# Patient Record
Sex: MALE | Race: WHITE | NOT HISPANIC OR LATINO | Employment: OTHER | URBAN - METROPOLITAN AREA
[De-identification: names, ages, dates, MRNs, and addresses within clinical notes are randomized per-mention and may not be internally consistent; named-entity substitution may affect disease eponyms.]

---

## 2017-05-22 DIAGNOSIS — M10.9 GOUT: ICD-10-CM

## 2017-05-22 DIAGNOSIS — E78.2 MIXED HYPERLIPIDEMIA: ICD-10-CM

## 2017-06-09 ENCOUNTER — ALLSCRIPTS OFFICE VISIT (OUTPATIENT)
Dept: OTHER | Facility: OTHER | Age: 62
End: 2017-06-09

## 2017-07-15 ENCOUNTER — GENERIC CONVERSION - ENCOUNTER (OUTPATIENT)
Dept: OTHER | Facility: OTHER | Age: 62
End: 2017-07-15

## 2017-11-09 DIAGNOSIS — M10.9 GOUT: ICD-10-CM

## 2017-11-09 DIAGNOSIS — I10 ESSENTIAL (PRIMARY) HYPERTENSION: ICD-10-CM

## 2017-11-09 DIAGNOSIS — E78.2 MIXED HYPERLIPIDEMIA: ICD-10-CM

## 2017-11-09 DIAGNOSIS — R73.9 HYPERGLYCEMIA: ICD-10-CM

## 2017-11-09 DIAGNOSIS — D68.59 OTHER PRIMARY THROMBOPHILIA (HCC): ICD-10-CM

## 2017-11-09 DIAGNOSIS — Z12.5 ENCOUNTER FOR SCREENING FOR MALIGNANT NEOPLASM OF PROSTATE: ICD-10-CM

## 2017-12-12 ENCOUNTER — ALLSCRIPTS OFFICE VISIT (OUTPATIENT)
Dept: OTHER | Facility: OTHER | Age: 62
End: 2017-12-12

## 2017-12-12 DIAGNOSIS — R73.9 HYPERGLYCEMIA: ICD-10-CM

## 2017-12-12 DIAGNOSIS — E78.2 MIXED HYPERLIPIDEMIA: ICD-10-CM

## 2017-12-12 DIAGNOSIS — M10.9 GOUT: ICD-10-CM

## 2017-12-12 DIAGNOSIS — D68.59 OTHER PRIMARY THROMBOPHILIA (HCC): ICD-10-CM

## 2017-12-13 NOTE — PROGRESS NOTES
Assessment    1  Essential hypertension (401 9) (I10)   2  Mixed hyperlipidemia (272 2) (E78 2)   3  Hypercoagulable state (289 81) (D68 59)   4  Chronic obstructive pulmonary disease (COPD) (496) (J44 9)   5  Other intervertebral disc degeneration of lumbar region (722 52) (M51 36)   6  Pneumonia (486) (J18 9)   7  Elevated blood sugar (790 29) (R73 9)   8  NAS (obstructive sleep apnea) (327 23) (G47 33)    Plan  Elevated blood sugar, Gout, Hypercoagulable state, Mixed hyperlipidemia    · (1) CBC/PLT/DIFF; Status:Active; Requested for:67Cya1162;    · (1) COMPREHENSIVE METABOLIC PANEL; Status:Active; Requested for:87Faq0557;    · (1) HEMOGLOBIN A1C; Status:Active; Requested for:87Hru1509;    · (1) LIPID PANEL, FASTING; Status:Active; Requested for:05Jic6928;    · (1) URIC ACID; Status:Active; Requested for:69Yuj7397; Health Maintenance    · Follow-up visit in 6 months Evaluation and Treatment  Follow-up  Status: Hold For -Scheduling  Requested for: 77Aei9356  Neuropathy    · Lyrica 150 MG Oral Capsule; TAKE 1 CAPSULE TWICE DAILY  Other intervertebral disc degeneration of lumbar region    · DiazePAM 5 MG Oral Tablet; take 1 tablet  q Hs   · OxyCODONE HCl - 5 MG Oral Tablet; 1 daily for pain    Discussion/Summary    Low carb diet and weight loss are recommended  His hemoglobin A1c is 6 8 however this may be artificially elevated due to recent hospitalization and use of steroids for his pneumonia  He will continue follow-up with pulmonology  The patient was counseled regarding diagnostic results,-- instructions for management  Possible side effects of new medications were reviewed with the patient/guardian today  The treatment plan was reviewed with the patient/guardian  The patient/guardian understands and agrees with the treatment plan      Chief Complaint  Patient is here today for six month follow up visit  No complaint of pain  Patient is requesting prescription refills for Lyrica, Oxycodone and Diazepam  Patient is here today for follow up of chronic conditions described in HPI  History of Present Illness  Patient comes in for checkup and hospital follow-up where he had pneumonia and respiratory failure  He is now on continuous oxygen  He also has been diagnosed with sleep apnea  The patient is being seen for a routine clinic follow-up of hyperlipidemia  Current treatment includes statins  By report, there is good compliance with treatment, good tolerance of treatment and good symptom control  There are no known treatment complications  The patient states he has been stable with his COPD control since the last visit  Symptoms:  Medications: the patient is adherent with his medication regimen  -- He denies medication side effects  Medication(s): short acting beta agonist agent,-- long acting beta agonist agent,-- inhaled steroids-- and-- leukotriene inhibitors  The patient presents for follow-up of essential hypertension  Symptoms:  Medications: the patient is adherent with his medication regimen  -- He denies medication side effects  Medication(s): a beta blocking agent  Review of Systems   Constitutional: No fever or chills, feels well, no tiredness, no recent weight gain or weight loss  Cardiovascular: No complaints of slow heart rate, no fast heart rate, no chest pain, no palpitations, no leg claudication, no lower extremity  Respiratory: No complaints of shortness of breath, no wheezing, no cough, no SOB on exertion, no orthopnea or PND  Gastrointestinal: No complaints of abdominal pain, no constipation, no nausea or vomiting, no diarrhea or bloody stools  Active Problems  1  Chronic obstructive pulmonary disease (COPD) (496) (J44 9)   2  Dental abscess (522 5) (K04 7)   3  DVT (deep venous thrombosis) (453 40) (I82 409)   4  ED (erectile dysfunction) (607 84) (N52 9)   5  Edema (782 3) (R60 9)   6  Elevated blood sugar (790 29) (R73 9)   7   Encounter for prostate cancer screening (V76 44) (Z12 5)   8  Encounter for screening colonoscopy (V76 51) (Z12 11)   9  Essential hypertension (401 9) (I10)   10  Gout (274 9) (M10 9)   11  Hypercoagulable state (289 81) (D68 59)   12  Mixed hyperlipidemia (272 2) (E78 2)   13  Need for diphtheria-tetanus-pertussis (Tdap) vaccine (V06 1) (Z23)   14  Neuropathy (355 9) (G62 9)   15  Other intervertebral disc degeneration of lumbar region (722 52) (M51 36)   16  Pulmonary embolism (415 19) (I26 99)   17  Renal insufficiency (593 9) (N28 9)   18  Ventral hernia (553 20) (K43 9)    Past Medical History  1  History of colonoscopy (V45 89) (Z98 890)   2  History of EKG (V15 89) (Z92 89)   3  History of Synovitis and tenosynovitis (727 00) (M65 9)    The active problems and past medical history were reviewed and updated today  Surgical History  1  History of Knee Surgery Right   2  History of Laminectomy Lumbar    Family History  Mother    1  Family history of Alzheimer disease (331 0) (G30 9)   2  Denied: Family history of substance abuse  Father    3  Denied: Family history of substance abuse   4  Family history of Lung cancer   5  Family history of Multiple sclerosis    Social History     · Always uses seat belt   ·    · Exercises regularly   · Former smoker (V15 82) (J56 930)   · No alcohol use   · Seeing a dentist    Current Meds   1  12 Hour Antihistamine 6-120 MG TBCR; Therapy: (Recorded:79Wbn5439) to Recorded   2  Allopurinol 100 MG Oral Tablet; take 1 tablet by mouth twice a day; Therapy: 40BGU6291 to (Evaluate:14Oct2018)  Requested for: 19Oct2017; Last Rx:19Oct2017 Ordered   3  Anoro Ellipta 62 5-25 MCG/INH Inhalation Aerosol Powder Breath Activated; INHALE 1 PUFF ONCE A DAY; Therapy: 69HAA7944 to (Bryan Neri)  Requested for: 18USZ0035; Last Rx:08Dyn9839 Ordered   4  Arnuity Ellipta 100 MCG/ACT Inhalation Aerosol Powder Breath Activated; INHALE 1 PUFF DAILY; Therapy: 11VEX6600 to (Last Rx:47Wsm8050) Ordered   5   Atorvastatin Calcium 40 MG Oral Tablet; TAKE 1 TABLET DAILY; Therapy: 81UCH3325 to Recorded   6  Daily Multivitamin TABS; TAKE 1 TABLET DAILY; Therapy: (Recorded:08Jun2015) to Recorded   7  DiazePAM 5 MG Oral Tablet; take 1 tablet  q Hs; Therapy: 14QRV6460 to (Last Rx:26Sep2017) Ordered   8  Fenofibrate Micronized 134 MG Oral Capsule; TAKE ONE CAPSULE BY MOUTH EVERY DAY; Therapy: 06TGU7209 to (Evaluate:20Aug2018)  Requested for: 46Xik2229; Last Rx:95Mzd9475 Ordered   9  Lyrica 150 MG Oral Capsule; TAKE 1 CAPSULE TWICE DAILY; Therapy: 99PGZ9595 to (Evaluate:25Mar2018); Last Rx:26Sep2017 Ordered   10  Metoprolol Succinate ER 50 MG Oral Tablet Extended Release 24 Hour; take 1 tablet  every day; Therapy: 98EDK1362 to (Evaluate:24Apr2018)  Requested for: 39UPO8276; Last  Rx:15Gmu1845 Ordered   11  OxyCODONE HCl - 5 MG Oral Tablet; 1 daily for pain; Therapy: 03XQU4816 to (Evaluate:02Oct2017); Last Rx:26Sep2017 Ordered   12  ProAir  (90 Base) MCG/ACT Inhalation Aerosol Solution; 2 puffs q 4 h prn sob; Therapy: 27ELH8252 to (Last Rx:09Jun2017) Ordered   13  ProAir  (90 Base) MCG/ACT Inhalation Aerosol Solution; inhale 2 puffs every 4  hours as needed; Therapy: 31UHI3177 to (Evaluate:17Oct2017)  Requested for: 56FLM4954; Last  Rx:22Ctb5677 Ordered   14  Tylenol 325 MG Oral Tablet; TAKE 1 TABLET 3 times daily; Therapy: (Recorded:08Jun2015) to Recorded   15  Xarelto 20 MG Oral Tablet; take 1 tablet by mouth every day; Therapy: 57MUL4676 to (Augustine Moody)  Requested for: 53EGF5133; Last  Rx:02Jun2017 Ordered    The medication list was reviewed and updated today  Allergies  1  Penicillins    Vitals  Vital Signs    Recorded: 12Dec2017 09:11AM   Heart Rate 63   Systolic 741   Diastolic 72   Height 6 ft    Weight 262 lb    BMI Calculated 35 53   BSA Calculated 2 39   O2 Saturation 93, Nasal Cannula   FiO2 2L/min, Nasal Cannula       Physical Exam   Constitutional  General appearance: Abnormal   overweight    Eyes Conjunctiva and lids: No swelling, erythema, or discharge  Pupils and irises: Equal, round and reactive to light  Ears, Nose, Mouth, and Throat  External inspection of ears and nose: Normal    Nasal mucosa, septum, and turbinates: Normal without edema or erythema  Oropharynx: Normal with no erythema, edema, exudate or lesions  Pulmonary  Respiratory effort: No increased work of breathing or signs of respiratory distress  Auscultation of lungs: Clear to auscultation, equal breath sounds bilaterally, no wheezes, no rales, no rhonci  Cardiovascular  Auscultation of heart: Normal rate and rhythm, normal S1 and S2, without murmurs  Examination of extremities for edema and/or varicosities: Normal    Carotid pulses: Normal    Abdomen  Abdomen: Non-tender, no masses  Liver and spleen: No hepatomegaly or splenomegaly  Lymphatic  Palpation of lymph nodes in neck: No lymphadenopathy  Musculoskeletal  Gait and station: Normal    Digits and nails: Normal without clubbing or cyanosis  Inspection/palpation of joints, bones, and muscles: Normal    Psychiatric  Orientation to person, place and time: Normal    Mood and affect: Normal          Health Management  Encounter for screening colonoscopy   COLONOSCOPY; every 3 years; Last 08Lwh5627; Next Due: 39Aja5940;  Active    Signatures   Electronically signed by : FLORY Garcia ; Dec 12 2017 10:04AM EST                       (Author)

## 2018-01-12 NOTE — PROGRESS NOTES
Assessment    1  Encounter for preventive health examination (V70 0) (Z00 00)   2  Essential hypertension (401 9) (I10)   3  Mixed hyperlipidemia (272 2) (E78 2)   4  Chronic obstructive pulmonary disease (COPD) (496) (J44 9)   5  Hypercoagulable state (289 81) (D68 59)   6  Renal insufficiency (593 9) (N28 9)   7  Neuropathy (355 9) (G62 9)   8  Edema (782 3) (R60 9)   9  Elevated blood sugar (790 29) (R73 9)    Plan  Chronic obstructive pulmonary disease (COPD)    · ProAir  (90 Base) MCG/ACT Inhalation Aerosol Solution; 2 puffs q 4 h prn  sob   · 3 - Chepe LO, Nithin Le  (Pulmonary Disease) Co-Management  *  Status: Active   Requested for: 48NSJ4944  are Referring to a non- Preferred Provider : Quality  Care Summary provided  : Yes  Elevated blood sugar, Encounter for prostate cancer screening, Essential hypertension,  Gout, Hypercoagulable state, Mixed hyperlipidemia    · (1) CBC/PLT/DIFF; Status:Active; Requested for:09Nov2017;    · (1) COMPREHENSIVE METABOLIC PANEL; Status:Active; Requested for:09Nov2017;    · (1) HEMOGLOBIN A1C; Status:Active; Requested QBK:56CKN5504;    · (1) LIPID PANEL, FASTING; Status:Active; Requested for:09Nov2017;    · (1) PSA (SCREEN) (Dx V76 44 Screen for Prostate Cancer); Status:Active; Requested  for:09Nov2017;    · (1) URIC ACID; Status:Active; Requested SRN:64CKE0146;   Encounter for screening colonoscopy    · COLONOSCOPY; Status:Complete - Retrospective By Protocol Authorization;   Done:  94JND9791 12:00AM   · COLONOSCOPY ; every 3 years; Last 18PCN6635; Next 26Feb2019; Status:Active  Gout    · From  Allopurinol 100 MG Oral Tablet take 1 tablet by mouth twice a day To  Allopurinol 100 MG Oral Tablet Take 1 tablet by mouth daily  Health Maintenance    · Follow-up visit in 6 months Evaluation and Treatment  Follow-up  Status: Complete   Done: 69KUB2706    Discussion/Summary  Impression: health maintenance visit   Prostate cancer screening: prostate cancer screening is current  Testicular cancer screening: the risks and benefits of testicular cancer screening were discussed  Colorectal cancer screening: colorectal cancer screening is current  Chief Complaint  Pt  in office today for a check up  Pt  would also like to discuss Amuity inhaler  History of Present Illness  HM, Adult Male: The patient is being seen for a health maintenance evaluation  Social History: Work status: working full time  The patient is a former cigarette smoker  He reports never drinking alcohol  General Health: The patient's health since the last visit is described as fair  He has regular dental visits  He denies vision problems  He has hearing loss  Immunizations status: up to date  Lifestyle:  He has weight concerns  He exercises regularly  He does not use tobacco  He denies alcohol use  He denies drug use  Screening: cancer screening reviewed and current  metabolic screening reviewed and current  risk screening reviewed and current  HPI: Patient comes in for a checkup  He is concerned regarding increasing shortness of breath despite inhaler changes by his pulmonologist  He wished to see another pulmonologist      Review of Systems    Constitutional: No fever or chills, feels well, no tiredness, no recent weight gain or weight loss  ENT: no complaints of earache, no hearing loss, no nosebleeds, no nasal discharge, no sore throat, no hoarseness  Cardiovascular: No complaints of slow heart rate, no fast heart rate, no chest pain, no palpitations, no leg claudication, no lower extremity  Respiratory: shortness of breath during exertion, but no orthopnea and no PND  Gastrointestinal: No complaints of abdominal pain, no constipation, no nausea or vomiting, no diarrhea or bloody stools  Active Problems    1  Chronic obstructive pulmonary disease (COPD) (496) (J44 9)   2  Dental abscess (522 5) (K04 7)   3  DVT (deep venous thrombosis) (453 40) (I82 409)   4   ED (erectile dysfunction) (607 84) (N52 9)   5  Edema (782 3) (R60 9)   6  Encounter for prostate cancer screening (V76 44) (Z12 5)   7  Encounter for screening colonoscopy (V76 51) (Z12 11)   8  Essential hypertension (401 9) (I10)   9  Gout (274 9) (M10 9)   10  Hypercoagulable state (289 81) (D68 59)   11  Mixed hyperlipidemia (272 2) (E78 2)   12  Need for diphtheria-tetanus-pertussis (Tdap) vaccine (V06 1) (Z23)   13  Neuropathy (355 9) (G62 9)   14  Other intervertebral disc degeneration of lumbar region (722 52) (M51 36)   15  Pulmonary embolism (415 19) (I26 99)   16  Renal insufficiency (593 9) (N28 9)   17  Ventral hernia (553 20) (K43 9)    Past Medical History    · History of colonoscopy (V45 89) (Z98 890)   · History of EKG (V15 89) (Z92 89)   · History of Synovitis and tenosynovitis (727 00) (M65 9)    Surgical History    · History of Knee Surgery Right   · History of Laminectomy Lumbar    Family History  Mother    · Family history of Alzheimer disease (331 0) (G30 9)   · Denied: Family history of substance abuse  Father    · Denied: Family history of substance abuse   · Family history of Lung cancer   · Family history of Multiple sclerosis    Social History    · Always uses seat belt   ·    · Exercises regularly   · 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[employer]   · Former smoker (V15 82) (V72 185)   · No alcohol use   · Seeing a dentist    Current Meds   1  Allopurinol 100 MG Oral Tablet; take 1 tablet by mouth twice a day; Therapy: 90MEC6475 to (Evaluate:62Kgi8086)  Requested for: 21Siu2313; Last   Rx:27Qjf7473 Ordered   2  Anoro Ellipta 62 5-25 MCG/INH Inhalation Aerosol Powder Breath Activated; INHALE 1   PUFF ONCE A DAY; Therapy: 51TIF3366 to (Evaluate:17Bjx1014)  Requested for: 48NBQ9122; Last   Rx:69Vco5775 Ordered   3  Arnuity Ellipta 100 MCG/ACT Inhalation Aerosol Powder Breath Activated; INHALE 1   PUFF DAILY; Therapy: 43FYU8948 to (Last Rx:09Hrl4683) Ordered   4   Atorvastatin Calcium 40 MG Oral Tablet; TAKE 1 TABLET DAILY; Therapy: 77HDG9458 to Recorded   5  Daily Multivitamin TABS; TAKE 1 TABLET DAILY; Therapy: (Recorded:08Jun2015) to Recorded   6  DiazePAM 5 MG Oral Tablet; take 1 tablet  q Hs; Therapy: 53XOY3657 to (Last Rx:22May2017) Ordered   7  Fenofibrate Micronized 134 MG Oral Capsule; take 1 capsule daily; Therapy: 90CGQ3615 to (Evaluate:24Hqw6998)  Requested for: 15KLG4194; Last   Rx:09Jun2016 Ordered   8  Lyrica 150 MG Oral Capsule; TAKE 1 CAPSULE TWICE DAILY; Therapy: 74LSU8051 to (Evaluate:28May2017); Last Rx:22May2017 Ordered   9  Metoprolol Succinate ER 50 MG Oral Tablet Extended Release 24 Hour; take 1 tablet   every day; Therapy: 73HCA6993 to (Evaluate:79Fsj9728)  Requested for: 02AKR2532; Last   Rx:07Nov2016 Ordered   10  OxyCODONE HCl - 5 MG Oral Tablet; 1 daily for pain; Therapy: 04WGH7924 to (Evaluate:28May2017); Last Rx:22May2017 Ordered   11  Tylenol 325 MG Oral Tablet; TAKE 1 TABLET 3 times daily; Therapy: (Recorded:08Jun2015) to Recorded   12  Xarelto 20 MG Oral Tablet; take 1 tablet by mouth every day; Therapy: 34PUE0832 to (Cristiano Saravia)  Requested for: 47EJA2708; Last    Rx:02Jun2017 Ordered    Allergies    1  Penicillins    Vitals   Recorded: 09Jun2017 09:59AM Recorded: 21LEE0159 09:26AM   Heart Rate  74   Systolic 153 699   Diastolic 82 90   Height  6 ft    Weight  260 lb    BMI Calculated  35 26   BSA Calculated  2 38   O2 Saturation  89     Physical Exam    Constitutional   General appearance: No acute distress, well appearing and well nourished  Eyes   Conjunctiva and lids: No swelling, erythema, or discharge  Pupils and irises: Equal, round and reactive to light  Ears, Nose, Mouth, and Throat   External inspection of ears and nose: Normal     Otoscopic examination: Tympanic membrance translucent with normal light reflex  Canals patent without erythema  Oropharynx: Normal with no erythema, edema, exudate or lesions      Pulmonary   Respiratory effort: No increased work of breathing or signs of respiratory distress  Auscultation of lungs: Clear to auscultation  Cardiovascular   Auscultation of heart: Normal rate and rhythm, normal S1 and S2, without murmurs  Examination of extremities for edema and/or varicosities: Abnormal   bilateral pretibial 1+ pitting edema  Abdomen   Abdomen: Non-tender, no masses  Liver and spleen: No hepatomegaly or splenomegaly  Lymphatic   Palpation of lymph nodes in neck: No lymphadenopathy  Musculoskeletal   Gait and station: Normal     Digits and nails: Normal without clubbing or cyanosis  Inspection/palpation of joints, bones, and muscles: Normal     Skin   Skin and subcutaneous tissue: Normal without rashes or lesions  Neurologic   Cranial nerves: Cranial nerves 2-12 intact  Reflexes: 2+ and symmetric  Sensation: Abnormal   Numbness of legs  Psychiatric   Orientation to person, place and time: Normal     Mood and affect: Normal        Results/Data  COLONOSCOPY 26Feb2016 12:00AM Ruddy Leos     Test Name Result Flag Reference   Colonoscopy 02/26/2016       Summary / No summary entered :      No summary entered  Documents attached :      sColonoscopies - Ruddy Leos; Enc: [id number] - Image Encounter - Ruddy Leos -      [facility]) (Result Document)    Health Management  Encounter for screening colonoscopy   COLONOSCOPY; every 3 years; Last 26Feb2016; Next Due: 68Xkx7873; Active    Future Appointments    Date/Time Provider Specialty Site   12/12/2017 09:15 AM FLORY Kovacs   [address]     Signatures   Electronically signed by : FLORY Rodriguez ; Jun 9 2017  1:08PM EST                       (Author)

## 2018-01-13 VITALS
DIASTOLIC BLOOD PRESSURE: 82 MMHG | HEART RATE: 74 BPM | OXYGEN SATURATION: 89 % | WEIGHT: 260 LBS | SYSTOLIC BLOOD PRESSURE: 134 MMHG | BODY MASS INDEX: 35.21 KG/M2 | HEIGHT: 72 IN

## 2018-01-23 VITALS
DIASTOLIC BLOOD PRESSURE: 72 MMHG | WEIGHT: 262 LBS | OXYGEN SATURATION: 93 % | SYSTOLIC BLOOD PRESSURE: 124 MMHG | HEIGHT: 72 IN | BODY MASS INDEX: 35.49 KG/M2 | HEART RATE: 63 BPM

## 2018-01-23 NOTE — MISCELLANEOUS
Assessment   1  Pulmonary embolism (415 19) (I26 99)1   2  DVT (deep venous thrombosis) (453 40) (I82 409)1   3  COPD (chronic obstructive pulmonary disease) (496) (J44 9)1   4  Essential hypertension (401 9) (I10)1   5  Mixed hyperlipidemia (272 2) (E78 2)1   6  Renal insufficiency (593 9) (N28 9)1   7  Edema (782 3) (R60 9)2   8  Dental abscess (522 5) (K04 7)3      1 Amended By: Shan Jones; Apr 26 2016 6:35 PM EST   2 Amended By: Shan Jones; Apr 26 2016 6:43 PM EST   3 Amended By: Shan Jones; Apr 26 2016 7:04 PM EST    Discussion/Summary  Discussion Summary: We are getting a basic metabolic panel and uric acid level  We will call regarding results  He is to follow-up with hematology  1        1 Amended By: Shan Jones; Apr 26 2016 7:08 PM EST    Chief Complaint  Chief Complaint Free Text Note Form: 1,2  MILLICENT  Pt  is now prescribed Xarelto 15mg  This will be increasing to 20mg in a couple days  He has questions regarding his furosemide  NSR2        1 Amended By: Roberto Herr; Apr 26 2016 1:34 PM EST   2 Amended By: Roberto Herr; Apr 26 2016 6:16 PM EST    History of Present Illness  TCM Communication [facility]: The patient is being contacted for  follow-up after hospitalization1  1   Hospital  course was discussed with the inpatient physician1  and  records were reviewed1  1   He was hospitalized at [facility]  The date of admission: 4/17/2016, date of discharge: 4/19/2016  Diagnosis:1  Bilateral PE and bilateral DVT1   He was discharged to home  Medications reviewed and updated today  1    He scheduled a follow up appointment  The patient is currently asymptomatic  Communication performed and completed by [facility], [address]       1 Amended By: Shan Jones; Apr 26 2016 7:06 PM EST    Review of Systems  Complete-Male:   Constitutional:1  No fever or chills, feels well, no tiredness, no recent weight gain or weight loss1      Cardiovascular: 1  No complaints of slow heart rate, no fast heart rate, no chest pain, no palpitations, no leg claudication, no lower extremity1   Respiratory:1  No complaints of shortness of breath, no wheezing, no cough, no SOB on exertion, no orthopnea or PND1   1 Amended By: Marc Greer; Apr 26 2016 7:07 PM EST    Active Problems   1  COPD (chronic obstructive pulmonary disease) (496) (J44 9)  2  DVT (deep venous thrombosis) (453 40) (I82 409)  3  ED (erectile dysfunction) (607 84) (N52 9)  4  Encounter for screening colonoscopy (V76 51) (Z12 11)  5  Essential hypertension (401 9) (I10)  6  Gout (274 9) (M10 9)  7  Mixed hyperlipidemia (272 2) (E78 2)  8  Neuropathy (355 9) (G62 9)  9  Other intervertebral disc degeneration of lumbar region (722 52) (M51 36)  10  Renal insufficiency (593 9) (N28 9)  11  Ventral hernia (553 20) (K43 9)    Past Medical History   1  History of Edema (782 3) (R60 9)  2  History of colonoscopy (V45 89) (Z98 89)  3  History of EKG (V15 89) (Z92 89)  4  History of Synovitis and tenosynovitis (727 00) (M65 9)    Surgical History   1  History of Knee Surgery Right  2  History of Laminectomy Lumbar    Family History   1  Family history of Lung cancer  2  Family history of Multiple sclerosis    Social History    ·    · Exercises regularly   · Former smoker (W20 08) (B23 390)   · No alcohol use    Current Meds  1  1   2  Allopurinol 100 MG Oral Tablet; TAKE 1 TABLET BY MOUTH TWICE DAILY; Therapy: 38WEY3638 to (Evaluate:28Xdx6753)  Requested for: 54PPK6780; Last   Rx:22Jan2016 Ordered  3  Atorvastatin Calcium 40 MG Oral Tablet; TAKE 1 TABLET DAILY; Therapy: 48DWK4386 to Recorded  4  Breo Ellipta 100-25 MCG/INH Inhalation Aerosol Powder Breath Activated; USE 1   INHALATION ONCE DAILY Recorded  5  Cialis 20 MG Oral Tablet; TAKE AS DIRECTED; Therapy: 78BFB1845 to (Last Rx:08Jun2015) Ordered  6  Clindamycin HCl - 150 MG Oral Capsule; TAKE 1 CAPSULE EVERY 6 HOURS DAILY;    Therapy: 83MMI1183 to (Complete:3 3,4 83SDT9535)  Requested for: 16RHS9117;6  Last Rx:66Owt9180 Ordered3   7  Daily Multivitamin TABS; TAKE 1 TABLET DAILY; Therapy: (Recorded:08Jun2015) to Recorded  8  Diazepam 5 MG Oral Tablet; TAKE 1 TABLET  AS NEEDED; Therapy: 93ONG4529 to Recorded  9  1   10  Fenofibrate Micronized 134 MG Oral Capsule; TAKE ONE CAPSULE EVERY DAY; Therapy: 78GOP1153 to (Kolton Reeves)  Requested for: 61HNI2099; Last    Rx:03Jan2016 Ordered  11  Furosemide 20 MG Oral Tablet; take 1 tablet by mouth daily; Therapy: 09GQJ0501 to (Evaluate:21Llk8917)  Requested for: 88VFN7129; Last    Rx:13Svh5841 Ordered  12  Klor-Con M10 10 MEQ Oral Tablet Extended Release; take 2 tablets by mouth daily; Therapy: 11OLN1360 to (Evaluate:34Eku7265)  Requested for: 33Cit0643; Last    Rx:62Xnu5593 Ordered  13  Lyrica 150 MG Oral Capsule; TAKE 1 CAPSULE DAILY; Therapy: 64SCG5142 to Recorded  14  Metoprolol Succinate ER 50 MG Oral Tablet Extended Release 24 Hour; take 1 tablet    every day; Therapy: 90ZOE2959 to (Evaluate:95Rff0190)  Requested for: 89Vna2747; Last    Rx:89Bmv5686 Ordered  15  OxyCODONE HCl - 5 MG Oral Tablet; Therapy: 70FDQ2700 to (Evaluate:14Jun2015) Recorded  16  Perforomist 20 MCG/2ML Inhalation Nebulization Solution; Therapy: 06KPU1629 to (Evaluate:17Sjn4908) Recorded  17  ProAir  (90 Base) MCG/ACT Inhalation Aerosol Solution; INHALE 2 PUFFS    EVERY 4 HOURS AS NEEDED; Therapy: 92HBJ7099 to (Last [facility])  Requested for:2  93UQC9260 Ordered3   18  Tylenol 325 MG Oral Tablet; TAKE 1 TABLET 3 times daily; Therapy: (Recorded:08Jun2015) to Recorded  19  Xarelto 20 MG Oral Tablet; Take 1 tablet daily; Therapy: 83DUI4811 to (Last Rx:26Apr2016) Ordered1   Medication List Reviewed: The medication list was reviewed and updated today  4        1 Amended By: Marc Greer; Apr 26 2016 6:36 PM EST   2 Amended By: Marc Greer; Apr 26 2016 7:02 PM EST   3 Amended By: Marc Greer;  Apr 26 2016 7:02 PM EST   4 Amended By: Clover Reyna, Bari; Apr 26 2016 7:07 PM EST    Allergies   1  Penicillins    Vitals  Signs [Data Includes: Current Encounter]   Recorded: 55UCX0031 06:09PM    Heart Rate: 70 1    Systolic: 349 1    Diastolic: 68 1    Height: 6 ft  1    Weight: 256 lb  1    BMI Calculated: 34 72 1    BSA Calculated: 2 37 1    O2 Saturation: 93 1      1 Amended By: Maurizio Veloz; Apr 26 2016 6:36 PM EST    Physical Exam    Constitutional1    General appearance: No acute distress, well appearing and well nourished  1    Eyes1    Conjunctiva and lids: No swelling, erythema, or discharge  1    Pupils and irises: Equal, round and reactive to light  1    Ears, Nose, Mouth, and Throat1    External inspection of ears and nose: Normal 1                1,2             1,2    Oropharynx: Normal with no erythema, edema, exudate or lesions  1    Pulmonary1    Respiratory effort: No increased work of breathing or signs of respiratory distress  1    Auscultation of lungs: Clear to auscultation, equal breath sounds bilaterally, no wheezes, no rales, no rhonci  1    Cardiovascular1          1,2    Auscultation of heart: Normal rate and rhythm, normal S1 and S2, without murmurs  1           1,2    Examination of extremities for edema and/or varicosities: Abnormal  2  Bilateral pretibial 1+ pitting edema2   Carotid pulses: Normal 1    Abdomen1    Abdomen: Non-tender, no masses  1    Liver and spleen: No hepatomegaly or splenomegaly  1    Lymphatic1    Palpation of lymph nodes in neck: No lymphadenopathy  1    Musculoskeletal1    Gait and station: Normal 1    Digits and nails: Normal without clubbing or cyanosis  1    Inspection/palpation of joints, bones, and muscles: Normal 1    Skin1    Skin and subcutaneous tissue: Normal without rashes or lesions  1    Neurologic1    Cranial nerves: Cranial nerves 2-12 intact  1    Reflexes: 2+ and symmetric  1    Sensation: No sensory loss  1    Psychiatric1    Orientation to person, place and time: Normal 1    Mood and affect: Normal 1 1 Amended By: Roma Nelson; Apr 26 2016 6:36 PM EST   2 Amended By: Roma Nelson; Apr 26 2016 7:08 PM EST    Health Management  Encounter for screening colonoscopy   COLONOSCOPY; every 3 years; Last 42Owg7707; Next Due: 50Yfs6388; Overdue    Future Appointments    Date/Time Provider Specialty Site   04/26/2016 06:15 PM FLORY Elizabeth  [address]   06/09/2016 10:45 AM FLORY Elizabeth   [address]     Signatures   Electronically signed by : FLORY Ahmadi ; Apr 20 2016 12:27PM EST                          Electronically signed by : FLORY Ahmadi ; Apr 26 2016  3:09PM EST                          Electronically signed by : FLORY Ahmadi ; Apr 26 2016  7:08PM EST                       (Author)

## 2018-04-09 DIAGNOSIS — G89.4 CHRONIC PAIN SYNDROME: ICD-10-CM

## 2018-04-09 DIAGNOSIS — F41.9 ANXIETY: Primary | ICD-10-CM

## 2018-04-09 RX ORDER — OXYCODONE HYDROCHLORIDE 5 MG/1
TABLET ORAL
COMMUNITY
Start: 2015-03-31 | End: 2018-04-09 | Stop reason: SDUPTHER

## 2018-04-09 RX ORDER — PREGABALIN 150 MG/1
150 CAPSULE ORAL 2 TIMES DAILY
Qty: 60 CAPSULE | Refills: 0 | Status: SHIPPED | OUTPATIENT
Start: 2018-04-09 | End: 2018-07-16 | Stop reason: SDUPTHER

## 2018-04-09 RX ORDER — DIAZEPAM 5 MG/1
TABLET ORAL
COMMUNITY
Start: 2014-11-10 | End: 2018-04-09 | Stop reason: SDUPTHER

## 2018-04-09 RX ORDER — OXYCODONE HYDROCHLORIDE 5 MG/1
5 TABLET ORAL EVERY 6 HOURS PRN
Qty: 30 TABLET | Refills: 0 | Status: SHIPPED | OUTPATIENT
Start: 2018-04-09 | End: 2018-04-10 | Stop reason: SDUPTHER

## 2018-04-09 RX ORDER — PREGABALIN 150 MG/1
1 CAPSULE ORAL 2 TIMES DAILY
COMMUNITY
Start: 2014-11-10 | End: 2018-04-09 | Stop reason: SDUPTHER

## 2018-04-09 RX ORDER — DIAZEPAM 5 MG/1
5 TABLET ORAL EVERY 6 HOURS PRN
Qty: 30 TABLET | Refills: 0 | Status: SHIPPED | OUTPATIENT
Start: 2018-04-09 | End: 2018-07-16 | Stop reason: SDUPTHER

## 2018-04-10 DIAGNOSIS — G89.4 CHRONIC PAIN SYNDROME: ICD-10-CM

## 2018-04-10 RX ORDER — OXYCODONE HYDROCHLORIDE 5 MG/1
5 TABLET ORAL EVERY 6 HOURS PRN
Qty: 90 TABLET | Refills: 0 | Status: SHIPPED | OUTPATIENT
Start: 2018-04-10 | End: 2018-04-24 | Stop reason: SDUPTHER

## 2018-04-18 DIAGNOSIS — I10 ESSENTIAL HYPERTENSION: Primary | ICD-10-CM

## 2018-04-18 RX ORDER — METOPROLOL SUCCINATE 50 MG/1
TABLET, EXTENDED RELEASE ORAL
Qty: 90 TABLET | Refills: 2 | Status: SHIPPED | OUTPATIENT
Start: 2018-04-18 | End: 2019-01-07 | Stop reason: SDUPTHER

## 2018-04-24 ENCOUNTER — OFFICE VISIT (OUTPATIENT)
Dept: FAMILY MEDICINE CLINIC | Facility: CLINIC | Age: 63
End: 2018-04-24
Payer: COMMERCIAL

## 2018-04-24 VITALS
BODY MASS INDEX: 35.89 KG/M2 | WEIGHT: 265 LBS | HEIGHT: 72 IN | HEART RATE: 96 BPM | OXYGEN SATURATION: 90 % | TEMPERATURE: 98.5 F | SYSTOLIC BLOOD PRESSURE: 124 MMHG | DIASTOLIC BLOOD PRESSURE: 70 MMHG

## 2018-04-24 DIAGNOSIS — G89.4 CHRONIC PAIN SYNDROME: ICD-10-CM

## 2018-04-24 DIAGNOSIS — G47.33 OSA (OBSTRUCTIVE SLEEP APNEA): ICD-10-CM

## 2018-04-24 DIAGNOSIS — N28.9 RENAL INSUFFICIENCY: ICD-10-CM

## 2018-04-24 DIAGNOSIS — D68.59 HYPERCOAGULABLE STATE (HCC): ICD-10-CM

## 2018-04-24 DIAGNOSIS — M51.36 DEGENERATION OF INTERVERTEBRAL DISC OF LUMBAR REGION: ICD-10-CM

## 2018-04-24 DIAGNOSIS — I10 ESSENTIAL HYPERTENSION: ICD-10-CM

## 2018-04-24 DIAGNOSIS — E78.5 HYPERLIPIDEMIA, UNSPECIFIED HYPERLIPIDEMIA TYPE: Primary | ICD-10-CM

## 2018-04-24 DIAGNOSIS — J44.9 CHRONIC OBSTRUCTIVE PULMONARY DISEASE, UNSPECIFIED COPD TYPE (HCC): ICD-10-CM

## 2018-04-24 PROBLEM — R73.9 ELEVATED BLOOD SUGAR: Status: ACTIVE | Noted: 2017-06-09

## 2018-04-24 PROCEDURE — 99213 OFFICE O/P EST LOW 20 MIN: CPT | Performed by: FAMILY MEDICINE

## 2018-04-24 PROCEDURE — 3078F DIAST BP <80 MM HG: CPT | Performed by: FAMILY MEDICINE

## 2018-04-24 PROCEDURE — 3074F SYST BP LT 130 MM HG: CPT | Performed by: FAMILY MEDICINE

## 2018-04-24 PROCEDURE — 3008F BODY MASS INDEX DOCD: CPT | Performed by: FAMILY MEDICINE

## 2018-04-24 RX ORDER — ALBUTEROL SULFATE 90 UG/1
2 AEROSOL, METERED RESPIRATORY (INHALATION) EVERY 4 HOURS PRN
COMMUNITY
Start: 2017-06-09

## 2018-04-24 RX ORDER — ERGOCALCIFEROL (VITAMIN D2) 10 MCG
1 TABLET ORAL DAILY
COMMUNITY

## 2018-04-24 RX ORDER — ATORVASTATIN CALCIUM 40 MG/1
40 TABLET, FILM COATED ORAL
COMMUNITY
Start: 2013-09-18 | End: 2018-04-24 | Stop reason: SDUPTHER

## 2018-04-24 RX ORDER — ATORVASTATIN CALCIUM 40 MG/1
1 TABLET, FILM COATED ORAL DAILY
COMMUNITY
Start: 2014-11-10 | End: 2018-04-24

## 2018-04-24 RX ORDER — OXYCODONE HYDROCHLORIDE 5 MG/1
5 TABLET ORAL EVERY 6 HOURS PRN
Qty: 90 TABLET | Refills: 0 | Status: SHIPPED | OUTPATIENT
Start: 2018-04-24 | End: 2018-07-16 | Stop reason: SDUPTHER

## 2018-04-24 RX ORDER — FENOFIBRATE 134 MG/1
1 CAPSULE ORAL DAILY
COMMUNITY
Start: 2015-06-11 | End: 2018-08-14 | Stop reason: SDUPTHER

## 2018-04-24 RX ORDER — UMECLIDINIUM BROMIDE AND VILANTEROL TRIFENATATE 62.5; 25 UG/1; UG/1
POWDER RESPIRATORY (INHALATION)
COMMUNITY
Start: 2018-04-01 | End: 2021-07-12

## 2018-04-24 RX ORDER — ALLOPURINOL 100 MG/1
1 TABLET ORAL DAILY
COMMUNITY
Start: 2014-11-10 | End: 2018-08-21 | Stop reason: SDUPTHER

## 2018-04-24 RX ORDER — ATORVASTATIN CALCIUM 40 MG/1
40 TABLET, FILM COATED ORAL DAILY
Qty: 90 TABLET | Refills: 3 | Status: SHIPPED | OUTPATIENT
Start: 2018-04-24 | End: 2019-04-08 | Stop reason: SDUPTHER

## 2018-04-24 NOTE — PROGRESS NOTES
Assessment/Plan:           Problem List Items Addressed This Visit     Chronic obstructive pulmonary disease (COPD) ([facility] )    Relevant Medications    Umeclidinium-Vilanterol 62 5-25 MCG/INH AEPB    Essential hypertension    Hypercoagulable state ([facility] )    Hyperlipidemia - Primary    Relevant Medications    atorvastatin (LIPITOR) 40 mg tablet    NAS (obstructive sleep apnea)    Degeneration of intervertebral disc of lumbar region    Renal insufficiency      Other Visit Diagnoses     Chronic pain syndrome        Relevant Medications    oxyCODONE (ROXICODONE) 5 mg immediate release tablet            Subjective:      Patient ID: Amador Mejia is a 58 y o  male  Patient comes in for checkup  The following portions of the patient's history were reviewed and updated as appropriate: allergies, current medications, past family history, past medical history, past social history, past surgical history and problem list     Review of Systems   Constitutional: Negative  HENT: Negative  Respiratory: Positive for shortness of breath  Cardiovascular: Negative  Objective:      /70   Pulse 96   Temp 98 5 °F (36 9 °C)   Ht 6' (1 829 m)   Wt 120 kg (265 lb)   SpO2 90%   BMI 35 94 kg/m²          Physical Exam   Constitutional: He is oriented to person, place, and time  He appears well-developed and well-nourished  Wearing nasal O2   HENT:   Head: Normocephalic and atraumatic  Right Ear: Tympanic membrane normal    Left Ear: Tympanic membrane normal    Eyes: EOM are normal  Pupils are equal, round, and reactive to light  Neck: Neck supple  Cardiovascular: Normal rate, regular rhythm and normal heart sounds  Pulmonary/Chest: Effort normal and breath sounds normal    Abdominal: Soft  Bowel sounds are normal    Musculoskeletal: Normal range of motion  Neurological: He is alert and oriented to person, place, and time  Skin: Skin is warm and dry     Psychiatric: He has a normal mood and affect  Thought content normal    Nursing note and vitals reviewed

## 2018-05-19 DIAGNOSIS — O22.30 DVT (DEEP VEIN THROMBOSIS) IN PREGNANCY: Primary | ICD-10-CM

## 2018-05-20 RX ORDER — RIVAROXABAN 20 MG/1
TABLET, FILM COATED ORAL
Qty: 90 TABLET | Refills: 3 | Status: SHIPPED | OUTPATIENT
Start: 2018-05-20 | End: 2019-05-03 | Stop reason: SDUPTHER

## 2018-05-26 DIAGNOSIS — R73.9 HYPERGLYCEMIA: ICD-10-CM

## 2018-05-26 DIAGNOSIS — M10.9 GOUT: ICD-10-CM

## 2018-05-26 DIAGNOSIS — D68.59 OTHER PRIMARY THROMBOPHILIA (HCC): ICD-10-CM

## 2018-05-26 DIAGNOSIS — E78.2 MIXED HYPERLIPIDEMIA: ICD-10-CM

## 2018-06-06 LAB — HBA1C MFR BLD HPLC: 6.8 %

## 2018-06-12 ENCOUNTER — OFFICE VISIT (OUTPATIENT)
Dept: FAMILY MEDICINE CLINIC | Facility: CLINIC | Age: 63
End: 2018-06-12
Payer: COMMERCIAL

## 2018-06-12 VITALS
WEIGHT: 268 LBS | BODY MASS INDEX: 36.3 KG/M2 | HEIGHT: 72 IN | SYSTOLIC BLOOD PRESSURE: 130 MMHG | HEART RATE: 90 BPM | TEMPERATURE: 98.1 F | OXYGEN SATURATION: 95 % | DIASTOLIC BLOOD PRESSURE: 72 MMHG

## 2018-06-12 DIAGNOSIS — I10 ESSENTIAL HYPERTENSION: ICD-10-CM

## 2018-06-12 DIAGNOSIS — G47.33 OSA (OBSTRUCTIVE SLEEP APNEA): ICD-10-CM

## 2018-06-12 DIAGNOSIS — E78.5 HYPERLIPIDEMIA, UNSPECIFIED HYPERLIPIDEMIA TYPE: Primary | ICD-10-CM

## 2018-06-12 DIAGNOSIS — J44.9 CHRONIC OBSTRUCTIVE PULMONARY DISEASE, UNSPECIFIED COPD TYPE (HCC): ICD-10-CM

## 2018-06-12 DIAGNOSIS — E11.9 TYPE 2 DIABETES MELLITUS WITHOUT COMPLICATION, WITHOUT LONG-TERM CURRENT USE OF INSULIN (HCC): ICD-10-CM

## 2018-06-12 DIAGNOSIS — M51.36 DEGENERATION OF INTERVERTEBRAL DISC OF LUMBAR REGION: ICD-10-CM

## 2018-06-12 DIAGNOSIS — Z12.5 PROSTATE CANCER SCREENING: ICD-10-CM

## 2018-06-12 DIAGNOSIS — D68.59 HYPERCOAGULABLE STATE (HCC): ICD-10-CM

## 2018-06-12 DIAGNOSIS — N28.9 RENAL INSUFFICIENCY: ICD-10-CM

## 2018-06-12 DIAGNOSIS — R73.9 ELEVATED BLOOD SUGAR: ICD-10-CM

## 2018-06-12 PROCEDURE — 1036F TOBACCO NON-USER: CPT | Performed by: FAMILY MEDICINE

## 2018-06-12 PROCEDURE — 99214 OFFICE O/P EST MOD 30 MIN: CPT | Performed by: FAMILY MEDICINE

## 2018-06-12 NOTE — PROGRESS NOTES
Assessment/Plan:      Return visit in 6 months with fasting blood work prior to visit     Problem List Items Addressed This Visit     Chronic obstructive pulmonary disease (COPD) ([facility] )     Follow-up with pulmonology         RESOLVED: Elevated blood sugar    Essential hypertension    Hypercoagulable state ([facility] 75 )     Continue Xarelto         Hyperlipidemia - Primary     Continue Lipitor 40 mg daily         Relevant Orders    Comprehensive metabolic panel    Lipid panel    NAS (obstructive sleep apnea)    Degeneration of intervertebral disc of lumbar region    RESOLVED: Renal insufficiency    Type 2 diabetes mellitus without complication, without long-term current use of insulin ([facility] 75 )     Patient is a new onset diabetic  Diabetic education classes are offered  Low carb diet was reviewed  Regular aerobic exercise recommended                 Relevant Orders    Hemoglobin A1C    Microalbumin / creatinine urine ratio    Urinalysis with reflex to microscopic      Other Visit Diagnoses     Prostate cancer screening        Relevant Orders    PSA, Total Screen            Subjective:      Patient ID: Linda Hollis is a 58 y o  male  Patient comes in for a checkup  He is now on continuous oxygen for COPD  The following portions of the patient's history were reviewed and updated as appropriate: allergies, current medications, past family history, past medical history, past social history, past surgical history and problem list     Review of Systems   Constitutional: Negative  HENT: Negative  Respiratory: Positive for shortness of breath  Cardiovascular: Negative  Gastrointestinal: Negative  Objective:      /72   Pulse 90   Temp 98 1 °F (36 7 °C)   Ht 6' (1 829 m)   Wt 122 kg (268 lb)   SpO2 95%   BMI 36 35 kg/m²          Physical Exam   Constitutional: He is oriented to person, place, and time  He appears well-developed and well-nourished  HENT:   Head: Normocephalic and atraumatic  Right Ear: Tympanic membrane and external ear normal    Left Ear: Tympanic membrane and external ear normal    Eyes: EOM are normal  Pupils are equal, round, and reactive to light  Neck: Neck supple  Cardiovascular: Normal rate, regular rhythm and normal heart sounds  Pulses:       Dorsalis pedis pulses are 1+ on the right side, and 1+ on the left side  Posterior tibial pulses are 1+ on the right side, and 1+ on the left side  Pulmonary/Chest: Effort normal and breath sounds normal    Abdominal: Soft  Bowel sounds are normal    Musculoskeletal: Normal range of motion  Feet:   Right Foot:   Skin Integrity: Negative for ulcer, skin breakdown, erythema, warmth, callus or dry skin  Left Foot:   Skin Integrity: Negative for ulcer, skin breakdown, erythema, warmth, callus or dry skin  Neurological: He is alert and oriented to person, place, and time  Skin: Skin is warm and dry  Psychiatric: He has a normal mood and affect  Thought content normal    Nursing note and vitals reviewed  Diabetic Foot Exam    Patient's shoes and socks removed  Right Foot/Ankle   Right Foot Inspection  Skin Exam: skin normal and skin intact no dry skin, no warmth, no callus, no erythema, no maceration, no abnormal color, no pre-ulcer, no ulcer and no callus                          Toe Exam: ROM and strength within normal limits  Sensory   Vibration: intact  Proprioception: intact   Monofilament testing: intact  Vascular    The right DP pulse is 1+  The right PT pulse is 1+  Left Foot/Ankle  Left Foot Inspection  Skin Exam: skin normal and skin intactno dry skin, no warmth, no erythema, no maceration, normal color, no pre-ulcer, no ulcer and no callus                         Toe Exam: ROM and strength within normal limits                   Sensory   Vibration: intact  Proprioception: intact  Monofilament: intact  Vascular    The left DP pulse is 1+  The left PT pulse is 1+     Assign Risk Category:  No deformity present;  No loss of protective sensation;        Risk: 0

## 2018-06-12 NOTE — ASSESSMENT & PLAN NOTE
Patient is a new onset diabetic  Diabetic education classes are offered  Low carb diet was reviewed    Regular aerobic exercise recommended

## 2018-07-16 DIAGNOSIS — F41.9 ANXIETY: ICD-10-CM

## 2018-07-16 DIAGNOSIS — G89.4 CHRONIC PAIN SYNDROME: ICD-10-CM

## 2018-07-16 RX ORDER — PREGABALIN 150 MG/1
150 CAPSULE ORAL 2 TIMES DAILY
Qty: 60 CAPSULE | Refills: 0 | Status: SHIPPED | OUTPATIENT
Start: 2018-07-16 | End: 2019-04-08 | Stop reason: SDUPTHER

## 2018-07-16 RX ORDER — DIAZEPAM 5 MG/1
5 TABLET ORAL EVERY 6 HOURS PRN
Qty: 30 TABLET | Refills: 0 | Status: SHIPPED | OUTPATIENT
Start: 2018-07-16 | End: 2018-08-21 | Stop reason: SDUPTHER

## 2018-07-16 RX ORDER — OXYCODONE HYDROCHLORIDE 5 MG/1
5 TABLET ORAL EVERY 6 HOURS PRN
Qty: 90 TABLET | Refills: 0 | Status: SHIPPED | OUTPATIENT
Start: 2018-07-16 | End: 2018-10-15 | Stop reason: SDUPTHER

## 2018-08-14 DIAGNOSIS — E78.2 MIXED HYPERLIPIDEMIA: Primary | ICD-10-CM

## 2018-08-14 RX ORDER — FENOFIBRATE 134 MG/1
CAPSULE ORAL
Qty: 90 CAPSULE | Refills: 3 | Status: SHIPPED | OUTPATIENT
Start: 2018-08-14 | End: 2019-08-05 | Stop reason: SDUPTHER

## 2018-08-21 DIAGNOSIS — F41.9 ANXIETY: ICD-10-CM

## 2018-08-21 DIAGNOSIS — E79.0 HYPERURICEMIA: Primary | ICD-10-CM

## 2018-08-21 RX ORDER — ALLOPURINOL 100 MG/1
100 TABLET ORAL DAILY
Qty: 30 TABLET | Refills: 3 | Status: SHIPPED | OUTPATIENT
Start: 2018-08-21 | End: 2018-11-05 | Stop reason: SDUPTHER

## 2018-08-21 RX ORDER — DIAZEPAM 5 MG/1
5 TABLET ORAL EVERY 6 HOURS PRN
Qty: 90 TABLET | Refills: 3 | Status: SHIPPED | OUTPATIENT
Start: 2018-08-21 | End: 2019-06-27

## 2018-08-21 RX ORDER — DIAZEPAM 5 MG/1
5 TABLET ORAL EVERY 6 HOURS PRN
Qty: 30 TABLET | Refills: 3 | Status: SHIPPED | OUTPATIENT
Start: 2018-08-21 | End: 2018-08-21 | Stop reason: SDUPTHER

## 2018-10-15 DIAGNOSIS — G89.4 CHRONIC PAIN SYNDROME: ICD-10-CM

## 2018-10-15 RX ORDER — OXYCODONE HYDROCHLORIDE 5 MG/1
5 TABLET ORAL EVERY 6 HOURS PRN
Qty: 90 TABLET | Refills: 0 | Status: SHIPPED | OUTPATIENT
Start: 2018-10-15 | End: 2019-01-11 | Stop reason: SDUPTHER

## 2018-11-05 DIAGNOSIS — E79.0 HYPERURICEMIA: ICD-10-CM

## 2018-11-05 RX ORDER — ALLOPURINOL 100 MG/1
100 TABLET ORAL DAILY
Qty: 30 TABLET | Refills: 0 | Status: SHIPPED | OUTPATIENT
Start: 2018-11-05 | End: 2018-12-29 | Stop reason: SDUPTHER

## 2018-12-06 LAB
ALBUMIN SERPL-MCNC: 4.2 G/DL (ref 3.6–5.1)
ALBUMIN/CREAT UR: 5 MCG/MG CREAT
ALBUMIN/GLOB SERPL: 1.8 (CALC) (ref 1–2.5)
ALP SERPL-CCNC: 69 U/L (ref 40–115)
ALT SERPL-CCNC: 23 U/L (ref 9–46)
APPEARANCE UR: CLEAR
AST SERPL-CCNC: 20 U/L (ref 10–35)
BILIRUB SERPL-MCNC: 0.9 MG/DL (ref 0.2–1.2)
BILIRUB UR QL STRIP: NEGATIVE
BUN SERPL-MCNC: 20 MG/DL (ref 7–25)
BUN/CREAT SERPL: ABNORMAL (CALC) (ref 6–22)
CALCIUM SERPL-MCNC: 9.7 MG/DL (ref 8.6–10.3)
CHLORIDE SERPL-SCNC: 103 MMOL/L (ref 98–110)
CHOLEST SERPL-MCNC: 121 MG/DL
CHOLEST/HDLC SERPL: 3.4 (CALC)
CO2 SERPL-SCNC: 28 MMOL/L (ref 20–32)
COLOR UR: YELLOW
CREAT SERPL-MCNC: 1.17 MG/DL (ref 0.7–1.25)
CREAT UR-MCNC: 115 MG/DL (ref 20–320)
GLOBULIN SER CALC-MCNC: 2.3 G/DL (CALC) (ref 1.9–3.7)
GLUCOSE SERPL-MCNC: 118 MG/DL (ref 65–99)
GLUCOSE UR QL STRIP: NEGATIVE
HBA1C MFR BLD: 6.3 % OF TOTAL HGB
HDLC SERPL-MCNC: 36 MG/DL
HGB UR QL STRIP: NEGATIVE
KETONES UR QL STRIP: NEGATIVE
LDLC SERPL CALC-MCNC: 62 MG/DL (CALC)
LEUKOCYTE ESTERASE UR QL STRIP: NEGATIVE
MICROALBUMIN UR-MCNC: 0.6 MG/DL
NITRITE UR QL STRIP: NEGATIVE
NONHDLC SERPL-MCNC: 85 MG/DL (CALC)
PH UR STRIP: 5.5 [PH] (ref 5–8)
POTASSIUM SERPL-SCNC: 4.1 MMOL/L (ref 3.5–5.3)
PROT SERPL-MCNC: 6.5 G/DL (ref 6.1–8.1)
PROT UR QL STRIP: NEGATIVE
SL AMB EGFR AFRICAN AMERICAN: 76 ML/MIN/1.73M2
SL AMB EGFR NON AFRICAN AMERICAN: 65 ML/MIN/1.73M2
SODIUM SERPL-SCNC: 139 MMOL/L (ref 135–146)
SP GR UR STRIP: 1.02 (ref 1–1.03)
TRIGL SERPL-MCNC: 142 MG/DL

## 2018-12-29 ENCOUNTER — OFFICE VISIT (OUTPATIENT)
Dept: FAMILY MEDICINE CLINIC | Facility: CLINIC | Age: 63
End: 2018-12-29
Payer: COMMERCIAL

## 2018-12-29 VITALS
WEIGHT: 262.8 LBS | HEIGHT: 72 IN | SYSTOLIC BLOOD PRESSURE: 122 MMHG | HEART RATE: 73 BPM | TEMPERATURE: 98.3 F | OXYGEN SATURATION: 92 % | DIASTOLIC BLOOD PRESSURE: 72 MMHG | BODY MASS INDEX: 35.59 KG/M2

## 2018-12-29 DIAGNOSIS — G47.33 OSA (OBSTRUCTIVE SLEEP APNEA): ICD-10-CM

## 2018-12-29 DIAGNOSIS — D68.59 HYPERCOAGULABLE STATE (HCC): ICD-10-CM

## 2018-12-29 DIAGNOSIS — Z12.5 PROSTATE CANCER SCREENING: ICD-10-CM

## 2018-12-29 DIAGNOSIS — M10.9 GOUT, UNSPECIFIED CAUSE, UNSPECIFIED CHRONICITY, UNSPECIFIED SITE: Primary | ICD-10-CM

## 2018-12-29 DIAGNOSIS — E78.5 HYPERLIPIDEMIA, UNSPECIFIED HYPERLIPIDEMIA TYPE: ICD-10-CM

## 2018-12-29 DIAGNOSIS — J44.9 CHRONIC OBSTRUCTIVE PULMONARY DISEASE, UNSPECIFIED COPD TYPE (HCC): ICD-10-CM

## 2018-12-29 DIAGNOSIS — E11.42 TYPE 2 DIABETES MELLITUS WITH DIABETIC POLYNEUROPATHY, WITHOUT LONG-TERM CURRENT USE OF INSULIN (HCC): ICD-10-CM

## 2018-12-29 DIAGNOSIS — M51.36 DEGENERATION OF INTERVERTEBRAL DISC OF LUMBAR REGION: ICD-10-CM

## 2018-12-29 DIAGNOSIS — I10 ESSENTIAL HYPERTENSION: ICD-10-CM

## 2018-12-29 DIAGNOSIS — E79.0 HYPERURICEMIA: ICD-10-CM

## 2018-12-29 PROCEDURE — 3074F SYST BP LT 130 MM HG: CPT | Performed by: FAMILY MEDICINE

## 2018-12-29 PROCEDURE — 1036F TOBACCO NON-USER: CPT | Performed by: FAMILY MEDICINE

## 2018-12-29 PROCEDURE — 3078F DIAST BP <80 MM HG: CPT | Performed by: FAMILY MEDICINE

## 2018-12-29 PROCEDURE — 99214 OFFICE O/P EST MOD 30 MIN: CPT | Performed by: FAMILY MEDICINE

## 2018-12-29 RX ORDER — DIAZEPAM 5 MG/1
TABLET ORAL
Refills: 3 | COMMUNITY
Start: 2018-10-14 | End: 2019-04-08 | Stop reason: SDUPTHER

## 2018-12-29 RX ORDER — ALLOPURINOL 100 MG/1
100 TABLET ORAL DAILY
Qty: 90 TABLET | Refills: 5 | Status: SHIPPED | OUTPATIENT
Start: 2018-12-29 | End: 2020-03-16

## 2018-12-29 NOTE — PROGRESS NOTES
Assessment/Plan:    Return visit 6 months with fasting blood work prior to visit  Problem List Items Addressed This Visit     Chronic obstructive pulmonary disease (COPD) ([address] )     Continue supplemental oxygen home  Follow-up with pulmonology         Essential hypertension    Gout - Primary     Continue allopurinol 100 mg daily         Relevant Orders    Uric acid    Hypercoagulable state (HCC)     Continue Xarelto 20 mg daily         Hyperlipidemia    Relevant Orders    Comprehensive metabolic panel    Lipid panel    NAS (obstructive sleep apnea)     Continue CPAP         Degeneration of intervertebral disc of lumbar region    Type 2 diabetes mellitus with diabetic polyneuropathy, without long-term current use of insulin (HCC)     Lab Results   Component Value Date    HGBA1C 6 3 (H) 12/05/2018       No results for input(s): POCGLU in the last 72 hours  Blood Sugar Average: Last 72 hrs:   continue low carb diet and regular exercise  Relevant Orders    CBC and differential    Hemoglobin A1C      Other Visit Diagnoses     Hyperuricemia        Relevant Medications    allopurinol (ZYLOPRIM) 100 mg tablet    Prostate cancer screening        Relevant Orders    PSA, Total Screen            Subjective:      Patient ID: Carolina Olvera is a 58 y o  male  Patient comes in for checkup  He complains of burning and numbness of his feet  The following portions of the patient's history were reviewed and updated as appropriate: allergies, current medications, past family history, past medical history, past social history, past surgical history and problem list     Review of Systems   Constitutional: Negative  HENT: Negative  Respiratory: Positive for shortness of breath  Cardiovascular: Negative  Gastrointestinal: Negative            Objective:      /72   Pulse 73   Temp 98 3 °F (36 8 °C)   Ht 6' (1 829 m)   Wt 119 kg (262 lb 12 8 oz)   SpO2 92%   BMI 35 64 kg/m²          Physical Exam   Constitutional: He is oriented to person, place, and time  He appears well-developed and well-nourished  HENT:   Head: Normocephalic and atraumatic  Right Ear: Tympanic membrane normal    Left Ear: Tympanic membrane normal    Eyes: Pupils are equal, round, and reactive to light  EOM are normal    Neck: Neck supple  Cardiovascular: Normal rate, regular rhythm and normal heart sounds  Pulses are no weak pulses  Pulses:       Dorsalis pedis pulses are 1+ on the right side, and 1+ on the left side  Posterior tibial pulses are 1+ on the right side, and 1+ on the left side  Pulmonary/Chest: Effort normal and breath sounds normal    Abdominal: Soft  Bowel sounds are normal    Musculoskeletal: Normal range of motion  Feet:   Right Foot:   Skin Integrity: Negative for ulcer, skin breakdown, erythema, warmth, callus or dry skin  Left Foot:   Skin Integrity: Negative for ulcer, skin breakdown, erythema, warmth, callus or dry skin  Neurological: He is alert and oriented to person, place, and time  Skin: Skin is warm and dry  Psychiatric: He has a normal mood and affect  Thought content normal      Diabetic Foot Exam    Patient's shoes and socks removed  Right Foot/Ankle   Right Foot Inspection  Skin Exam: skin normal and skin intact no dry skin, no warmth, no callus, no erythema, no maceration, no abnormal color, no pre-ulcer, no ulcer and no callus                          Toe Exam: ROM and strength within normal limits  Sensory   Vibration: diminished  Proprioception: diminished   Monofilament testing: diminished  Vascular    The right DP pulse is 1+  The right PT pulse is 1+       Left Foot/Ankle  Left Foot Inspection  Skin Exam: skin normal and skin intactno dry skin, no warmth, no erythema, no maceration, normal color, no pre-ulcer, no ulcer and no callus                         Toe Exam: ROM and strength within normal limits                   Sensory   Vibration: diminished  Proprioception: diminished  Monofilament: diminished  Vascular    The left DP pulse is 1+  The left PT pulse is 1+  Assign Risk Category:  No deformity present; Loss of protective sensation;  No weak pulses       Risk: 1

## 2018-12-29 NOTE — ASSESSMENT & PLAN NOTE
Lab Results   Component Value Date    HGBA1C 6 3 (H) 12/05/2018       No results for input(s): POCGLU in the last 72 hours  Blood Sugar Average: Last 72 hrs:   continue low carb diet and regular exercise

## 2019-01-07 DIAGNOSIS — I10 ESSENTIAL HYPERTENSION: ICD-10-CM

## 2019-01-07 RX ORDER — METOPROLOL SUCCINATE 50 MG/1
50 TABLET, EXTENDED RELEASE ORAL DAILY
Qty: 90 TABLET | Refills: 3 | Status: SHIPPED | OUTPATIENT
Start: 2019-01-07 | End: 2019-12-16 | Stop reason: SDUPTHER

## 2019-01-11 ENCOUNTER — TELEPHONE (OUTPATIENT)
Dept: FAMILY MEDICINE CLINIC | Facility: CLINIC | Age: 64
End: 2019-01-11

## 2019-01-11 DIAGNOSIS — G89.4 CHRONIC PAIN SYNDROME: ICD-10-CM

## 2019-01-11 RX ORDER — OXYCODONE HYDROCHLORIDE 5 MG/1
5 TABLET ORAL EVERY 6 HOURS PRN
Qty: 90 TABLET | Refills: 0 | Status: SHIPPED | OUTPATIENT
Start: 2019-01-11 | End: 2019-04-08 | Stop reason: SDUPTHER

## 2019-01-11 NOTE — TELEPHONE ENCOUNTER
Pt and needs refill for oxycodone  Site check-ok  [facility]

## 2019-02-18 ENCOUNTER — OFFICE VISIT (OUTPATIENT)
Dept: FAMILY MEDICINE CLINIC | Facility: CLINIC | Age: 64
End: 2019-02-18
Payer: COMMERCIAL

## 2019-02-18 VITALS
TEMPERATURE: 98.7 F | BODY MASS INDEX: 36.16 KG/M2 | DIASTOLIC BLOOD PRESSURE: 76 MMHG | OXYGEN SATURATION: 92 % | HEIGHT: 72 IN | SYSTOLIC BLOOD PRESSURE: 134 MMHG | HEART RATE: 96 BPM | WEIGHT: 267 LBS

## 2019-02-18 DIAGNOSIS — J44.9 CHRONIC OBSTRUCTIVE PULMONARY DISEASE, UNSPECIFIED COPD TYPE (HCC): Primary | ICD-10-CM

## 2019-02-18 PROBLEM — M54.50 LOW BACK PAIN: Status: ACTIVE | Noted: 2019-02-18

## 2019-02-18 PROBLEM — R59.0 MEDIASTINAL LYMPHADENOPATHY: Status: ACTIVE | Noted: 2017-11-28

## 2019-02-18 PROCEDURE — 3008F BODY MASS INDEX DOCD: CPT | Performed by: PHYSICIAN ASSISTANT

## 2019-02-18 PROCEDURE — 99214 OFFICE O/P EST MOD 30 MIN: CPT | Performed by: PHYSICIAN ASSISTANT

## 2019-02-18 PROCEDURE — 1036F TOBACCO NON-USER: CPT | Performed by: PHYSICIAN ASSISTANT

## 2019-02-18 RX ORDER — AZITHROMYCIN 250 MG/1
TABLET, FILM COATED ORAL
Qty: 6 TABLET | Refills: 0 | Status: SHIPPED | OUTPATIENT
Start: 2019-02-18 | End: 2019-02-22

## 2019-02-18 RX ORDER — PREDNISONE 20 MG/1
TABLET ORAL
Qty: 15 TABLET | Refills: 0 | Status: SHIPPED | OUTPATIENT
Start: 2019-02-18 | End: 2019-06-27

## 2019-02-18 NOTE — PROGRESS NOTES
Assessment/Plan:       Diagnoses and all orders for this visit:    Chronic obstructive pulmonary disease, unspecified COPD type ([facility] )  -     azithromycin (ZITHROMAX) 250 mg tablet; Take 2 tablets today then 1 tablet daily x 4 days  -     predniSONE 20 mg tablet; 5 po day 1, 4 po day 2, 3 pi day 3 2 po day 4 1 po day 5            Subjective:      Patient ID: Carolina Olvera is a 58 y o  male  Shortness of Breath   This is a recurrent problem  The current episode started yesterday  The problem occurs constantly  The problem has been gradually worsening  Associated symptoms include sputum production and wheezing  Pertinent negatives include no abdominal pain, chest pain, ear pain, fever, headaches, PND or sore throat  The symptoms are aggravated by any activity  He has tried beta agonist inhalers, OTC cough suppressants and steroid inhalers for the symptoms  The treatment provided mild relief  His past medical history is significant for COPD  Diarrhea    This is a new problem  The current episode started in the past 7 days  The problem occurs 2 to 4 times per day  The problem has been waxing and waning  The patient states that diarrhea does not awaken him from sleep  Associated symptoms include coughing and a URI  Pertinent negatives include no abdominal pain, bloating, chills, fever, headaches or increased  flatus  Nothing aggravates the symptoms  There are no known risk factors  He has tried nothing for the symptoms  Cough   This is a recurrent problem  The current episode started in the past 7 days  The problem has been gradually worsening  The problem occurs every few minutes  The cough is productive of purulent sputum  Associated symptoms include nasal congestion, postnasal drip, shortness of breath and wheezing  Pertinent negatives include no chest pain, chills, ear congestion, ear pain, fever, headaches or sore throat  Nothing aggravates the symptoms   He has tried a beta-agonist inhaler, leukotriene antagonists, OTC cough suppressant and steroid inhaler for the symptoms  His past medical history is significant for COPD  The following portions of the patient's history were reviewed and updated as appropriate:   He has a past medical history of H/O colonoscopy (08/22/2011) and Synovitis and tenosynovitis  ,  does not have any pertinent problems on file  ,   has a past surgical history that includes Knee surgery (Right) and Lumbar laminectomy  ,  family history includes Alzheimer's disease in his mother; Lung cancer in his father; Multiple sclerosis in his father  ,   reports that he has quit smoking  He has never used smokeless tobacco  He reports that he does not drink alcohol or use drugs  ,  is allergic to penicillins     Current Outpatient Medications   Medication Sig Dispense Refill    albuterol (PROAIR HFA) 90 mcg/act inhaler Inhale 2 puffs every 4 (four) hours as needed      allopurinol (ZYLOPRIM) 100 mg tablet Take 1 tablet (100 mg total) by mouth daily 90 tablet 5    ANORO ELLIPTA 62 5-25 MCG/INH AEPB       atorvastatin (LIPITOR) 40 mg tablet Take 1 tablet (40 mg total) by mouth daily 90 tablet 3    diazepam (VALIUM) 5 mg tablet TAKE 1 TABLET (5 MG TOTAL) BY MOUTH EVERY 6 (SIX) HOURS AS NEEDED FOR ANXIETY FOR UP TO 30 DAYS  3    fenofibrate micronized (LOFIBRA) 134 MG capsule TAKE ONE CAPSULE BY MOUTH EVERY DAY 90 capsule 3    Fluticasone Furoate (ARNUITY ELLIPTA) 100 MCG/ACT AEPB Inhale 1 puff daily      metoprolol succinate (TOPROL-XL) 50 mg 24 hr tablet Take 1 tablet (50 mg total) by mouth daily 90 tablet 3    Multiple Vitamin (DAILY VALUE MULTIVITAMIN) TABS Take 1 tablet by mouth daily      oxyCODONE (ROXICODONE) 5 mg immediate release tablet Take 1 tablet (5 mg total) by mouth every 6 (six) hours as needed for moderate pain Max Daily Amount: 20 mg 90 tablet 0    pregabalin (LYRICA) 150 mg capsule Take 1 capsule (150 mg total) by mouth 2 (two) times a day 60 capsule 0    Umeclidinium-Vilanterol 62 5-25 MCG/INH AEPB Inhale 62 5 mcg      XARELTO 20 MG tablet TAKE 1 TABLET BY MOUTH EVERY DAY 90 tablet 3    azithromycin (ZITHROMAX) 250 mg tablet Take 2 tablets today then 1 tablet daily x 4 days 6 tablet 0    diazepam (VALIUM) 5 mg tablet Take 1 tablet (5 mg total) by mouth every 6 (six) hours as needed for anxiety for up to 30 days 90 tablet 3    predniSONE 20 mg tablet 5 po day 1, 4 po day 2, 3 pi day 3 2 po day 4 1 po day 5 15 tablet 0     No current facility-administered medications for this visit  Review of Systems   Constitutional: Negative for chills and fever  HENT: Positive for congestion and postnasal drip  Negative for ear pain, sinus pressure, sinus pain, sore throat and trouble swallowing  Eyes: Negative for pain  Respiratory: Positive for cough, sputum production, chest tightness, shortness of breath and wheezing  Cardiovascular: Negative for chest pain and PND  Gastrointestinal: Positive for diarrhea  Negative for abdominal distention, abdominal pain, bloating and flatus  Skin: Negative  Neurological: Positive for dizziness  Negative for light-headedness and headaches  Objective:  Vitals:    02/18/19 1526   BP: 134/76   Pulse: 96   Temp: 98 7 °F (37 1 °C)   SpO2: 92%   Weight: 121 kg (267 lb)   Height: 6' (1 829 m)     Body mass index is 36 21 kg/m²  Physical Exam   Constitutional: He is oriented to person, place, and time  He appears well-developed and well-nourished  HENT:   Head: Normocephalic  Mouth/Throat: No oropharyngeal exudate or posterior oropharyngeal edema  Neck: Normal range of motion  Neck supple  Cardiovascular: Normal rate, regular rhythm and normal heart sounds  Pulmonary/Chest: Accessory muscle usage present  Tachypnea noted  He has wheezes in the right middle field, the right lower field, the left middle field and the left lower field  Lymphadenopathy:     He has no cervical adenopathy     Neurological: He is alert and oriented to person, place, and time  Skin: Skin is warm  Nursing note and vitals reviewed  BMI Counseling: Body mass index is 36 21 kg/m²  Discussed the patient's BMI with him  The BMI is above average  No BMI follow-up plan is appropriate  Patient is in an urgent or emergent medical situation

## 2019-04-08 DIAGNOSIS — E78.5 HYPERLIPIDEMIA, UNSPECIFIED HYPERLIPIDEMIA TYPE: ICD-10-CM

## 2019-04-08 DIAGNOSIS — G89.4 CHRONIC PAIN SYNDROME: ICD-10-CM

## 2019-04-08 RX ORDER — PREGABALIN 150 MG/1
150 CAPSULE ORAL 2 TIMES DAILY
Qty: 60 CAPSULE | Refills: 5 | Status: SHIPPED | OUTPATIENT
Start: 2019-04-08 | End: 2019-06-27 | Stop reason: SDUPTHER

## 2019-04-08 RX ORDER — OXYCODONE HYDROCHLORIDE 5 MG/1
5 TABLET ORAL EVERY 6 HOURS PRN
Qty: 90 TABLET | Refills: 0 | Status: SHIPPED | OUTPATIENT
Start: 2019-04-08 | End: 2019-06-27 | Stop reason: SDUPTHER

## 2019-04-08 RX ORDER — ATORVASTATIN CALCIUM 40 MG/1
40 TABLET, FILM COATED ORAL DAILY
Qty: 90 TABLET | Refills: 3 | Status: SHIPPED | OUTPATIENT
Start: 2019-04-08 | End: 2020-03-23

## 2019-04-08 RX ORDER — DIAZEPAM 5 MG/1
TABLET ORAL
Qty: 90 TABLET | Refills: 3 | Status: SHIPPED | OUTPATIENT
Start: 2019-04-08 | End: 2019-06-27

## 2019-04-12 ENCOUNTER — TELEPHONE (OUTPATIENT)
Dept: FAMILY MEDICINE CLINIC | Facility: CLINIC | Age: 64
End: 2019-04-12

## 2019-05-03 DIAGNOSIS — O22.30 DVT (DEEP VEIN THROMBOSIS) IN PREGNANCY: ICD-10-CM

## 2019-06-21 PROBLEM — E66.01 CLASS 2 SEVERE OBESITY WITH SERIOUS COMORBIDITY AND BODY MASS INDEX (BMI) OF 36.0 TO 36.9 IN ADULT (HCC): Status: ACTIVE | Noted: 2019-06-21

## 2019-06-21 PROBLEM — E66.812 CLASS 2 SEVERE OBESITY WITH SERIOUS COMORBIDITY AND BODY MASS INDEX (BMI) OF 36.0 TO 36.9 IN ADULT (HCC): Status: ACTIVE | Noted: 2019-06-21

## 2019-06-22 LAB
ALBUMIN SERPL-MCNC: 4.2 G/DL (ref 3.6–5.1)
ALBUMIN/GLOB SERPL: 1.6 (CALC) (ref 1–2.5)
ALP SERPL-CCNC: 72 U/L (ref 40–115)
ALT SERPL-CCNC: 28 U/L (ref 9–46)
AST SERPL-CCNC: 25 U/L (ref 10–35)
BASOPHILS # BLD AUTO: 58 CELLS/UL (ref 0–200)
BASOPHILS NFR BLD AUTO: 0.8 %
BILIRUB SERPL-MCNC: 0.8 MG/DL (ref 0.2–1.2)
BUN SERPL-MCNC: 19 MG/DL (ref 7–25)
BUN/CREAT SERPL: ABNORMAL (CALC) (ref 6–22)
CALCIUM SERPL-MCNC: 9.9 MG/DL (ref 8.6–10.3)
CHLORIDE SERPL-SCNC: 107 MMOL/L (ref 98–110)
CHOLEST SERPL-MCNC: 124 MG/DL
CHOLEST/HDLC SERPL: 3.4 (CALC)
CO2 SERPL-SCNC: 30 MMOL/L (ref 20–32)
CREAT SERPL-MCNC: 1.19 MG/DL (ref 0.7–1.25)
EOSINOPHIL # BLD AUTO: 122 CELLS/UL (ref 15–500)
EOSINOPHIL NFR BLD AUTO: 1.7 %
ERYTHROCYTE [DISTWIDTH] IN BLOOD BY AUTOMATED COUNT: 12.6 % (ref 11–15)
GLOBULIN SER CALC-MCNC: 2.6 G/DL (CALC) (ref 1.9–3.7)
GLUCOSE SERPL-MCNC: 131 MG/DL (ref 65–99)
HBA1C MFR BLD: 6.5 % OF TOTAL HGB
HCT VFR BLD AUTO: 47.1 % (ref 38.5–50)
HDLC SERPL-MCNC: 36 MG/DL
HGB BLD-MCNC: 16.2 G/DL (ref 13.2–17.1)
LDLC SERPL CALC-MCNC: 68 MG/DL (CALC)
LYMPHOCYTES # BLD AUTO: 2383 CELLS/UL (ref 850–3900)
LYMPHOCYTES NFR BLD AUTO: 33.1 %
MCH RBC QN AUTO: 31.6 PG (ref 27–33)
MCHC RBC AUTO-ENTMCNC: 34.4 G/DL (ref 32–36)
MCV RBC AUTO: 92 FL (ref 80–100)
MONOCYTES # BLD AUTO: 547 CELLS/UL (ref 200–950)
MONOCYTES NFR BLD AUTO: 7.6 %
NEUTROPHILS # BLD AUTO: 4090 CELLS/UL (ref 1500–7800)
NEUTROPHILS NFR BLD AUTO: 56.8 %
NONHDLC SERPL-MCNC: 88 MG/DL (CALC)
PLATELET # BLD AUTO: 242 THOUSAND/UL (ref 140–400)
PMV BLD REES-ECKER: 9.7 FL (ref 7.5–12.5)
POTASSIUM SERPL-SCNC: 4.7 MMOL/L (ref 3.5–5.3)
PROT SERPL-MCNC: 6.8 G/DL (ref 6.1–8.1)
PSA SERPL-MCNC: 2.1 NG/ML
RBC # BLD AUTO: 5.12 MILLION/UL (ref 4.2–5.8)
SL AMB EGFR AFRICAN AMERICAN: 74 ML/MIN/1.73M2
SL AMB EGFR NON AFRICAN AMERICAN: 64 ML/MIN/1.73M2
SODIUM SERPL-SCNC: 144 MMOL/L (ref 135–146)
TRIGL SERPL-MCNC: 116 MG/DL
URATE SERPL-MCNC: 5.9 MG/DL (ref 4–8)
WBC # BLD AUTO: 7.2 THOUSAND/UL (ref 3.8–10.8)

## 2019-06-27 ENCOUNTER — OFFICE VISIT (OUTPATIENT)
Dept: FAMILY MEDICINE CLINIC | Facility: CLINIC | Age: 64
End: 2019-06-27
Payer: COMMERCIAL

## 2019-06-27 VITALS
HEART RATE: 64 BPM | TEMPERATURE: 97.9 F | RESPIRATION RATE: 16 BRPM | DIASTOLIC BLOOD PRESSURE: 70 MMHG | OXYGEN SATURATION: 91 % | BODY MASS INDEX: 34.95 KG/M2 | SYSTOLIC BLOOD PRESSURE: 124 MMHG | HEIGHT: 72 IN | WEIGHT: 258 LBS

## 2019-06-27 DIAGNOSIS — E11.42 TYPE 2 DIABETES MELLITUS WITH DIABETIC POLYNEUROPATHY, WITHOUT LONG-TERM CURRENT USE OF INSULIN (HCC): ICD-10-CM

## 2019-06-27 DIAGNOSIS — I82.409 DEEP VEIN THROMBOSIS (DVT) OF LOWER EXTREMITY, UNSPECIFIED CHRONICITY, UNSPECIFIED LATERALITY, UNSPECIFIED VEIN (HCC): Primary | ICD-10-CM

## 2019-06-27 DIAGNOSIS — E78.5 HYPERLIPIDEMIA, UNSPECIFIED HYPERLIPIDEMIA TYPE: ICD-10-CM

## 2019-06-27 DIAGNOSIS — M10.9 GOUT, UNSPECIFIED CAUSE, UNSPECIFIED CHRONICITY, UNSPECIFIED SITE: ICD-10-CM

## 2019-06-27 DIAGNOSIS — O22.30 DVT (DEEP VEIN THROMBOSIS) IN PREGNANCY: ICD-10-CM

## 2019-06-27 DIAGNOSIS — G89.4 CHRONIC PAIN SYNDROME: ICD-10-CM

## 2019-06-27 DIAGNOSIS — F41.9 ANXIETY: ICD-10-CM

## 2019-06-27 DIAGNOSIS — I10 ESSENTIAL HYPERTENSION: ICD-10-CM

## 2019-06-27 DIAGNOSIS — G47.33 OSA (OBSTRUCTIVE SLEEP APNEA): ICD-10-CM

## 2019-06-27 DIAGNOSIS — J44.9 CHRONIC OBSTRUCTIVE PULMONARY DISEASE, UNSPECIFIED COPD TYPE (HCC): ICD-10-CM

## 2019-06-27 PROCEDURE — 99214 OFFICE O/P EST MOD 30 MIN: CPT | Performed by: FAMILY MEDICINE

## 2019-06-27 PROCEDURE — 3078F DIAST BP <80 MM HG: CPT | Performed by: FAMILY MEDICINE

## 2019-06-27 PROCEDURE — 3074F SYST BP LT 130 MM HG: CPT | Performed by: FAMILY MEDICINE

## 2019-06-27 PROCEDURE — 1101F PT FALLS ASSESS-DOCD LE1/YR: CPT | Performed by: FAMILY MEDICINE

## 2019-06-27 PROCEDURE — 3008F BODY MASS INDEX DOCD: CPT | Performed by: FAMILY MEDICINE

## 2019-06-27 RX ORDER — PREGABALIN 150 MG/1
150 CAPSULE ORAL 2 TIMES DAILY
Qty: 60 CAPSULE | Refills: 5 | Status: SHIPPED | OUTPATIENT
Start: 2019-06-27 | End: 2019-09-30 | Stop reason: SDUPTHER

## 2019-06-27 RX ORDER — DIAZEPAM 5 MG/1
5 TABLET ORAL EVERY 6 HOURS PRN
Qty: 90 TABLET | Refills: 5 | Status: SHIPPED | OUTPATIENT
Start: 2019-06-27 | End: 2019-09-30 | Stop reason: SDUPTHER

## 2019-06-27 RX ORDER — OXYCODONE HYDROCHLORIDE 5 MG/1
5 TABLET ORAL EVERY 6 HOURS PRN
Qty: 90 TABLET | Refills: 0 | Status: SHIPPED | OUTPATIENT
Start: 2019-06-27 | End: 2019-09-30 | Stop reason: SDUPTHER

## 2019-06-28 ENCOUNTER — ANESTHESIA (OUTPATIENT)
Dept: GASTROENTEROLOGY | Facility: HOSPITAL | Age: 64
End: 2019-06-28

## 2019-06-28 ENCOUNTER — ANESTHESIA EVENT (OUTPATIENT)
Dept: GASTROENTEROLOGY | Facility: HOSPITAL | Age: 64
End: 2019-06-28

## 2019-06-28 ENCOUNTER — HOSPITAL ENCOUNTER (OUTPATIENT)
Dept: GASTROENTEROLOGY | Facility: HOSPITAL | Age: 64
Setting detail: OUTPATIENT SURGERY
Discharge: HOME | End: 2019-06-28
Attending: COLON & RECTAL SURGERY | Admitting: COLON & RECTAL SURGERY
Payer: COMMERCIAL

## 2019-06-28 VITALS
DIASTOLIC BLOOD PRESSURE: 60 MMHG | TEMPERATURE: 98.3 F | WEIGHT: 252.43 LBS | OXYGEN SATURATION: 93 % | SYSTOLIC BLOOD PRESSURE: 124 MMHG | HEIGHT: 73 IN | RESPIRATION RATE: 16 BRPM | HEART RATE: 62 BPM | BODY MASS INDEX: 33.46 KG/M2

## 2019-06-28 DIAGNOSIS — Z86.010 HISTORY OF COLONIC POLYPS: ICD-10-CM

## 2019-06-28 PROCEDURE — 88305 TISSUE EXAM BY PATHOLOGIST: CPT | Performed by: PATHOLOGY

## 2019-06-28 RX ORDER — PROPOFOL 10 MG/ML
INJECTION, EMULSION INTRAVENOUS AS NEEDED
Status: DISCONTINUED | OUTPATIENT
Start: 2019-06-28 | End: 2019-06-28 | Stop reason: SURG

## 2019-06-28 RX ORDER — SODIUM CHLORIDE, SODIUM LACTATE, POTASSIUM CHLORIDE, CALCIUM CHLORIDE 600; 310; 30; 20 MG/100ML; MG/100ML; MG/100ML; MG/100ML
125 INJECTION, SOLUTION INTRAVENOUS CONTINUOUS
Status: DISCONTINUED | OUTPATIENT
Start: 2019-06-28 | End: 2019-06-28

## 2019-06-28 RX ORDER — KETAMINE HYDROCHLORIDE 50 MG/ML
INJECTION, SOLUTION, CONCENTRATE INTRAMUSCULAR; INTRAVENOUS AS NEEDED
Status: DISCONTINUED | OUTPATIENT
Start: 2019-06-28 | End: 2019-06-28 | Stop reason: SURG

## 2019-06-28 RX ORDER — GLYCOPYRROLATE 0.2 MG/ML
INJECTION INTRAMUSCULAR; INTRAVENOUS AS NEEDED
Status: DISCONTINUED | OUTPATIENT
Start: 2019-06-28 | End: 2019-06-28 | Stop reason: SURG

## 2019-06-28 RX ADMIN — SODIUM CHLORIDE, SODIUM LACTATE, POTASSIUM CHLORIDE, AND CALCIUM CHLORIDE 125 ML/HR: .6; .31; .03; .02 INJECTION, SOLUTION INTRAVENOUS at 10:33

## 2019-06-28 RX ADMIN — PROPOFOL 20 MG: 10 INJECTION, EMULSION INTRAVENOUS at 11:43

## 2019-06-28 RX ADMIN — KETAMINE HYDROCHLORIDE 20 MG: 50 INJECTION INTRAMUSCULAR; INTRAVENOUS at 11:41

## 2019-06-28 RX ADMIN — PROPOFOL 70 MG: 10 INJECTION, EMULSION INTRAVENOUS at 11:41

## 2019-06-28 RX ADMIN — PROPOFOL 10 MG: 10 INJECTION, EMULSION INTRAVENOUS at 11:47

## 2019-06-28 RX ADMIN — PROPOFOL 20 MG: 10 INJECTION, EMULSION INTRAVENOUS at 11:45

## 2019-06-28 RX ADMIN — GLYCOPYRROLATE 0.2 MG: 0.2 INJECTION, SOLUTION INTRAMUSCULAR; INTRAVENOUS at 11:41

## 2019-06-28 NOTE — DISCHARGE INSTRUCTIONS
Colonoscopy   WHAT YOU NEED TO KNOW:   A colonoscopy is a procedure to examine the inside of your colon (intestine) with a scope  Polyps or tissue growths may have been removed during your colonoscopy  It is normal to feel bloated and to have some abdominal discomfort  You should be passing gas  If you have hemorrhoids or you had polyps removed, you may have a small amount of bleeding  DISCHARGE INSTRUCTIONS:   Seek care immediately if:   · You have a large amount of bright red blood in your bowel movements  · Your abdomen is hard and firm and you have severe pain  · You have sudden trouble breathing  Contact your healthcare provider if:   · You develop a rash or hives  · You have a fever within 24 hours of your procedure  · You have not had a bowel movement for 3 days after your procedure  · You have questions or concerns about your condition or care  Activity:   · Do not lift, strain, or run  for 3 days after your procedure  · Rest after your procedure  You have been given medicine to relax you  Do not  drive or make important decisions until the day after your procedure  Return to your normal activity as directed  · Relieve gas and discomfort from bloating  by lying on your right side with a heating pad on your abdomen  You may need to take short walks to help the gas move out  Eat small meals until bloating is relieved  If you had polyps removed: For 7 days after your procedure:  · Do not  take aspirin  · Do not  go on long car rides  Help prevent constipation:   · Eat a variety of healthy foods  Healthy foods include fruit, vegetables, whole-grain breads, low-fat dairy products, beans, lean meat, and fish  Ask if you need to be on a special diet  Your healthcare provider may recommend that you eat high-fiber foods such as cooked beans  Fiber helps you have regular bowel movements  · Drink liquids as directed    Adults should drink between 9 and 13 eight-ounce cups of liquid every day  Ask what amount is best for you  For most people, good liquids to drink are water, juice, and milk  · Exercise as directed  Talk to your healthcare provider about the best exercise plan for you  Exercise can help prevent constipation, decrease your blood pressure and improve your health  Follow up with your healthcare provider as directed:  Write down your questions so you remember to ask them during your visits  © 2017 [employer] Information is for End User's use only and may not be sold, redistributed or otherwise used for commercial purposes  All illustrations and images included in CareNotes® are the copyrighted property of [employer]  or Iban Delgado  The above information is an  only  It is not intended as medical advice for individual conditions or treatments  Talk to your doctor, nurse or pharmacist before following any medical regimen to see if it is safe and effective for you

## 2019-08-05 DIAGNOSIS — E78.2 MIXED HYPERLIPIDEMIA: ICD-10-CM

## 2019-08-05 RX ORDER — FENOFIBRATE 134 MG/1
134 CAPSULE ORAL DAILY
Qty: 90 CAPSULE | Refills: 3 | Status: SHIPPED | OUTPATIENT
Start: 2019-08-05 | End: 2020-07-08 | Stop reason: SDUPTHER

## 2019-09-17 LAB
LEFT EYE DIABETIC RETINOPATHY: NORMAL
RIGHT EYE DIABETIC RETINOPATHY: NORMAL

## 2019-09-30 DIAGNOSIS — G89.4 CHRONIC PAIN SYNDROME: ICD-10-CM

## 2019-09-30 DIAGNOSIS — F41.9 ANXIETY: ICD-10-CM

## 2019-09-30 RX ORDER — OXYCODONE HYDROCHLORIDE 5 MG/1
5 TABLET ORAL EVERY 6 HOURS PRN
Qty: 90 TABLET | Refills: 0 | Status: SHIPPED | OUTPATIENT
Start: 2019-09-30 | End: 2019-12-26 | Stop reason: SDUPTHER

## 2019-09-30 RX ORDER — PREGABALIN 150 MG/1
150 CAPSULE ORAL 2 TIMES DAILY
Qty: 60 CAPSULE | Refills: 5 | Status: SHIPPED | OUTPATIENT
Start: 2019-09-30 | End: 2019-10-21

## 2019-09-30 RX ORDER — DIAZEPAM 5 MG/1
5 TABLET ORAL EVERY 6 HOURS PRN
Qty: 90 TABLET | Refills: 5 | Status: SHIPPED | OUTPATIENT
Start: 2019-09-30 | End: 2019-12-26 | Stop reason: SDUPTHER

## 2019-10-21 ENCOUNTER — OFFICE VISIT (OUTPATIENT)
Dept: FAMILY MEDICINE CLINIC | Facility: CLINIC | Age: 64
End: 2019-10-21
Payer: COMMERCIAL

## 2019-10-21 VITALS
OXYGEN SATURATION: 97 % | HEART RATE: 72 BPM | WEIGHT: 262 LBS | SYSTOLIC BLOOD PRESSURE: 116 MMHG | DIASTOLIC BLOOD PRESSURE: 72 MMHG | TEMPERATURE: 98.4 F | BODY MASS INDEX: 35.49 KG/M2 | HEIGHT: 72 IN

## 2019-10-21 DIAGNOSIS — H25.13 NUCLEAR SCLEROTIC CATARACT OF BOTH EYES: ICD-10-CM

## 2019-10-21 DIAGNOSIS — Z01.818 PREOP GENERAL PHYSICAL EXAM: ICD-10-CM

## 2019-10-21 DIAGNOSIS — E66.01 CLASS 2 SEVERE OBESITY DUE TO EXCESS CALORIES WITH SERIOUS COMORBIDITY AND BODY MASS INDEX (BMI) OF 36.0 TO 36.9 IN ADULT (HCC): ICD-10-CM

## 2019-10-21 DIAGNOSIS — G89.4 CHRONIC PAIN SYNDROME: ICD-10-CM

## 2019-10-21 DIAGNOSIS — Z00.00 MEDICARE WELCOME EXAM: Primary | ICD-10-CM

## 2019-10-21 DIAGNOSIS — D68.59 HYPERCOAGULABLE STATE (HCC): ICD-10-CM

## 2019-10-21 DIAGNOSIS — E11.42 TYPE 2 DIABETES MELLITUS WITH DIABETIC POLYNEUROPATHY, WITHOUT LONG-TERM CURRENT USE OF INSULIN (HCC): ICD-10-CM

## 2019-10-21 PROBLEM — Z87.891 FORMER SMOKER: Status: ACTIVE | Noted: 2019-09-12

## 2019-10-21 PROBLEM — R91.8 MULTIPLE LUNG NODULES ON CT: Status: ACTIVE | Noted: 2019-09-12

## 2019-10-21 PROCEDURE — G0439 PPPS, SUBSEQ VISIT: HCPCS | Performed by: PHYSICIAN ASSISTANT

## 2019-10-21 PROCEDURE — 3008F BODY MASS INDEX DOCD: CPT | Performed by: PHYSICIAN ASSISTANT

## 2019-10-21 PROCEDURE — 99214 OFFICE O/P EST MOD 30 MIN: CPT | Performed by: PHYSICIAN ASSISTANT

## 2019-10-21 RX ORDER — PREGABALIN 150 MG/1
150 CAPSULE ORAL DAILY
Qty: 90 CAPSULE | Refills: 3
Start: 2019-10-21 | End: 2019-12-26 | Stop reason: SDUPTHER

## 2019-10-21 RX ORDER — OFLOXACIN 3 MG/ML
SOLUTION/ DROPS OPHTHALMIC
Refills: 3 | COMMUNITY
Start: 2019-10-11 | End: 2020-01-20

## 2019-10-21 RX ORDER — PREDNISOLONE ACETATE 10 MG/ML
SUSPENSION/ DROPS OPHTHALMIC
Refills: 3 | COMMUNITY
Start: 2019-10-11 | End: 2020-01-20

## 2019-10-21 NOTE — PROGRESS NOTES
Assessment and Plan:     Problem List Items Addressed This Visit        Endocrine    Type 2 diabetes mellitus with diabetic polyneuropathy, without long-term current use of insulin ([facility] )       Other    Hypercoagulable state ([facility] )    Class 2 severe obesity with serious comorbidity and body mass index (BMI) of 36 0 to 36 9 in adult [facility])    Medicare welcome exam - Primary    Nuclear sclerotic cataract of both eyes    Relevant Medications    ofloxacin (OCUFLOX) 0 3 % ophthalmic solution    prednisoLONE acetate (PRED FORTE) 1 % ophthalmic suspension      Other Visit Diagnoses     Preop general physical exam        Chronic pain syndrome        Relevant Medications    pregabalin (LYRICA) 150 mg capsule           Preventive health issues were discussed with patient, and age appropriate screening tests were ordered as noted in patient's After Visit Summary  Personalized health advice and appropriate referrals for health education or preventive services given if needed, as noted in patient's After Visit Summary       History of Present Illness:     Patient presents for Medicare Annual Wellness visit    Patient Care Team:  Kameron Quiroz MD as PCP - General  Lissa Miramontes MD     Problem List:     Patient Active Problem List   Diagnosis    Chronic obstructive pulmonary disease (COPD) ([facility] )    Essential hypertension    Gout    Hypercoagulable state ([facility] )    Hyperlipidemia    NAS (obstructive sleep apnea)    Degeneration of intervertebral disc of lumbar region    Type 2 diabetes mellitus with diabetic polyneuropathy, without long-term current use of insulin ([facility] )    Low back pain    DVT (deep venous thrombosis) ([address] )    ED (erectile dysfunction)    Edema    Mediastinal lymphadenopathy    Neuropathy    Pulmonary embolus (HCC)    Ventral hernia    Class 2 severe obesity with serious comorbidity and body mass index (BMI) of 36 0 to 36 9 in [facility])    Multiple lung nodules on CT    Former smoker   [facility] welcome exam    Nuclear sclerotic cataract of both eyes      Past Medical and Surgical History:     Past Medical History:   Diagnosis Date    Colon polyp     COPD (chronic obstructive pulmonary disease) ([facility] )     H/O colonoscopy 08/22/2011    DESC 8/22/2011    Hyperlipidemia     Hypertension     Shortness of breath     Sleep apnea     Synovitis and tenosynovitis      Past Surgical History:   Procedure Laterality Date    BACK SURGERY      COLONOSCOPY      HERNIA REPAIR      KNEE SURGERY Right     LUMBAR LAMINECTOMY        Family History:     Family History   Problem Relation Age of Onset    Alzheimer's disease Mother     Lung cancer Father     Multiple sclerosis Father       Social History:     Social History     Socioeconomic History    Marital status:      Spouse name: None    Number of children: None    Years of education: None    Highest education level: None   Occupational History    None   Social Needs    Financial resource strain: None    Food insecurity:     Worry: None     Inability: None    Transportation needs:     Medical: None     Non-medical: None   Tobacco Use    Smoking status: Former Smoker    Smokeless tobacco: Never Used    Tobacco comment: stopped 7 years ago   Substance and Sexual Activity    Alcohol use: No    Drug use: No    Sexual activity: None   Lifestyle    Physical activity:     Days per week: None     Minutes per session: None    Stress: None   Relationships    Social connections:     Talks on phone: None     Gets together: None     Attends Zoroastrian service: None     Active member of club or organization: None     Attends meetings of clubs or organizations: None     Relationship status: None    Intimate partner violence:     Fear of current or ex partner: None     Emotionally abused: None     Physically abused: None     Forced sexual activity: None   Other Topics Concern    None   Social History Narrative    ALWAYS USES SEAT BELTS     EXERCISES REGULARLY-STATIONARY BIKE    SEEING A DENTIST       Medications and Allergies:     Current Outpatient Medications   Medication Sig Dispense Refill    albuterol (PROAIR HFA) 90 mcg/act inhaler Inhale 2 puffs every 4 (four) hours as needed      allopurinol (ZYLOPRIM) 100 mg tablet Take 1 tablet (100 mg total) by mouth daily 90 tablet 5    ANORO ELLIPTA 62 5-25 MCG/INH AEPB       atorvastatin (LIPITOR) 40 mg tablet Take 1 tablet (40 mg total) by mouth daily 90 tablet 3    diazepam (VALIUM) 5 mg tablet Take 1 tablet (5 mg total) by mouth every 6 (six) hours as needed for anxiety 90 tablet 5    fenofibrate micronized (LOFIBRA) 134 MG capsule Take 1 capsule (134 mg total) by mouth daily 90 capsule 3    Fluticasone Furoate (ARNUITY ELLIPTA) 100 MCG/ACT AEPB Inhale 1 puff daily      metoprolol succinate (TOPROL-XL) 50 mg 24 hr tablet Take 1 tablet (50 mg total) by mouth daily 90 tablet 3    Multiple Vitamin (DAILY VALUE MULTIVITAMIN) TABS Take 1 tablet by mouth daily      ofloxacin (OCUFLOX) 0 3 % ophthalmic solution INSTILL 1 DROP INTO LEFT EYE FOUR TIMES A DAY  START THREE DAYS PRIOR TO SURGERY IN OPERATIVE EYE  3    oxyCODONE (ROXICODONE) 5 mg immediate release tablet Take 1 tablet (5 mg total) by mouth every 6 (six) hours as needed for moderate painMax Daily Amount: 20 mg 90 tablet 0    prednisoLONE acetate (PRED FORTE) 1 % ophthalmic suspension INSTILL 1 DROP INTO LEFT EYE FOUR TIMES A DAY AS DIRECTED USE AFTER SURGERY  3    pregabalin (LYRICA) 150 mg capsule Take 1 capsule (150 mg total) by mouth daily 90 capsule 3    rivaroxaban (XARELTO) 20 mg tablet Take 1 tablet (20 mg total) by mouth daily 90 tablet 3    Umeclidinium-Vilanterol 62 5-25 MCG/INH AEPB Inhale 62 5 mcg       No current facility-administered medications for this visit        Allergies   Allergen Reactions    Penicillins Hives      Immunizations:     Immunization History   Administered Date(s) Administered    INFLUENZA 10/31/2007, 10/29/2008, 10/28/2009, 11/03/2014, 09/18/2015, 09/15/2016, 09/30/2017, 09/28/2018    Influenza TIV (IM) 09/18/2015, 09/30/2017    Influenza, injectable, quadrivalent, preservative free 0 5 mL 09/15/2018    Pneumococcal Conjugate 13-Valent 09/21/2015, 09/15/2016    Pneumococcal Polysaccharide PPV23 12/12/2017    Zoster 02/29/2016    Zoster Vaccine Recombinant 09/13/2019    influenza, trivalent, adjuvanted 09/13/2019      Health Maintenance:         Topic Date Due    Hepatitis C Screening  Discontinued     There are no preventive care reminders to display for this patient  Medicare Health Risk Assessment:     /72   Pulse 72   Temp 98 4 °F (36 9 °C)   Ht 6' (1 829 m)   Wt 119 kg (262 lb)   SpO2 97%   BMI 35 53 kg/m²      Kiley Guillermo is here for his Subsequent Wellness visit  Last Medicare Wellness visit information reviewed, patient interviewed and updates made to the record today  Health Risk Assessment:   Patient rates overall health as fair  Patient feels that their physical health rating is slightly better  Eyesight was rated as much worse  Hearing was rated as same  Patient feels that their emotional and mental health rating is same  Pain experienced in the last 7 days has been some  Patient's pain rating has been 5/10  Patient states that he has experienced no weight loss or gain in last 6 months  Depression Screening:   PHQ-2 Score: 0      Fall Risk Screening: In the past year, patient has experienced: no history of falling in past year      Home Safety:  Patient has trouble with stairs inside or outside of their home  Patient has working smoke alarms and has working carbon monoxide detector  Home safety hazards include: none  Nutrition:   Current diet is Low Carb and Frequent junk food  Patient will also do a low sugar    Medications:   Patient is currently taking over-the-counter supplements   OTC medications include: see medication list  Patient is able to manage medications  Activities of Daily Living (ADLs)/Instrumental Activities of Daily Living (IADLs):   Walk and transfer into and out of bed and chair?: Yes  Dress and groom yourself?: Yes    Bathe or shower yourself?: Yes    Feed yourself?  Yes  Do your laundry/housekeeping?: Yes  Manage your money, pay your bills and track your expenses?: Yes  Make your own meals?: Yes    Do your own shopping?: Yes    Previous Hospitalizations:   Any hospitalizations or ED visits within the last 12 months?: No      Advance Care Planning:   Living will: No    Durable POA for healthcare: No    Advanced directive: No    Five wishes given: Yes      PREVENTIVE SCREENINGS      Cardiovascular Screening:    General: Screening Not Indicated and History Lipid Disorder      Diabetes Screening:     General: Screening Not Indicated and History Diabetes      Colorectal Cancer Screening:     General: Screening Current      Prostate Cancer Screening:    General: Screening Current      Abdominal Aortic Aneurysm (AAA) Screening:    Risk factors include: age between 73-69 yo and tobacco use        Lung Cancer Screening:     General: Screening Not Indicated      Hepatitis C Screening:    General: Screening Not Indicated      Alfonza Lesch, PA-C

## 2019-10-21 NOTE — PATIENT INSTRUCTIONS

## 2019-10-21 NOTE — PROGRESS NOTES
Subjective:     Mariela Barron is a 71 y o  male who presents to the office today for a preoperative consultation at the request of surgeon Jah Romeo MD who plans on performing phaco with IOL Left and then right on October 24 and November 7  This consultation is requested for the specific conditions prompting preoperative evaluation (i e  because of potential affect on operative risk): COPD, DM, h/o PE   Planned anesthesia: local  The patient has the following known anesthesia issues: none  Patients bleeding risk: no recent abnormal bleeding  Patient does not have objections to receiving blood products if needed  The following portions of the patient's history were reviewed and updated as appropriate:   He  has a past medical history of Colon polyp, COPD (chronic obstructive pulmonary disease) ([facility] [address] ), H/O colonoscopy (08/22/2011), Hyperlipidemia, Hypertension, Shortness of breath, Sleep apnea, and Synovitis and tenosynovitis    He   Patient Active Problem List    Diagnosis Date Noted    Medicare welcome exam 10/21/2019    Nuclear sclerotic cataract of both eyes 10/21/2019    Multiple lung nodules on CT 09/12/2019    Former smoker 09/12/2019    Class 2 severe obesity with serious comorbidity and body mass index (BMI) of 36 0 to 36 9 in adult [facility]) 06/21/2019    Low back pain 02/18/2019    Type 2 diabetes mellitus with diabetic polyneuropathy, without long-term current use of insulin ([address] ) 06/12/2018    NAS (obstructive sleep apnea) 12/12/2017    Hyperlipidemia 11/28/2017    Mediastinal lymphadenopathy 11/28/2017    Hypercoagulable state ([address] ) 06/09/2016    Pulmonary embolus ([address] ) 04/26/2016    Ventral hernia 10/13/2015    ED (erectile dysfunction) 06/08/2015    Neuropathy 12/04/2014    Chronic obstructive pulmonary disease (COPD) ([address] ) 11/10/2014    Essential hypertension 11/10/2014    Gout 11/10/2014    Degeneration of intervertebral disc of lumbar region 11/10/2014    DVT (deep venous thrombosis) ([facility] [address] ) 11/10/2014    Edema 11/10/2014     He  has a past surgical history that includes Knee surgery (Right); Lumbar laminectomy; Hernia repair; Back surgery; and Colonoscopy  His family history includes Alzheimer's disease in his mother; Lung cancer in his father; Multiple sclerosis in his father  He  reports that he has quit smoking  He has never used smokeless tobacco  He reports that he does not drink alcohol or use drugs  Current Outpatient Medications   Medication Sig Dispense Refill    albuterol (PROAIR HFA) 90 mcg/act inhaler Inhale 2 puffs every 4 (four) hours as needed      allopurinol (ZYLOPRIM) 100 mg tablet Take 1 tablet (100 mg total) by mouth daily 90 tablet 5    ANORO ELLIPTA 62 5-25 MCG/INH AEPB       atorvastatin (LIPITOR) 40 mg tablet Take 1 tablet (40 mg total) by mouth daily 90 tablet 3    diazepam (VALIUM) 5 mg tablet Take 1 tablet (5 mg total) by mouth every 6 (six) hours as needed for anxiety 90 tablet 5    fenofibrate micronized (LOFIBRA) 134 MG capsule Take 1 capsule (134 mg total) by mouth daily 90 capsule 3    Fluticasone Furoate (ARNUITY ELLIPTA) 100 MCG/ACT AEPB Inhale 1 puff daily      metoprolol succinate (TOPROL-XL) 50 mg 24 hr tablet Take 1 tablet (50 mg total) by mouth daily 90 tablet 3    Multiple Vitamin (DAILY VALUE MULTIVITAMIN) TABS Take 1 tablet by mouth daily      ofloxacin (OCUFLOX) 0 3 % ophthalmic solution INSTILL 1 DROP INTO LEFT EYE FOUR TIMES A DAY   START THREE DAYS PRIOR TO SURGERY IN OPERATIVE EYE  3    oxyCODONE (ROXICODONE) 5 mg immediate release tablet Take 1 tablet (5 mg total) by mouth every 6 (six) hours as needed for moderate painMax Daily Amount: 20 mg 90 tablet 0    prednisoLONE acetate (PRED FORTE) 1 % ophthalmic suspension INSTILL 1 DROP INTO LEFT EYE FOUR TIMES A DAY AS DIRECTED USE AFTER SURGERY  3    pregabalin (LYRICA) 150 mg capsule Take 1 capsule (150 mg total) by mouth daily 90 capsule 3    rivaroxaban (XARELTO) 20 mg tablet Take 1 tablet (20 mg total) by mouth daily 90 tablet 3    Umeclidinium-Vilanterol 62 5-25 MCG/INH AEPB Inhale 62 5 mcg       No current facility-administered medications for this visit  Current Outpatient Medications on File Prior to Visit   Medication Sig    albuterol (PROAIR HFA) 90 mcg/act inhaler Inhale 2 puffs every 4 (four) hours as needed    allopurinol (ZYLOPRIM) 100 mg tablet Take 1 tablet (100 mg total) by mouth daily    ANORO ELLIPTA 62 5-25 MCG/INH AEPB     atorvastatin (LIPITOR) 40 mg tablet Take 1 tablet (40 mg total) by mouth daily    diazepam (VALIUM) 5 mg tablet Take 1 tablet (5 mg total) by mouth every 6 (six) hours as needed for anxiety    fenofibrate micronized (LOFIBRA) 134 MG capsule Take 1 capsule (134 mg total) by mouth daily    Fluticasone Furoate (ARNUITY ELLIPTA) 100 MCG/ACT AEPB Inhale 1 puff daily    metoprolol succinate (TOPROL-XL) 50 mg 24 hr tablet Take 1 tablet (50 mg total) by mouth daily    Multiple Vitamin (DAILY VALUE MULTIVITAMIN) TABS Take 1 tablet by mouth daily    ofloxacin (OCUFLOX) 0 3 % ophthalmic solution INSTILL 1 DROP INTO LEFT EYE FOUR TIMES A DAY  START THREE DAYS PRIOR TO SURGERY IN OPERATIVE EYE    oxyCODONE (ROXICODONE) 5 mg immediate release tablet Take 1 tablet (5 mg total) by mouth every 6 (six) hours as needed for moderate painMax Daily Amount: 20 mg    prednisoLONE acetate (PRED FORTE) 1 % ophthalmic suspension INSTILL 1 DROP INTO LEFT EYE FOUR TIMES A DAY AS DIRECTED USE AFTER SURGERY    rivaroxaban (XARELTO) 20 mg tablet Take 1 tablet (20 mg total) by mouth daily    Umeclidinium-Vilanterol 62 5-25 MCG/INH AEPB Inhale 62 5 mcg    [DISCONTINUED] pregabalin (LYRICA) 150 mg capsule Take 1 capsule (150 mg total) by mouth 2 (two) times a day     No current facility-administered medications on file prior to visit  He is allergic to penicillins       Review of Systems  Pertinent items are noted in HPI       Objective:     /72 Pulse 72   Temp 98 4 °F (36 9 °C)   Ht 6' (1 829 m)   Wt 119 kg (262 lb)   SpO2 97%   BMI 35 53 kg/m²   General appearance: alert and oriented, in no acute distress  Head: Normocephalic, without obvious abnormality, atraumatic  Ears: normal TM's and external ear canals both ears  Nose: no discharge  Throat: abnormal findings: dentition: bridge  Neck: no adenopathy, no carotid bruit, supple, symmetrical, trachea midline and thyroid not enlarged, symmetric, no tenderness/mass/nodules  Back: negative  Lungs: diminished breath sounds  Chest wall: no tenderness  Heart: regular rate and rhythm, S1, S2 normal, no murmur, click, rub or gallop  Abdomen: soft, non-tender; bowel sounds normal; no masses,  no organomegaly  Extremities: extremities normal, warm and well-perfused; no cyanosis, clubbing, or edema and varicose veins noted  Pulses: 2+ and symmetric  Skin: Skin color, texture, turgor normal  No rashes or lesions  Lymph nodes: Cervical, supraclavicular, and axillary nodes normal   Neurologic: Grossly normal    Predictors of intubation difficulty:   Morbid obesity? no   Anatomically abnormal facies? no   Prominent incisors? no   Receding mandible? no   Short, thick neck? yes    Neck range of motion: normal   Mallampati score: II (hard and soft palate, upper portion of tonsils anduvula visible)   Dentition: upper bridge    Cardiographics  ECG: not done  Echocardiogram: not done    Imaging  Chest x-ray: not done     Lab Review   not applicable     Assessment:     71 y o  male with planned surgery as above  Known risk factors for perioperative complications: Diabetes mellitus    Difficulty with intubation is not anticipated  Cardiac Risk Estimation: moderate    Current medications which may produce withdrawal symptoms if withheld perioperatively: none      Plan:     1  Preoperative workup as follows none  2  Change in medication regimen before surgery: discontinue Xarelto 2 days before surgery    3  Prophylaxis for cardiac events with perioperative beta-blockers: not indicated  4  Invasive hemodynamic monitoring perioperatively: not indicated  5  Deep vein thrombosis prophylaxis postoperatively:regimen to be chosen by surgical team   6  Surveillance for postoperative MI with ECG immediately postoperatively and on postoperative days 1 and 2 AND troponin levels 24 hours postoperatively and on day 4 or hospital discharge (whichever comes first): not indicated  7  Other measures: Careful attention to perioperative glycemic control (Type 2 DM)

## 2019-12-16 DIAGNOSIS — I10 ESSENTIAL HYPERTENSION: ICD-10-CM

## 2019-12-16 RX ORDER — METOPROLOL SUCCINATE 50 MG/1
50 TABLET, EXTENDED RELEASE ORAL DAILY
Qty: 90 TABLET | Refills: 3 | Status: SHIPPED | OUTPATIENT
Start: 2019-12-16 | End: 2021-03-08 | Stop reason: SDUPTHER

## 2019-12-17 LAB
ALBUMIN SERPL-MCNC: 4.1 G/DL (ref 3.6–5.1)
ALBUMIN/CREAT UR: 6 MCG/MG CREAT
ALBUMIN/GLOB SERPL: 1.8 (CALC) (ref 1–2.5)
ALP SERPL-CCNC: 65 U/L (ref 40–115)
ALT SERPL-CCNC: 27 U/L (ref 9–46)
APPEARANCE UR: CLEAR
AST SERPL-CCNC: 25 U/L (ref 10–35)
BILIRUB SERPL-MCNC: 0.7 MG/DL (ref 0.2–1.2)
BILIRUB UR QL STRIP: NEGATIVE
BUN SERPL-MCNC: 17 MG/DL (ref 7–25)
BUN/CREAT SERPL: ABNORMAL (CALC) (ref 6–22)
CALCIUM SERPL-MCNC: 10 MG/DL (ref 8.6–10.3)
CHLORIDE SERPL-SCNC: 104 MMOL/L (ref 98–110)
CHOLEST SERPL-MCNC: 134 MG/DL
CHOLEST/HDLC SERPL: 3.4 (CALC)
CO2 SERPL-SCNC: 29 MMOL/L (ref 20–32)
COLOR UR: YELLOW
CREAT SERPL-MCNC: 1.16 MG/DL (ref 0.7–1.25)
CREAT UR-MCNC: 164 MG/DL (ref 20–320)
GLOBULIN SER CALC-MCNC: 2.3 G/DL (CALC) (ref 1.9–3.7)
GLUCOSE SERPL-MCNC: 146 MG/DL (ref 65–99)
GLUCOSE UR QL STRIP: NEGATIVE
HBA1C MFR BLD: 7 % OF TOTAL HGB
HDLC SERPL-MCNC: 39 MG/DL
HGB UR QL STRIP: NEGATIVE
KETONES UR QL STRIP: NEGATIVE
LDLC SERPL CALC-MCNC: 71 MG/DL (CALC)
LEUKOCYTE ESTERASE UR QL STRIP: NEGATIVE
MICROALBUMIN UR-MCNC: 1 MG/DL
NITRITE UR QL STRIP: NEGATIVE
NONHDLC SERPL-MCNC: 95 MG/DL (CALC)
PH UR STRIP: 5.5 [PH] (ref 5–8)
POTASSIUM SERPL-SCNC: 4.2 MMOL/L (ref 3.5–5.3)
PROT SERPL-MCNC: 6.4 G/DL (ref 6.1–8.1)
PROT UR QL STRIP: NEGATIVE
SL AMB EGFR AFRICAN AMERICAN: 76 ML/MIN/1.73M2
SL AMB EGFR NON AFRICAN AMERICAN: 66 ML/MIN/1.73M2
SODIUM SERPL-SCNC: 141 MMOL/L (ref 135–146)
SP GR UR STRIP: 1.02 (ref 1–1.03)
TRIGL SERPL-MCNC: 158 MG/DL

## 2019-12-26 ENCOUNTER — OFFICE VISIT (OUTPATIENT)
Dept: FAMILY MEDICINE CLINIC | Facility: CLINIC | Age: 64
End: 2019-12-26
Payer: COMMERCIAL

## 2019-12-26 VITALS
TEMPERATURE: 98.4 F | OXYGEN SATURATION: 95 % | HEART RATE: 66 BPM | HEIGHT: 72 IN | WEIGHT: 268 LBS | DIASTOLIC BLOOD PRESSURE: 78 MMHG | SYSTOLIC BLOOD PRESSURE: 112 MMHG | BODY MASS INDEX: 36.3 KG/M2

## 2019-12-26 DIAGNOSIS — N52.9 ERECTILE DYSFUNCTION, UNSPECIFIED ERECTILE DYSFUNCTION TYPE: ICD-10-CM

## 2019-12-26 DIAGNOSIS — E11.42 TYPE 2 DIABETES MELLITUS WITH DIABETIC POLYNEUROPATHY, WITHOUT LONG-TERM CURRENT USE OF INSULIN (HCC): ICD-10-CM

## 2019-12-26 DIAGNOSIS — F41.9 ANXIETY: ICD-10-CM

## 2019-12-26 DIAGNOSIS — Z12.5 PROSTATE CANCER SCREENING: ICD-10-CM

## 2019-12-26 DIAGNOSIS — I10 ESSENTIAL HYPERTENSION: ICD-10-CM

## 2019-12-26 DIAGNOSIS — G89.4 CHRONIC PAIN SYNDROME: ICD-10-CM

## 2019-12-26 DIAGNOSIS — J44.9 CHRONIC OBSTRUCTIVE PULMONARY DISEASE, UNSPECIFIED COPD TYPE (HCC): Primary | ICD-10-CM

## 2019-12-26 DIAGNOSIS — D68.59 HYPERCOAGULABLE STATE (HCC): ICD-10-CM

## 2019-12-26 DIAGNOSIS — E78.5 HYPERLIPIDEMIA, UNSPECIFIED HYPERLIPIDEMIA TYPE: ICD-10-CM

## 2019-12-26 DIAGNOSIS — G47.33 OSA (OBSTRUCTIVE SLEEP APNEA): ICD-10-CM

## 2019-12-26 DIAGNOSIS — M10.9 GOUT, UNSPECIFIED CAUSE, UNSPECIFIED CHRONICITY, UNSPECIFIED SITE: ICD-10-CM

## 2019-12-26 PROCEDURE — 99214 OFFICE O/P EST MOD 30 MIN: CPT | Performed by: FAMILY MEDICINE

## 2019-12-26 PROCEDURE — 3074F SYST BP LT 130 MM HG: CPT | Performed by: FAMILY MEDICINE

## 2019-12-26 PROCEDURE — 3008F BODY MASS INDEX DOCD: CPT | Performed by: FAMILY MEDICINE

## 2019-12-26 PROCEDURE — 3078F DIAST BP <80 MM HG: CPT | Performed by: FAMILY MEDICINE

## 2019-12-26 PROCEDURE — 3725F SCREEN DEPRESSION PERFORMED: CPT | Performed by: FAMILY MEDICINE

## 2019-12-26 PROCEDURE — 1036F TOBACCO NON-USER: CPT | Performed by: FAMILY MEDICINE

## 2019-12-26 RX ORDER — DIAZEPAM 5 MG/1
5 TABLET ORAL EVERY 6 HOURS PRN
Qty: 90 TABLET | Refills: 5 | Status: SHIPPED | OUTPATIENT
Start: 2019-12-26 | End: 2020-06-25 | Stop reason: SDUPTHER

## 2019-12-26 RX ORDER — TADALAFIL 20 MG/1
20 TABLET ORAL DAILY PRN
Qty: 100 TABLET | Refills: 5 | Status: SHIPPED | OUTPATIENT
Start: 2019-12-26

## 2019-12-26 RX ORDER — OXYCODONE HYDROCHLORIDE 5 MG/1
5 TABLET ORAL EVERY 6 HOURS PRN
Qty: 90 TABLET | Refills: 0 | Status: SHIPPED | OUTPATIENT
Start: 2019-12-26 | End: 2020-03-30 | Stop reason: SDUPTHER

## 2019-12-26 RX ORDER — PREGABALIN 150 MG/1
150 CAPSULE ORAL DAILY
Qty: 90 CAPSULE | Refills: 3 | Status: SHIPPED | OUTPATIENT
Start: 2019-12-26 | End: 2020-06-25 | Stop reason: SDUPTHER

## 2019-12-26 NOTE — PATIENT INSTRUCTIONS
Basic Carbohydrate Counting   AMBULATORY CARE:   Carbohydrate counting  is a way to plan your meals by counting the amount of carbohydrate in foods  Carbohydrates are the sugars, starches, and fiber found in fruit, grains, vegetables, and milk products  Carbohydrates increase your blood sugar levels  Carbohydrate counting can help you eat the right amount of carbohydrate to keep your blood sugar levels under control  What you need to know about planning meals using carbohydrate counting:  · A dietitian or healthcare provider will help you develop a healthy meal plan that works best for you  You will be taught how much carbohydrate to eat or drink for each meal and snack  Your meal plan will be based on your age, weight, usual food intake, and physical activity level  If you have diabetes, it will also include your blood sugar levels and diabetes medicine  Once you know how much carbohydrate you should eat, you can decide what type of food you want to eat  · You will need to know what foods contain carbohydrate and how much they contain  Keep track of the amount of carbohydrate in meals and snacks in order to follow your meal plan  Do not avoid carbohydrates or skip meals  Your blood sugar may fall too low if you do not eat enough carbohydrate or you skip meals  Foods that contain carbohydrate:   · Breads:  Each serving of food listed below contains about 15 g of carbohydrate   ¨ 1 slice of bread (1 ounce) or 1 flour or corn tortilla (6 inch)    ¨ ½ of a hamburger bun or ¼ of a large bagel (about 1 ounce)    ¨ 1 pancake (about 4 inches across and ¼ inch thick)    · Cereals and grains:  Serving sizes of ready-to-eat cereals vary  Look at the serving size and the total carbohydrate amount listed on the food label  Each serving of food listed below contains about 15 g of carbohydrate       ¨ ¾ cup of dry, unsweetened, ready-to-eat cereal or ¼ cup of low-fat granola     ¨ ½ cup of oatmeal or other cooked cereal ¨ ? cup of cooked rice or pasta    · Starchy vegetables and beans:  Each serving of food listed below contains about 15 g of carbohydrate   ¨ ½ cup of corn, green peas, sweet potatoes, or mashed potatoes    ¨ ¼ of a large baked potato    ¨ ½ cup of beans, lentils, and peas (garbanzo, chin, kidney, white, split, black-eyed)    · Crackers and snacks:  Each serving of food listed below contains about 15 g of carbohydrate   ¨ 3 susie cracker squares or 8 animal crackers     ¨ 6 saltine-type crackers    ¨ 3 cups of popcorn or ¾ ounce of pretzels, potato chips, or tortilla chips    · Fruit:  Each serving of food listed below contains about 15 g of carbohydrate   ¨ 1 small (4 ounce) piece of fresh fruit or ¾ to 1 cup of fresh fruit    ¨ ½ cup of canned or frozen fruit, packed in natural juice    ¨ ½ cup (4 ounces) of unsweetened fruit juice    ¨ 2 tablespoons of dried fruit    · Desserts or sugary foods:  Each serving of food listed below contains about 15 g of carbohydrate   ¨ 2-inch square unfrosted cake or brownie     ¨ 2 small cookies    ¨ ½ cup of ice cream, frozen yogurt, or nondairy frozen yogurt    ¨ ¼ cup of sherbet or sorbet    ¨ 1 tablespoon of regular syrup, jam, or jelly    ¨ 2 tablespoons of light syrup    · Milk and yogurt:  Foods from the milk group contain about 12 g of carbohydrate per serving  ¨ 1 cup of fat-free or low-fat milk    ¨ 1 cup of soy milk    ¨ ? cup of fat-free, yogurt sweetened with artificial sweetener    · Non-starchy vegetables:  Each serving contains about 5 g of carbohydrate   Three servings of non-starch vegetables count as 1 carbohydrate serving  ¨ ½ cup of cooked vegetables or 1 cup of raw vegetables  This includes beets, broccoli, cabbage, cauliflower, cucumber, mushrooms, tomatoes, and zucchini    ¨ ½ cup of vegetable juice  How to use carbohydrate counting to plan meals:   · Count carbohydrate amounts using serving sizes:      ¨ Pasta dinner example:   You plan to have pasta, tossed salad, and an 8-ounce glass of milk  Your healthcare provider tells you that you may have 4 carbohydrate servings for dinner  One carbohydrate serving of pasta is ? cup  One cup of pasta will equal 3 carbohydrate servings  An 8-ounce glass of milk will count as 1 carbohydrate serving  These amounts of food would equal 4 carbohydrate servings  One cup of tossed salad does not count toward your carbohydrate servings  · Count carbohydrate amounts using food labels:  Find the total amount of carbohydrate in a packaged food by reading the food label  Food labels tell you the serving size of the food and the total carbohydrate amount in each serving  Find the serving size on the food label and then decide how many servings you will eat  Multiply the number of servings you plan to eat by the carbohydrate amount per serving  ¨ Granola bar snack example: Your meal plan allows you to have 2 carbohydrate servings (30 grams) of carbohydrate for a snack  You plan to eat 1 package of granola bars, which contains 2 bars  According to the food label, the serving size of food in this package is 1 bar  Each serving (1 bar) contains 25 grams of carbohydrate  The total amount of carbohydrate in this package of granola bars would be 50 g  Based on your meal plan, you should eat only 1 bar  Follow up with your healthcare provider as directed:  Write down your questions so you remember to ask them during your visits  © 2017 [employer] Information is for End User's use only and may not be sold, redistributed or otherwise used for commercial purposes  All illustrations and images included in CareNotes® are the copyrighted property of [employer] , [employer]  or [employer] Delgado  The above information is an  only  It is not intended as medical advice for individual conditions or treatments   Talk to your doctor, nurse or pharmacist before following any medical regimen to see if it is safe and effective for you

## 2019-12-26 NOTE — PROGRESS NOTES
Assessment/Plan:    Return visit in 6 months with fasting blood work prior to visit   Diagnoses and all orders for this visit:    Chronic obstructive pulmonary disease, unspecified COPD type ([facility] )    NAS (obstructive sleep apnea)    Type 2 diabetes mellitus with diabetic polyneuropathy, without long-term current use of insulin ([facility])  -     Hemoglobin A1C; Future    Essential hypertension    Hyperlipidemia, unspecified hyperlipidemia type  -     Comprehensive metabolic panel; Future  -     Lipid panel; Future    Hypercoagulable state ([place] [address] )  -     CBC and differential; Future    Anxiety  -     diazepam (VALIUM) 5 mg tablet; Take 1 tablet (5 mg total) by mouth every 6 (six) hours as needed for anxiety    Chronic pain syndrome  -     oxyCODONE (ROXICODONE) 5 mg immediate release tablet; Take 1 tablet (5 mg total) by mouth every 6 (six) hours as needed for moderate painMax Daily Amount: 20 mg  -     pregabalin (LYRICA) 150 mg capsule; Take 1 capsule (150 mg total) by mouth daily    Gout, unspecified cause, unspecified chronicity, unspecified site  -     Uric acid; Future    Prostate cancer screening  -     PSA, Total Screen; Future    Erectile dysfunction, unspecified erectile dysfunction type  -     tadalafil (CIALIS) 20 MG tablet; Take 1 tablet (20 mg total) by mouth daily as needed for erectile dysfunction        Type 2 diabetes mellitus with diabetic polyneuropathy, without long-term current use of insulin ([facility])    Lab Results   Component Value Date    HGBA1C 7 0 (H) 12/16/2019    Continue low carb diet and weight loss    Hypercoagulable state ([facility] )  Continue Xarelto    Gout  Continue allopurinol         Subjective:      Patient ID: Moe Llanos is a 71 y o  male  Patient comes in for checkup  He complains of erectile dysfunction  He has used Cialis in the past without problems        The following portions of the patient's history were reviewed and updated as appropriate:   He has a past medical history of Colon polyp, COPD (chronic obstructive pulmonary disease) ([facility] [address] ), H/O colonoscopy (08/22/2011), Hyperlipidemia, Hypertension, Shortness of breath, Sleep apnea, and Synovitis and tenosynovitis  ,  does not have any pertinent problems on file  ,   has a past surgical history that includes Knee surgery (Right); Lumbar laminectomy; Hernia repair; Back surgery; and Colonoscopy  ,  family history includes Alzheimer's disease in his mother; Lung cancer in his father; Multiple sclerosis in his father  ,   reports that he has quit smoking  He has never used smokeless tobacco  He reports that he does not drink alcohol or use drugs  ,  is allergic to penicillins     Current Outpatient Medications   Medication Sig Dispense Refill    albuterol (PROAIR HFA) 90 mcg/act inhaler Inhale 2 puffs every 4 (four) hours as needed      allopurinol (ZYLOPRIM) 100 mg tablet Take 1 tablet (100 mg total) by mouth daily 90 tablet 5    ANORO ELLIPTA 62 5-25 MCG/INH AEPB       atorvastatin (LIPITOR) 40 mg tablet Take 1 tablet (40 mg total) by mouth daily 90 tablet 3    diazepam (VALIUM) 5 mg tablet Take 1 tablet (5 mg total) by mouth every 6 (six) hours as needed for anxiety 90 tablet 5    fenofibrate micronized (LOFIBRA) 134 MG capsule Take 1 capsule (134 mg total) by mouth daily 90 capsule 3    Fluticasone Furoate (ARNUITY ELLIPTA) 100 MCG/ACT AEPB Inhale 1 puff daily      metoprolol succinate (TOPROL-XL) 50 mg 24 hr tablet Take 1 tablet (50 mg total) by mouth daily 90 tablet 3    Multiple Vitamin (DAILY VALUE MULTIVITAMIN) TABS Take 1 tablet by mouth daily      ofloxacin (OCUFLOX) 0 3 % ophthalmic solution INSTILL 1 DROP INTO LEFT EYE FOUR TIMES A DAY   START THREE DAYS PRIOR TO SURGERY IN OPERATIVE EYE  3    oxyCODONE (ROXICODONE) 5 mg immediate release tablet Take 1 tablet (5 mg total) by mouth every 6 (six) hours as needed for moderate painMax Daily Amount: 20 mg 90 tablet 0    pregabalin (LYRICA) 150 mg capsule Take 1 capsule (150 mg total) by mouth daily 90 capsule 3    rivaroxaban (XARELTO) 20 mg tablet Take 1 tablet (20 mg total) by mouth daily 90 tablet 3    Umeclidinium-Vilanterol 62 5-25 MCG/INH AEPB Inhale 62 5 mcg      prednisoLONE acetate (PRED FORTE) 1 % ophthalmic suspension INSTILL 1 DROP INTO LEFT EYE FOUR TIMES A DAY AS DIRECTED USE AFTER SURGERY  3    tadalafil (CIALIS) 20 MG tablet Take 1 tablet (20 mg total) by mouth daily as needed for erectile dysfunction 100 tablet 5     No current facility-administered medications for this visit  Review of Systems   Constitutional: Negative  Respiratory: Negative  Cardiovascular: Negative  Gastrointestinal: Negative  Objective:  Vitals:    12/26/19 0826   BP: 112/78   Pulse: 66   Temp: 98 4 °F (36 9 °C)   SpO2: 95%   Weight: 122 kg (268 lb)   Height: 6' (1 829 m)     Body mass index is 36 35 kg/m²  Physical Exam   Constitutional: He is oriented to person, place, and time  He appears well-developed  Obese   HENT:   Head: Normocephalic and atraumatic  Right Ear: Tympanic membrane and external ear normal    Left Ear: Tympanic membrane and external ear normal    Eyes: Pupils are equal, round, and reactive to light  EOM are normal    Neck: Neck supple  Cardiovascular: Normal rate, regular rhythm and normal heart sounds  Pulses are no weak pulses  Pulses:       Dorsalis pedis pulses are 1+ on the right side, and 1+ on the left side  Posterior tibial pulses are 1+ on the right side, and 1+ on the left side  Pulmonary/Chest: Effort normal and breath sounds normal    Abdominal: Soft  Bowel sounds are normal    Musculoskeletal: Normal range of motion  Feet:   Right Foot:   Skin Integrity: Negative for ulcer, skin breakdown, erythema, warmth, callus or dry skin  Left Foot:   Skin Integrity: Negative for ulcer, skin breakdown, erythema, warmth, callus or dry skin  Neurological: He is alert and oriented to person, place, and time     Skin: Skin is warm and dry  Psychiatric: He has a normal mood and affect  Thought content normal      Diabetic Foot Exam    Patient's shoes and socks removed  Right Foot/Ankle   Right Foot Inspection  Skin Exam: skin normal and skin intact no dry skin, no warmth, no callus, no erythema, no maceration, no abnormal color, no pre-ulcer, no ulcer and no callus                          Toe Exam: ROM and strength within normal limits  Sensory   Vibration: diminished  Proprioception: diminished   Monofilament testing: diminished  Vascular    The right DP pulse is 1+  The right PT pulse is 1+  Left Foot/Ankle  Left Foot Inspection  Skin Exam: skin normal and skin intactno dry skin, no warmth, no erythema, no maceration, normal color, no pre-ulcer, no ulcer and no callus                         Toe Exam: ROM and strength within normal limits                   Sensory   Vibration: diminished  Proprioception: diminished  Monofilament: diminished  Vascular    The left DP pulse is 1+  The left PT pulse is 1+  Assign Risk Category:  No deformity present; Loss of protective sensation;  No weak pulses       Risk: 1

## 2020-01-20 ENCOUNTER — OFFICE VISIT (OUTPATIENT)
Dept: FAMILY MEDICINE CLINIC | Facility: CLINIC | Age: 65
End: 2020-01-20
Payer: COMMERCIAL

## 2020-01-20 VITALS
RESPIRATION RATE: 20 BRPM | HEART RATE: 64 BPM | DIASTOLIC BLOOD PRESSURE: 68 MMHG | TEMPERATURE: 98.4 F | OXYGEN SATURATION: 94 % | SYSTOLIC BLOOD PRESSURE: 116 MMHG | WEIGHT: 256 LBS | BODY MASS INDEX: 34.67 KG/M2 | HEIGHT: 72 IN

## 2020-01-20 DIAGNOSIS — B37.0 THRUSH: Primary | ICD-10-CM

## 2020-01-20 PROCEDURE — 3078F DIAST BP <80 MM HG: CPT | Performed by: NURSE PRACTITIONER

## 2020-01-20 PROCEDURE — 1036F TOBACCO NON-USER: CPT | Performed by: NURSE PRACTITIONER

## 2020-01-20 PROCEDURE — 1160F RVW MEDS BY RX/DR IN RCRD: CPT | Performed by: NURSE PRACTITIONER

## 2020-01-20 PROCEDURE — 99213 OFFICE O/P EST LOW 20 MIN: CPT | Performed by: NURSE PRACTITIONER

## 2020-01-20 PROCEDURE — 3074F SYST BP LT 130 MM HG: CPT | Performed by: NURSE PRACTITIONER

## 2020-01-20 PROCEDURE — 3008F BODY MASS INDEX DOCD: CPT | Performed by: NURSE PRACTITIONER

## 2020-01-20 NOTE — PROGRESS NOTES
Assessment/Plan:     Chronic Problems:  No problem-specific Assessment & Plan notes found for this encounter  Visit Diagnosis:  Diagnoses and all orders for this visit:    Thrush  Comments: Will give nystatin liquid, swish and spit for 7 days  Orders:  -     nystatin (MYCOSTATIN) 500,000 units/5 mL suspension; Apply 5 mL (500,000 Units total) to the mouth or throat 4 (four) times a day for 7 days          Subjective:    Patient ID: Kd Kraus is a 71 y o  male  Here with fungal infection in mouth  Was seen by dentist who said it was thrush, but that he couldn't treat it  He was sent to oral surgeon who said his mouth was fine  He is still having some white patches in his mouth  The following portions of the patient's history were reviewed and updated as appropriate: allergies, current medications, past family history, past medical history, past social history, past surgical history and problem list     Review of Systems   Constitutional: Negative for chills, fatigue and fever  Respiratory: Negative for chest tightness, shortness of breath and wheezing  Cardiovascular: Negative for chest pain and palpitations  Skin:        thrush in mouth   Psychiatric/Behavioral: Negative for dysphoric mood and sleep disturbance  The patient is not nervous/anxious            /68   Pulse 64   Temp 98 4 °F (36 9 °C) (Tympanic)   Resp 20   Ht 6' (1 829 m)   Wt 116 kg (256 lb)   SpO2 94%   BMI 34 72 kg/m²   Social History     Socioeconomic History    Marital status:      Spouse name: Not on file    Number of children: Not on file    Years of education: Not on file    Highest education level: Not on file   Occupational History    Not on file   Social Needs    Financial resource strain: Not on file    Food insecurity:     Worry: Not on file     Inability: Not on file    Transportation needs:     Medical: Not on file     Non-medical: Not on file   Tobacco Use    Smoking status: Former Smoker    Smokeless tobacco: Never Used    Tobacco comment: stopped 7 years ago   Substance and Sexual Activity    Alcohol use: No    Drug use: No    Sexual activity: Not on file   Lifestyle    Physical activity:     Days per week: Not on file     Minutes per session: Not on file    Stress: Not on file   Relationships    Social connections:     Talks on phone: Not on file     Gets together: Not on file     Attends Protestant service: Not on file     Active member of club or organization: Not on file     Attends meetings of clubs or organizations: Not on file     Relationship status: Not on file    Intimate partner violence:     Fear of current or ex partner: Not on file     Emotionally abused: Not on file     Physically abused: Not on file     Forced sexual activity: Not on file   Other Topics Concern    Not on file   Social History Narrative    ALWAYS USES SEAT BELTS        EXERCISES REGULARLY-STATIONARY BIKE    SEEING A DENTIST     Past Medical History:   Diagnosis Date    Colon polyp     [facility]     H/O colonoscopy 08/22/2011    DESC 8/22/2011    Hyperlipidemia     Hypertension     Shortness of breath     Sleep apnea     Synovitis and tenosynovitis      Family History   Problem Relation Age of Onset    Alzheimer's disease Mother     Lung cancer Father     Multiple sclerosis Father      Past Surgical History:   Procedure Laterality Date    BACK SURGERY      COLONOSCOPY      HERNIA REPAIR      KNEE SURGERY Right     LUMBAR LAMINECTOMY         Current Outpatient Medications:     albuterol (PROAIR HFA) 90 mcg/act inhaler, Inhale 2 puffs every 4 (four) hours as needed, Disp: , Rfl:     allopurinol (ZYLOPRIM) 100 mg tablet, Take 1 tablet (100 mg total) by mouth daily, Disp: 90 tablet, Rfl: 5    ANORO ELLIPTA 62 5-25 MCG/INH AEPB, , Disp: , Rfl:     atorvastatin (LIPITOR) 40 mg tablet, Take 1 tablet (40 mg total) by mouth daily, Disp: 90 tablet, Rfl: 3    diazepam (VALIUM) 5 mg tablet, Take 1 tablet (5 mg total) by mouth every 6 (six) hours as needed for anxiety, Disp: 90 tablet, Rfl: 5    fenofibrate micronized (LOFIBRA) 134 MG capsule, Take 1 capsule (134 mg total) by mouth daily, Disp: 90 capsule, Rfl: 3    Fluticasone Furoate (ARNUITY ELLIPTA) 100 MCG/ACT AEPB, Inhale 1 puff daily, Disp: , Rfl:     metoprolol succinate (TOPROL-XL) 50 mg 24 hr tablet, Take 1 tablet (50 mg total) by mouth daily, Disp: 90 tablet, Rfl: 3    Multiple Vitamin (DAILY VALUE MULTIVITAMIN) TABS, Take 1 tablet by mouth daily, Disp: , Rfl:     oxyCODONE (ROXICODONE) 5 mg immediate release tablet, Take 1 tablet (5 mg total) by mouth every 6 (six) hours as needed for moderate painMax Daily Amount: 20 mg, Disp: 90 tablet, Rfl: 0    pregabalin (LYRICA) 150 mg capsule, Take 1 capsule (150 mg total) by mouth daily, Disp: 90 capsule, Rfl: 3    rivaroxaban (XARELTO) 20 mg tablet, Take 1 tablet (20 mg total) by mouth daily, Disp: 90 tablet, Rfl: 3    tadalafil (CIALIS) 20 MG tablet, Take 1 tablet (20 mg total) by mouth daily as needed for erectile dysfunction, Disp: 100 tablet, Rfl: 5    Umeclidinium-Vilanterol 62 5-25 MCG/INH AEPB, Inhale 62 5 mcg, Disp: , Rfl:     nystatin (MYCOSTATIN) 500,000 units/5 mL suspension, Apply 5 mL (500,000 Units total) to the mouth or throat 4 (four) times a day for 7 days, Disp: 473 mL, Rfl: 0    Allergies   Allergen Reactions    Penicillins Hives          Lab Review   Orders Only on 12/16/2019   Component Date Value    Total Cholesterol 12/16/2019 134     HDL 12/16/2019 39*    Triglycerides 12/16/2019 158*    LDL Direct 12/16/2019 71     Chol HDLC Ratio 12/16/2019 3 4     Non-HDL Cholesterol 12/16/2019 95     Creatinine, Urine 12/16/2019 164     Microalbum  ,U,Random 12/16/2019 1 0     Microalb/Creat Ratio 12/16/2019 6     Glucose, Random 12/16/2019 146*    BUN 12/16/2019 17     Creatinine 12/16/2019 1 16     eGFR Non  12/16/2019 66     eGFR  12/16/2019 76     SL AMB BUN/CREATININE RA* 94/58/2516 NOT APPLICABLE     Sodium 95/18/2826 141     Potassium 12/16/2019 4 2     Chloride 12/16/2019 104     CO2 12/16/2019 29     SL AMB CALCIUM 12/16/2019 10 0     Protein, Total 12/16/2019 6 4     Albumin 12/16/2019 4 1     Globulin 12/16/2019 2 3     Albumin/Globulin Ratio 12/16/2019 1 8     TOTAL BILIRUBIN 12/16/2019 0 7     Alkaline Phosphatase 12/16/2019 65     AST 12/16/2019 25     ALT 12/16/2019 27     Hemoglobin A1C 12/16/2019 7 0*    Color UA 12/16/2019 YELLOW     Urine Appearance 12/16/2019 CLEAR     Specific Gravity 12/16/2019 1 021     Ph 12/16/2019 5 5     Glucose, Urine 12/16/2019 NEGATIVE     Bilirubin, Urine 12/16/2019 NEGATIVE     Ketone, Urine 12/16/2019 NEGATIVE     Blood, Urine 12/16/2019 NEGATIVE     Protein, Urine 12/16/2019 NEGATIVE     SL AMB NITRITES URINE, Q* 12/16/2019 NEGATIVE     Leukocyte Esterase 12/16/2019 NEGATIVE         Imaging: No results found  Objective:     Physical Exam   Constitutional: He appears well-developed  HENT:   Right Ear: External ear normal    Left Ear: External ear normal    Mouth/Throat: Oral lesions present  Eyes: Pupils are equal, round, and reactive to light  Conjunctivae are normal    Neck: Normal range of motion  Cardiovascular: Normal rate, regular rhythm and normal heart sounds  No murmur heard  Pulmonary/Chest: Effort normal and breath sounds normal  No respiratory distress  He has no wheezes  Lymphadenopathy:     He has no cervical adenopathy  Patient Instructions   Use nystatin 4 times a day, swish and then spit it out  TAYLER Bland    Portions of the record may have been created with voice recognition software  Occasional wrong word or "sound a like" substitutions may have occurred due to the inherent limitations of voice recognition software    Read the chart carefully and recognize, using context, where substitutions have occurred

## 2020-03-15 DIAGNOSIS — E79.0 HYPERURICEMIA: ICD-10-CM

## 2020-03-16 RX ORDER — ALLOPURINOL 100 MG/1
TABLET ORAL
Qty: 90 TABLET | Refills: 5 | Status: SHIPPED | OUTPATIENT
Start: 2020-03-16 | End: 2021-03-09

## 2020-03-21 DIAGNOSIS — E78.5 HYPERLIPIDEMIA, UNSPECIFIED HYPERLIPIDEMIA TYPE: ICD-10-CM

## 2020-03-23 RX ORDER — ATORVASTATIN CALCIUM 40 MG/1
TABLET, FILM COATED ORAL
Qty: 90 TABLET | Refills: 3 | Status: SHIPPED | OUTPATIENT
Start: 2020-03-23 | End: 2020-12-28

## 2020-03-30 DIAGNOSIS — G89.4 CHRONIC PAIN SYNDROME: ICD-10-CM

## 2020-03-30 NOTE — TELEPHONE ENCOUNTER
03/29/2020  1  12/26/2019  PREGABALIN 150 MG CAPSULE  90 0  90  BR ANNIE  [id number]  PENNS ([id number])  1   [place]are  [place]    03/28/2020  1  12/26/2019  DIAZEPAM 5 MG TABLET  90 0  23  BR ANNIE  [id number]  PENNS ([id number])  1   Private Pay  [place]    12/26/2019  1  12/26/2019  OXYCODONE HCL 5 MG TABLET  90 0  23  BR ANNIE  [id number]  PENNS ([id number])  0  29 35 MME  Medicare  [place]    12/26/2019  1  12/26/2019  PREGABALIN 150 MG CAPSULE  90 0  90  BR ANNIE  [id number]

## 2020-03-31 RX ORDER — OXYCODONE HYDROCHLORIDE 5 MG/1
5 TABLET ORAL EVERY 6 HOURS PRN
Qty: 90 TABLET | Refills: 0 | Status: SHIPPED | OUTPATIENT
Start: 2020-03-31 | End: 2020-06-25 | Stop reason: SDUPTHER

## 2020-06-19 LAB
ALBUMIN SERPL-MCNC: 4.2 G/DL (ref 3.6–5.1)
ALBUMIN/GLOB SERPL: 2.1 (CALC) (ref 1–2.5)
ALP SERPL-CCNC: 63 U/L (ref 35–144)
ALT SERPL-CCNC: 17 U/L (ref 9–46)
AST SERPL-CCNC: 19 U/L (ref 10–35)
BASOPHILS # BLD AUTO: 50 CELLS/UL (ref 0–200)
BASOPHILS NFR BLD AUTO: 0.8 %
BILIRUB SERPL-MCNC: 0.7 MG/DL (ref 0.2–1.2)
BUN SERPL-MCNC: 21 MG/DL (ref 7–25)
BUN/CREAT SERPL: NORMAL (CALC) (ref 6–22)
CALCIUM SERPL-MCNC: 9.8 MG/DL (ref 8.6–10.3)
CHLORIDE SERPL-SCNC: 105 MMOL/L (ref 98–110)
CHOLEST SERPL-MCNC: 108 MG/DL
CHOLEST/HDLC SERPL: 2.6 (CALC)
CO2 SERPL-SCNC: 30 MMOL/L (ref 20–32)
CREAT SERPL-MCNC: 1.23 MG/DL (ref 0.7–1.25)
EOSINOPHIL # BLD AUTO: 192 CELLS/UL (ref 15–500)
EOSINOPHIL NFR BLD AUTO: 3.1 %
ERYTHROCYTE [DISTWIDTH] IN BLOOD BY AUTOMATED COUNT: 13.3 % (ref 11–15)
GLOBULIN SER CALC-MCNC: 2 G/DL (CALC) (ref 1.9–3.7)
GLUCOSE SERPL-MCNC: 82 MG/DL (ref 65–99)
HBA1C MFR BLD: 5.6 % OF TOTAL HGB
HCT VFR BLD AUTO: 45.9 % (ref 38.5–50)
HDLC SERPL-MCNC: 42 MG/DL
HGB BLD-MCNC: 15.3 G/DL (ref 13.2–17.1)
LDLC SERPL CALC-MCNC: 50 MG/DL (CALC)
LYMPHOCYTES # BLD AUTO: 2889 CELLS/UL (ref 850–3900)
LYMPHOCYTES NFR BLD AUTO: 46.6 %
MCH RBC QN AUTO: 31.2 PG (ref 27–33)
MCHC RBC AUTO-ENTMCNC: 33.3 G/DL (ref 32–36)
MCV RBC AUTO: 93.5 FL (ref 80–100)
MONOCYTES # BLD AUTO: 508 CELLS/UL (ref 200–950)
MONOCYTES NFR BLD AUTO: 8.2 %
NEUTROPHILS # BLD AUTO: 2561 CELLS/UL (ref 1500–7800)
NEUTROPHILS NFR BLD AUTO: 41.3 %
NONHDLC SERPL-MCNC: 66 MG/DL (CALC)
PLATELET # BLD AUTO: 215 THOUSAND/UL (ref 140–400)
PMV BLD REES-ECKER: 9.9 FL (ref 7.5–12.5)
POTASSIUM SERPL-SCNC: 4.6 MMOL/L (ref 3.5–5.3)
PROT SERPL-MCNC: 6.2 G/DL (ref 6.1–8.1)
PSA SERPL-MCNC: 2.4 NG/ML
RBC # BLD AUTO: 4.91 MILLION/UL (ref 4.2–5.8)
SL AMB EGFR AFRICAN AMERICAN: 70 ML/MIN/1.73M2
SL AMB EGFR NON AFRICAN AMERICAN: 61 ML/MIN/1.73M2
SODIUM SERPL-SCNC: 142 MMOL/L (ref 135–146)
TRIGL SERPL-MCNC: 84 MG/DL
URATE SERPL-MCNC: 4.9 MG/DL (ref 4–8)
WBC # BLD AUTO: 6.2 THOUSAND/UL (ref 3.8–10.8)

## 2020-06-19 PROCEDURE — 3044F HG A1C LEVEL LT 7.0%: CPT | Performed by: FAMILY MEDICINE

## 2020-06-25 ENCOUNTER — OFFICE VISIT (OUTPATIENT)
Dept: FAMILY MEDICINE CLINIC | Facility: CLINIC | Age: 65
End: 2020-06-25
Payer: COMMERCIAL

## 2020-06-25 VITALS
HEIGHT: 72 IN | TEMPERATURE: 99.3 F | HEART RATE: 69 BPM | OXYGEN SATURATION: 96 % | DIASTOLIC BLOOD PRESSURE: 68 MMHG | WEIGHT: 227 LBS | BODY MASS INDEX: 30.75 KG/M2 | SYSTOLIC BLOOD PRESSURE: 112 MMHG

## 2020-06-25 DIAGNOSIS — F41.9 ANXIETY: ICD-10-CM

## 2020-06-25 DIAGNOSIS — M10.9 GOUT, UNSPECIFIED CAUSE, UNSPECIFIED CHRONICITY, UNSPECIFIED SITE: ICD-10-CM

## 2020-06-25 DIAGNOSIS — G89.4 CHRONIC PAIN SYNDROME: ICD-10-CM

## 2020-06-25 DIAGNOSIS — I10 ESSENTIAL HYPERTENSION: ICD-10-CM

## 2020-06-25 DIAGNOSIS — E78.5 HYPERLIPIDEMIA, UNSPECIFIED HYPERLIPIDEMIA TYPE: ICD-10-CM

## 2020-06-25 DIAGNOSIS — L71.9 ROSACEA: Primary | ICD-10-CM

## 2020-06-25 DIAGNOSIS — J44.9 CHRONIC OBSTRUCTIVE PULMONARY DISEASE, UNSPECIFIED COPD TYPE (HCC): ICD-10-CM

## 2020-06-25 DIAGNOSIS — M51.36 DEGENERATION OF INTERVERTEBRAL DISC OF LUMBAR REGION: ICD-10-CM

## 2020-06-25 DIAGNOSIS — D68.59 HYPERCOAGULABLE STATE (HCC): ICD-10-CM

## 2020-06-25 DIAGNOSIS — E11.9 TYPE 2 DIABETES MELLITUS WITHOUT COMPLICATION, WITHOUT LONG-TERM CURRENT USE OF INSULIN (HCC): ICD-10-CM

## 2020-06-25 PROBLEM — E66.01 CLASS 2 SEVERE OBESITY WITH SERIOUS COMORBIDITY AND BODY MASS INDEX (BMI) OF 36.0 TO 36.9 IN ADULT (HCC): Status: RESOLVED | Noted: 2019-06-21 | Resolved: 2020-06-25

## 2020-06-25 PROBLEM — E11.42 TYPE 2 DIABETES MELLITUS WITH DIABETIC POLYNEUROPATHY, WITHOUT LONG-TERM CURRENT USE OF INSULIN (HCC): Status: RESOLVED | Noted: 2018-06-12 | Resolved: 2020-06-25

## 2020-06-25 PROBLEM — E66.812 CLASS 2 SEVERE OBESITY WITH SERIOUS COMORBIDITY AND BODY MASS INDEX (BMI) OF 36.0 TO 36.9 IN ADULT (HCC): Status: RESOLVED | Noted: 2019-06-21 | Resolved: 2020-06-25

## 2020-06-25 PROBLEM — M54.16 LUMBAR RADICULOPATHY: Status: ACTIVE | Noted: 2020-06-25

## 2020-06-25 PROBLEM — Z00.00 MEDICARE WELCOME EXAM: Status: RESOLVED | Noted: 2019-10-21 | Resolved: 2020-06-25

## 2020-06-25 PROCEDURE — 2022F DILAT RTA XM EVC RTNOPTHY: CPT | Performed by: FAMILY MEDICINE

## 2020-06-25 PROCEDURE — 1036F TOBACCO NON-USER: CPT | Performed by: FAMILY MEDICINE

## 2020-06-25 PROCEDURE — 4040F PNEUMOC VAC/ADMIN/RCVD: CPT | Performed by: FAMILY MEDICINE

## 2020-06-25 PROCEDURE — 3008F BODY MASS INDEX DOCD: CPT | Performed by: FAMILY MEDICINE

## 2020-06-25 PROCEDURE — 3074F SYST BP LT 130 MM HG: CPT | Performed by: FAMILY MEDICINE

## 2020-06-25 PROCEDURE — 1160F RVW MEDS BY RX/DR IN RCRD: CPT | Performed by: FAMILY MEDICINE

## 2020-06-25 PROCEDURE — 99214 OFFICE O/P EST MOD 30 MIN: CPT | Performed by: FAMILY MEDICINE

## 2020-06-25 PROCEDURE — 3078F DIAST BP <80 MM HG: CPT | Performed by: FAMILY MEDICINE

## 2020-06-25 RX ORDER — OXYCODONE HYDROCHLORIDE 5 MG/1
5 TABLET ORAL EVERY 6 HOURS PRN
Qty: 90 TABLET | Refills: 0 | Status: SHIPPED | OUTPATIENT
Start: 2020-06-25 | End: 2020-09-28 | Stop reason: SDUPTHER

## 2020-06-25 RX ORDER — PREGABALIN 150 MG/1
150 CAPSULE ORAL DAILY
Qty: 90 CAPSULE | Refills: 3 | Status: SHIPPED | OUTPATIENT
Start: 2020-06-25 | End: 2020-09-28 | Stop reason: SDUPTHER

## 2020-06-25 RX ORDER — METRONIDAZOLE 7.5 MG/G
GEL TOPICAL 2 TIMES DAILY
Qty: 45 G | Refills: 5 | Status: SHIPPED | OUTPATIENT
Start: 2020-06-25

## 2020-06-25 RX ORDER — DIAZEPAM 5 MG/1
5 TABLET ORAL EVERY 6 HOURS PRN
Qty: 90 TABLET | Refills: 5 | Status: SHIPPED | OUTPATIENT
Start: 2020-06-25 | End: 2020-09-28 | Stop reason: SDUPTHER

## 2020-07-08 DIAGNOSIS — E78.2 MIXED HYPERLIPIDEMIA: ICD-10-CM

## 2020-07-08 RX ORDER — FENOFIBRATE 134 MG/1
134 CAPSULE ORAL DAILY
Qty: 90 CAPSULE | Refills: 3 | Status: SHIPPED | OUTPATIENT
Start: 2020-07-08 | End: 2021-06-08

## 2020-07-10 DIAGNOSIS — I82.409 DEEP VEIN THROMBOSIS (DVT) OF LOWER EXTREMITY, UNSPECIFIED CHRONICITY, UNSPECIFIED LATERALITY, UNSPECIFIED VEIN (HCC): ICD-10-CM

## 2020-08-14 DIAGNOSIS — B37.0 THRUSH: ICD-10-CM

## 2020-09-28 DIAGNOSIS — G89.4 CHRONIC PAIN SYNDROME: ICD-10-CM

## 2020-09-28 DIAGNOSIS — F41.9 ANXIETY: ICD-10-CM

## 2020-09-28 RX ORDER — OXYCODONE HYDROCHLORIDE 5 MG/1
5 TABLET ORAL EVERY 6 HOURS PRN
Qty: 90 TABLET | Refills: 0 | Status: SHIPPED | OUTPATIENT
Start: 2020-09-28 | End: 2020-12-28 | Stop reason: SDUPTHER

## 2020-09-28 RX ORDER — PREGABALIN 150 MG/1
150 CAPSULE ORAL DAILY
Qty: 90 CAPSULE | Refills: 3 | Status: SHIPPED | OUTPATIENT
Start: 2020-09-28 | End: 2021-03-22 | Stop reason: SDUPTHER

## 2020-09-28 RX ORDER — DIAZEPAM 5 MG/1
5 TABLET ORAL EVERY 6 HOURS PRN
Qty: 90 TABLET | Refills: 5 | Status: SHIPPED | OUTPATIENT
Start: 2020-09-28 | End: 2020-12-31 | Stop reason: SDUPTHER

## 2020-09-28 NOTE — TELEPHONE ENCOUNTER
06/25/2020  2  06/25/2020  OXYCODONE HCL 5 MG TABLET  90 0  23  BR ANNIE  [id number]  PENNS ([id number])  0  29 35 MME  Medicare  [place]    06/25/2020  2  06/25/2020  PREGABALIN 150 MG CAPSULE  90 0  90  BR ANNIE  [id number]  PENNS (4779)  0   Medicare  [place]    06/25/2020  2  06/25/2020  DIAZEPAM 5 MG TABLET  90 0  23  BR ANNIE  [id number]  PENNS (4779)  0   Medicare  [place]    03/31/2020  1  03/31/2020  OXYCODONE HCL 5 MG TABLET  90 0  23  BR ANNIE  [id number]  PENNS ([id number])  0  29 35 MME  Private Pay  [place]    03/29/2020  1  12/26/2019  PREGABALIN 150 MG CAPSULE  90 0  90  BR ANNIE  [id number]  PENNS ([id number])  1   Medicare  [place]    03/28/2020  1  12/26/2019  DIAZEPAM 5 MG TABLET  90 0  23  BR ANNIE  [id number]  PENNS (4779)  1   Private Pay  [place]    12/26/2019  1  12/26/2019  OXYCODONE HCL 5 MG TABLET  90 0  23  BR ANNIE  [id number]  PENNS (4779)  0  29 35 MME  Medicare  [place]    12/26/2019  1  12/26/2019  PREGABALIN 150 MG CAPSULE  90 0  90  BR ANNIE  [id number]  PENNS ([id number])  0   Medicare  [place]

## 2020-11-17 ENCOUNTER — VBI (OUTPATIENT)
Dept: ADMINISTRATIVE | Facility: OTHER | Age: 65
End: 2020-11-17

## 2020-12-22 ENCOUNTER — VBI (OUTPATIENT)
Dept: ADMINISTRATIVE | Facility: OTHER | Age: 65
End: 2020-12-22

## 2020-12-23 LAB
ALBUMIN SERPL-MCNC: 4.3 G/DL (ref 3.6–5.1)
ALBUMIN/CREAT UR: 4 MCG/MG CREAT
ALBUMIN/GLOB SERPL: 2 (CALC) (ref 1–2.5)
ALP SERPL-CCNC: 56 U/L (ref 35–144)
ALT SERPL-CCNC: 12 U/L (ref 9–46)
APPEARANCE UR: CLEAR
AST SERPL-CCNC: 15 U/L (ref 10–35)
BACTERIA UR QL AUTO: ABNORMAL /HPF
BILIRUB SERPL-MCNC: 0.6 MG/DL (ref 0.2–1.2)
BILIRUB UR QL STRIP: NEGATIVE
BUN SERPL-MCNC: 26 MG/DL (ref 7–25)
BUN/CREAT SERPL: 22 (CALC) (ref 6–22)
CALCIUM SERPL-MCNC: 9.9 MG/DL (ref 8.6–10.3)
CAOX CRY #/AREA URNS HPF: ABNORMAL /HPF
CHLORIDE SERPL-SCNC: 107 MMOL/L (ref 98–110)
CHOLEST SERPL-MCNC: 110 MG/DL
CHOLEST/HDLC SERPL: 2.7 (CALC)
CO2 SERPL-SCNC: 29 MMOL/L (ref 20–32)
COLOR UR: YELLOW
CREAT SERPL-MCNC: 1.17 MG/DL (ref 0.7–1.25)
CREAT UR-MCNC: 133 MG/DL (ref 20–320)
GLOBULIN SER CALC-MCNC: 2.2 G/DL (CALC) (ref 1.9–3.7)
GLUCOSE SERPL-MCNC: 95 MG/DL (ref 65–99)
GLUCOSE UR QL STRIP: NEGATIVE
HBA1C MFR BLD: 5.6 % OF TOTAL HGB
HDLC SERPL-MCNC: 41 MG/DL
HGB UR QL STRIP: NEGATIVE
HYALINE CASTS #/AREA URNS LPF: ABNORMAL /LPF
KETONES UR QL STRIP: NEGATIVE
LDLC SERPL CALC-MCNC: 54 MG/DL (CALC)
LEUKOCYTE ESTERASE UR QL STRIP: ABNORMAL
MICROALBUMIN UR-MCNC: 0.5 MG/DL
NITRITE UR QL STRIP: NEGATIVE
NONHDLC SERPL-MCNC: 69 MG/DL (CALC)
PH UR STRIP: ABNORMAL [PH] (ref 5–8)
POTASSIUM SERPL-SCNC: 4.4 MMOL/L (ref 3.5–5.3)
PROT SERPL-MCNC: 6.5 G/DL (ref 6.1–8.1)
PROT UR QL STRIP: NEGATIVE
RBC #/AREA URNS HPF: ABNORMAL /HPF
SL AMB EGFR AFRICAN AMERICAN: 75 ML/MIN/1.73M2
SL AMB EGFR NON AFRICAN AMERICAN: 65 ML/MIN/1.73M2
SODIUM SERPL-SCNC: 143 MMOL/L (ref 135–146)
SP GR UR STRIP: 1.02 (ref 1–1.03)
SQUAMOUS #/AREA URNS HPF: ABNORMAL /HPF
TRIGL SERPL-MCNC: 70 MG/DL
URATE SERPL-MCNC: 4.7 MG/DL (ref 4–8)
WBC #/AREA URNS HPF: ABNORMAL /HPF

## 2020-12-23 PROCEDURE — 3044F HG A1C LEVEL LT 7.0%: CPT | Performed by: FAMILY MEDICINE

## 2020-12-23 PROCEDURE — 3061F NEG MICROALBUMINURIA REV: CPT | Performed by: FAMILY MEDICINE

## 2020-12-28 DIAGNOSIS — G89.4 CHRONIC PAIN SYNDROME: ICD-10-CM

## 2020-12-28 DIAGNOSIS — B37.0 THRUSH: ICD-10-CM

## 2020-12-28 DIAGNOSIS — E78.5 HYPERLIPIDEMIA, UNSPECIFIED HYPERLIPIDEMIA TYPE: ICD-10-CM

## 2020-12-28 RX ORDER — ATORVASTATIN CALCIUM 40 MG/1
TABLET, FILM COATED ORAL
Qty: 90 TABLET | Refills: 3 | Status: SHIPPED | OUTPATIENT
Start: 2020-12-28 | End: 2022-02-18

## 2020-12-28 RX ORDER — OXYCODONE HYDROCHLORIDE 5 MG/1
5 TABLET ORAL EVERY 6 HOURS PRN
Qty: 90 TABLET | Refills: 0 | Status: SHIPPED | OUTPATIENT
Start: 2020-12-28 | End: 2021-03-22 | Stop reason: SDUPTHER

## 2020-12-31 ENCOUNTER — OFFICE VISIT (OUTPATIENT)
Dept: FAMILY MEDICINE CLINIC | Facility: CLINIC | Age: 65
End: 2020-12-31
Payer: COMMERCIAL

## 2020-12-31 VITALS
SYSTOLIC BLOOD PRESSURE: 132 MMHG | WEIGHT: 241 LBS | DIASTOLIC BLOOD PRESSURE: 74 MMHG | BODY MASS INDEX: 32.64 KG/M2 | TEMPERATURE: 97.5 F | OXYGEN SATURATION: 96 % | HEART RATE: 55 BPM | HEIGHT: 72 IN

## 2020-12-31 DIAGNOSIS — M10.9 GOUT, UNSPECIFIED CAUSE, UNSPECIFIED CHRONICITY, UNSPECIFIED SITE: ICD-10-CM

## 2020-12-31 DIAGNOSIS — J44.9 CHRONIC OBSTRUCTIVE PULMONARY DISEASE, UNSPECIFIED COPD TYPE (HCC): ICD-10-CM

## 2020-12-31 DIAGNOSIS — I10 ESSENTIAL HYPERTENSION: ICD-10-CM

## 2020-12-31 DIAGNOSIS — M51.36 DEGENERATION OF INTERVERTEBRAL DISC OF LUMBAR REGION: ICD-10-CM

## 2020-12-31 DIAGNOSIS — Z12.5 PROSTATE CANCER SCREENING: ICD-10-CM

## 2020-12-31 DIAGNOSIS — R73.9 ELEVATED BLOOD SUGAR: ICD-10-CM

## 2020-12-31 DIAGNOSIS — N18.2 STAGE 2 CHRONIC KIDNEY DISEASE: ICD-10-CM

## 2020-12-31 DIAGNOSIS — E78.5 HYPERLIPIDEMIA, UNSPECIFIED HYPERLIPIDEMIA TYPE: ICD-10-CM

## 2020-12-31 DIAGNOSIS — F41.9 ANXIETY: ICD-10-CM

## 2020-12-31 DIAGNOSIS — Z00.00 MEDICARE ANNUAL WELLNESS VISIT, SUBSEQUENT: ICD-10-CM

## 2020-12-31 DIAGNOSIS — Z87.891 FORMER SMOKER: Primary | ICD-10-CM

## 2020-12-31 DIAGNOSIS — D68.59 HYPERCOAGULABLE STATE (HCC): ICD-10-CM

## 2020-12-31 PROCEDURE — 1101F PT FALLS ASSESS-DOCD LE1/YR: CPT | Performed by: FAMILY MEDICINE

## 2020-12-31 PROCEDURE — 3075F SYST BP GE 130 - 139MM HG: CPT | Performed by: FAMILY MEDICINE

## 2020-12-31 PROCEDURE — 3066F NEPHROPATHY DOC TX: CPT | Performed by: FAMILY MEDICINE

## 2020-12-31 PROCEDURE — 1160F RVW MEDS BY RX/DR IN RCRD: CPT | Performed by: FAMILY MEDICINE

## 2020-12-31 PROCEDURE — 3288F FALL RISK ASSESSMENT DOCD: CPT | Performed by: FAMILY MEDICINE

## 2020-12-31 PROCEDURE — 1036F TOBACCO NON-USER: CPT | Performed by: FAMILY MEDICINE

## 2020-12-31 PROCEDURE — 3725F SCREEN DEPRESSION PERFORMED: CPT | Performed by: FAMILY MEDICINE

## 2020-12-31 PROCEDURE — 1170F FXNL STATUS ASSESSED: CPT | Performed by: FAMILY MEDICINE

## 2020-12-31 PROCEDURE — 3008F BODY MASS INDEX DOCD: CPT | Performed by: FAMILY MEDICINE

## 2020-12-31 PROCEDURE — 3078F DIAST BP <80 MM HG: CPT | Performed by: FAMILY MEDICINE

## 2020-12-31 PROCEDURE — 99214 OFFICE O/P EST MOD 30 MIN: CPT | Performed by: FAMILY MEDICINE

## 2020-12-31 PROCEDURE — 1125F AMNT PAIN NOTED PAIN PRSNT: CPT | Performed by: FAMILY MEDICINE

## 2020-12-31 PROCEDURE — G0439 PPPS, SUBSEQ VISIT: HCPCS | Performed by: FAMILY MEDICINE

## 2020-12-31 RX ORDER — DIAZEPAM 5 MG/1
5 TABLET ORAL EVERY 6 HOURS PRN
Qty: 90 TABLET | Refills: 5 | Status: CANCELLED | OUTPATIENT
Start: 2020-12-31 | End: 2021-01-30

## 2020-12-31 RX ORDER — FLUTICASONE FUROATE, UMECLIDINIUM BROMIDE AND VILANTEROL TRIFENATATE 100; 62.5; 25 UG/1; UG/1; UG/1
1 POWDER RESPIRATORY (INHALATION) DAILY
COMMUNITY
Start: 2020-12-21

## 2020-12-31 RX ORDER — CHLORAL HYDRATE 500 MG
1000 CAPSULE ORAL DAILY
COMMUNITY

## 2020-12-31 RX ORDER — DIAZEPAM 5 MG/1
5 TABLET ORAL EVERY 6 HOURS PRN
Qty: 90 TABLET | Refills: 5 | Status: SHIPPED | OUTPATIENT
Start: 2020-12-31 | End: 2021-03-22 | Stop reason: SDUPTHER

## 2021-01-19 ENCOUNTER — HOSPITAL ENCOUNTER (OUTPATIENT)
Dept: ULTRASOUND IMAGING | Facility: HOSPITAL | Age: 66
Discharge: HOME | End: 2021-01-19
Attending: FAMILY MEDICINE
Payer: COMMERCIAL

## 2021-01-19 DIAGNOSIS — Z87.891 FORMER SMOKER: ICD-10-CM

## 2021-01-19 PROCEDURE — 76706 US ABDL AORTA SCREEN AAA: CPT

## 2021-02-13 DIAGNOSIS — Z23 ENCOUNTER FOR IMMUNIZATION: ICD-10-CM

## 2021-03-02 ENCOUNTER — IMMUNIZATIONS (OUTPATIENT)
Dept: FAMILY MEDICINE CLINIC | Facility: HOSPITAL | Age: 66
End: 2021-03-02

## 2021-03-02 DIAGNOSIS — Z23 ENCOUNTER FOR IMMUNIZATION: Primary | ICD-10-CM

## 2021-03-02 PROCEDURE — 91300 SARS-COV-2 / COVID-19 MRNA VACCINE (PFIZER-BIONTECH) 30 MCG: CPT

## 2021-03-02 PROCEDURE — 0001A SARS-COV-2 / COVID-19 MRNA VACCINE (PFIZER-BIONTECH) 30 MCG: CPT

## 2021-03-08 DIAGNOSIS — I10 ESSENTIAL HYPERTENSION: ICD-10-CM

## 2021-03-08 RX ORDER — METOPROLOL SUCCINATE 50 MG/1
50 TABLET, EXTENDED RELEASE ORAL DAILY
Qty: 90 TABLET | Refills: 3 | Status: SHIPPED | OUTPATIENT
Start: 2021-03-08 | End: 2022-02-18

## 2021-03-09 DIAGNOSIS — E79.0 HYPERURICEMIA: ICD-10-CM

## 2021-03-09 RX ORDER — ALLOPURINOL 100 MG/1
TABLET ORAL
Qty: 90 TABLET | Refills: 5 | Status: SHIPPED | OUTPATIENT
Start: 2021-03-09 | End: 2022-04-07

## 2021-03-22 DIAGNOSIS — F41.9 ANXIETY: ICD-10-CM

## 2021-03-22 DIAGNOSIS — G89.4 CHRONIC PAIN SYNDROME: ICD-10-CM

## 2021-03-22 RX ORDER — OXYCODONE HYDROCHLORIDE 5 MG/1
5 TABLET ORAL EVERY 6 HOURS PRN
Qty: 90 TABLET | Refills: 0 | Status: SHIPPED | OUTPATIENT
Start: 2021-03-22 | End: 2021-06-18 | Stop reason: SDUPTHER

## 2021-03-22 RX ORDER — PREGABALIN 150 MG/1
150 CAPSULE ORAL DAILY
Qty: 90 CAPSULE | Refills: 3 | Status: SHIPPED | OUTPATIENT
Start: 2021-03-22 | End: 2021-06-08

## 2021-03-22 RX ORDER — DIAZEPAM 5 MG/1
5 TABLET ORAL EVERY 6 HOURS PRN
Qty: 90 TABLET | Refills: 5 | Status: SHIPPED | OUTPATIENT
Start: 2021-03-22 | End: 2021-11-22

## 2021-03-23 ENCOUNTER — IMMUNIZATIONS (OUTPATIENT)
Dept: FAMILY MEDICINE CLINIC | Facility: HOSPITAL | Age: 66
End: 2021-03-23

## 2021-03-23 DIAGNOSIS — Z23 ENCOUNTER FOR IMMUNIZATION: Primary | ICD-10-CM

## 2021-03-23 PROCEDURE — 0002A SARS-COV-2 / COVID-19 MRNA VACCINE (PFIZER-BIONTECH) 30 MCG: CPT

## 2021-03-23 PROCEDURE — 91300 SARS-COV-2 / COVID-19 MRNA VACCINE (PFIZER-BIONTECH) 30 MCG: CPT

## 2021-06-08 DIAGNOSIS — E78.2 MIXED HYPERLIPIDEMIA: ICD-10-CM

## 2021-06-08 DIAGNOSIS — G89.4 CHRONIC PAIN SYNDROME: ICD-10-CM

## 2021-06-08 DIAGNOSIS — I82.409 DEEP VEIN THROMBOSIS (DVT) OF LOWER EXTREMITY, UNSPECIFIED CHRONICITY, UNSPECIFIED LATERALITY, UNSPECIFIED VEIN (HCC): ICD-10-CM

## 2021-06-08 RX ORDER — PREGABALIN 150 MG/1
CAPSULE ORAL
Qty: 90 CAPSULE | Refills: 3 | Status: SHIPPED | OUTPATIENT
Start: 2021-06-08 | End: 2021-07-12 | Stop reason: SDUPTHER

## 2021-06-08 RX ORDER — RIVAROXABAN 20 MG/1
TABLET, FILM COATED ORAL
Qty: 90 TABLET | Refills: 3 | Status: SHIPPED | OUTPATIENT
Start: 2021-06-08 | End: 2021-06-22

## 2021-06-08 RX ORDER — FENOFIBRATE 134 MG/1
CAPSULE ORAL
Qty: 90 CAPSULE | Refills: 3 | Status: SHIPPED | OUTPATIENT
Start: 2021-06-08 | End: 2022-01-11

## 2021-06-08 NOTE — TELEPHONE ENCOUNTER
Medication:  PDMP   05/20/2021  1  03/22/2021  PREGABALIN 150 MG CAPSULE  90 0  90  BR ANNIE  [id number]  MT  B (4057)  0   Comm Ins  [place]    04/10/2021  1  04/10/2021  ACETAMINOPHEN-COD #3 TABLET  16 0  2  TH HEL  [id number]  PENNS (4779)  0  36  0 MME  Medicare  [place]    03/27/2021  1  03/22/2021  DIAZEPAM 5 MG TABLET  90 0  23  BR ANNIE  [id number]  MT  B (4057)  0   Comm Ins  [place]    03/22/2021  1  03/22/2021  OXYCODONE HCL 5 MG TABLET  90 0  22  BR ANNIE  [id number]  MT  B (4057)  0  30 68 MME  Comm Ins  [place]        Active agreement on file -No

## 2021-06-18 DIAGNOSIS — G89.4 CHRONIC PAIN SYNDROME: ICD-10-CM

## 2021-06-18 RX ORDER — OXYCODONE HYDROCHLORIDE 5 MG/1
5 TABLET ORAL EVERY 6 HOURS PRN
Qty: 90 TABLET | Refills: 0 | Status: SHIPPED | OUTPATIENT
Start: 2021-06-18 | End: 2021-07-12 | Stop reason: SDUPTHER

## 2021-06-18 NOTE — TELEPHONE ENCOUNTER
05/20/2021 1 03/22/2021   PREGABALIN 150 MG CAPSULE  90 0 90 BR ANNIE   [id number]  MT  B (4057) 0  Comm Ins [place]  04/10/2021 1 04/10/2021   ACETAMINOPHEN-COD #3 TABLET  16 0 2 TH HEL   [id number]  PENNS ([id number]) 0 36  0 MME Medicare [place]  03/27/2021 1 03/22/2021   DIAZEPAM 5 MG TABLET  90 0 23 BR ANNIE   [id number]  MT  B (4057) 0  Comm Ins [place]  03/22/2021 1 03/22/2021   OXYCODONE HCL 5 MG TABLET  90 0 22 BR ANNIE   [id number]  MT  B (4057) 0 30 68 MME Comm Ins [place]  03/09/2021 1 09/28/2020   PREGABALIN 150 MG CAPSULE  90 0 90 BR ANNIE   [id number]  MT  B (4057) 0  Comm Ins [place]  03/09/2021 1 12/31/2020   DIAZEPAM 5 MG TABLET  90 0 30 BR ANNIE   [id number]  MT  B (4057) 0  Comm Ins [place]  12/31/2020 1 12/31/2020   DIAZEPAM 5 MG TABLET  90 0 23 BR ANNIE   [id number]  PENNS (7479) 0  Medicare [place]  12/28/2020 1 12/28/2020   OXYCODONE HCL 5 MG TABLET  90 0 23 BR ANNIE   [id number]  PENNS ([id number]) 0 29 35 MME Medicare P

## 2021-06-22 DIAGNOSIS — I82.409 DEEP VEIN THROMBOSIS (DVT) OF LOWER EXTREMITY, UNSPECIFIED CHRONICITY, UNSPECIFIED LATERALITY, UNSPECIFIED VEIN (HCC): ICD-10-CM

## 2021-06-22 RX ORDER — RIVAROXABAN 20 MG/1
TABLET, FILM COATED ORAL
Qty: 90 TABLET | Refills: 3 | Status: SHIPPED | OUTPATIENT
Start: 2021-06-22 | End: 2022-05-22

## 2021-06-22 NOTE — TELEPHONE ENCOUNTER
Requested medication(s) are due for refill today: Yes  Patient has already received a courtesy refill: No  Other reason request has been forwarded to provider: lab requirements not met

## 2021-07-07 LAB
ALBUMIN SERPL-MCNC: 4.2 G/DL (ref 3.6–5.1)
ALBUMIN/CREAT UR: 3 MCG/MG CREAT
ALBUMIN/GLOB SERPL: 1.9 (CALC) (ref 1–2.5)
ALP SERPL-CCNC: 64 U/L (ref 35–144)
ALT SERPL-CCNC: 12 U/L (ref 9–46)
APPEARANCE UR: CLEAR
AST SERPL-CCNC: 16 U/L (ref 10–35)
BACTERIA UR QL AUTO: ABNORMAL /HPF
BASOPHILS # BLD AUTO: 62 CELLS/UL (ref 0–200)
BASOPHILS NFR BLD AUTO: 0.8 %
BILIRUB SERPL-MCNC: 0.6 MG/DL (ref 0.2–1.2)
BILIRUB UR QL STRIP: NEGATIVE
BUN SERPL-MCNC: 23 MG/DL (ref 7–25)
BUN/CREAT SERPL: ABNORMAL (CALC) (ref 6–22)
CALCIUM SERPL-MCNC: 10.2 MG/DL (ref 8.6–10.3)
CHLORIDE SERPL-SCNC: 106 MMOL/L (ref 98–110)
CHOLEST SERPL-MCNC: 107 MG/DL
CHOLEST/HDLC SERPL: 2.6 (CALC)
CO2 SERPL-SCNC: 28 MMOL/L (ref 20–32)
COLOR UR: YELLOW
CREAT SERPL-MCNC: 1.19 MG/DL (ref 0.7–1.25)
CREAT UR-MCNC: 131 MG/DL (ref 20–320)
EOSINOPHIL # BLD AUTO: 162 CELLS/UL (ref 15–500)
EOSINOPHIL NFR BLD AUTO: 2.1 %
ERYTHROCYTE [DISTWIDTH] IN BLOOD BY AUTOMATED COUNT: 13.3 % (ref 11–15)
GLOBULIN SER CALC-MCNC: 2.2 G/DL (CALC) (ref 1.9–3.7)
GLUCOSE SERPL-MCNC: 102 MG/DL (ref 65–99)
GLUCOSE UR QL STRIP: NEGATIVE
HBA1C MFR BLD: 5.7 % OF TOTAL HGB
HCT VFR BLD AUTO: 44.6 % (ref 38.5–50)
HDLC SERPL-MCNC: 41 MG/DL
HGB BLD-MCNC: 14.3 G/DL (ref 13.2–17.1)
HGB UR QL STRIP: NEGATIVE
HYALINE CASTS #/AREA URNS LPF: ABNORMAL /LPF
KETONES UR QL STRIP: NEGATIVE
LDLC SERPL CALC-MCNC: 50 MG/DL (CALC)
LEUKOCYTE ESTERASE UR QL STRIP: ABNORMAL
LYMPHOCYTES # BLD AUTO: 2926 CELLS/UL (ref 850–3900)
LYMPHOCYTES NFR BLD AUTO: 38 %
MCH RBC QN AUTO: 29.9 PG (ref 27–33)
MCHC RBC AUTO-ENTMCNC: 32.1 G/DL (ref 32–36)
MCV RBC AUTO: 93.1 FL (ref 80–100)
MICROALBUMIN UR-MCNC: 0.4 MG/DL
MONOCYTES # BLD AUTO: 701 CELLS/UL (ref 200–950)
MONOCYTES NFR BLD AUTO: 9.1 %
NEUTROPHILS # BLD AUTO: 3850 CELLS/UL (ref 1500–7800)
NEUTROPHILS NFR BLD AUTO: 50 %
NITRITE UR QL STRIP: NEGATIVE
NONHDLC SERPL-MCNC: 66 MG/DL (CALC)
PH UR STRIP: 5.5 [PH] (ref 5–8)
PLATELET # BLD AUTO: 239 THOUSAND/UL (ref 140–400)
PMV BLD REES-ECKER: 9.4 FL (ref 7.5–12.5)
POTASSIUM SERPL-SCNC: 4.2 MMOL/L (ref 3.5–5.3)
PROT SERPL-MCNC: 6.4 G/DL (ref 6.1–8.1)
PROT UR QL STRIP: NEGATIVE
PSA SERPL-MCNC: 3 NG/ML
RBC # BLD AUTO: 4.79 MILLION/UL (ref 4.2–5.8)
RBC #/AREA URNS HPF: ABNORMAL /HPF
SL AMB EGFR AFRICAN AMERICAN: 73 ML/MIN/1.73M2
SL AMB EGFR NON AFRICAN AMERICAN: 63 ML/MIN/1.73M2
SODIUM SERPL-SCNC: 140 MMOL/L (ref 135–146)
SP GR UR STRIP: 1.02 (ref 1–1.03)
SQUAMOUS #/AREA URNS HPF: ABNORMAL /HPF
TRIGL SERPL-MCNC: 84 MG/DL
URATE SERPL-MCNC: 5.2 MG/DL (ref 4–8)
WBC # BLD AUTO: 7.7 THOUSAND/UL (ref 3.8–10.8)
WBC #/AREA URNS HPF: ABNORMAL /HPF

## 2021-07-07 PROCEDURE — 3061F NEG MICROALBUMINURIA REV: CPT | Performed by: FAMILY MEDICINE

## 2021-07-07 PROCEDURE — 3044F HG A1C LEVEL LT 7.0%: CPT | Performed by: FAMILY MEDICINE

## 2021-07-12 ENCOUNTER — OFFICE VISIT (OUTPATIENT)
Dept: FAMILY MEDICINE CLINIC | Facility: CLINIC | Age: 66
End: 2021-07-12
Payer: COMMERCIAL

## 2021-07-12 VITALS
OXYGEN SATURATION: 96 % | SYSTOLIC BLOOD PRESSURE: 130 MMHG | WEIGHT: 249.8 LBS | HEIGHT: 72 IN | BODY MASS INDEX: 33.83 KG/M2 | TEMPERATURE: 97.9 F | HEART RATE: 66 BPM | DIASTOLIC BLOOD PRESSURE: 72 MMHG

## 2021-07-12 DIAGNOSIS — E78.5 HYPERLIPIDEMIA, UNSPECIFIED HYPERLIPIDEMIA TYPE: ICD-10-CM

## 2021-07-12 DIAGNOSIS — M10.9 GOUT, UNSPECIFIED CAUSE, UNSPECIFIED CHRONICITY, UNSPECIFIED SITE: ICD-10-CM

## 2021-07-12 DIAGNOSIS — D68.59 HYPERCOAGULABLE STATE (HCC): ICD-10-CM

## 2021-07-12 DIAGNOSIS — L20.9 ATOPIC DERMATITIS, UNSPECIFIED TYPE: ICD-10-CM

## 2021-07-12 DIAGNOSIS — I10 ESSENTIAL HYPERTENSION: ICD-10-CM

## 2021-07-12 DIAGNOSIS — R73.03 PREDIABETES: ICD-10-CM

## 2021-07-12 DIAGNOSIS — G89.4 CHRONIC PAIN SYNDROME: ICD-10-CM

## 2021-07-12 DIAGNOSIS — M51.36 DEGENERATION OF INTERVERTEBRAL DISC OF LUMBAR REGION: ICD-10-CM

## 2021-07-12 DIAGNOSIS — J44.9 CHRONIC OBSTRUCTIVE PULMONARY DISEASE, UNSPECIFIED COPD TYPE (HCC): Primary | ICD-10-CM

## 2021-07-12 PROCEDURE — 1036F TOBACCO NON-USER: CPT | Performed by: FAMILY MEDICINE

## 2021-07-12 PROCEDURE — 3008F BODY MASS INDEX DOCD: CPT | Performed by: FAMILY MEDICINE

## 2021-07-12 PROCEDURE — 99214 OFFICE O/P EST MOD 30 MIN: CPT | Performed by: FAMILY MEDICINE

## 2021-07-12 PROCEDURE — 3725F SCREEN DEPRESSION PERFORMED: CPT | Performed by: FAMILY MEDICINE

## 2021-07-12 PROCEDURE — 1160F RVW MEDS BY RX/DR IN RCRD: CPT | Performed by: FAMILY MEDICINE

## 2021-07-12 PROCEDURE — 3078F DIAST BP <80 MM HG: CPT | Performed by: FAMILY MEDICINE

## 2021-07-12 PROCEDURE — 3075F SYST BP GE 130 - 139MM HG: CPT | Performed by: FAMILY MEDICINE

## 2021-07-12 RX ORDER — LEVALBUTEROL TARTRATE 45 UG/1
2 AEROSOL, METERED ORAL EVERY 6 HOURS PRN
COMMUNITY
Start: 2021-01-21 | End: 2022-01-21

## 2021-07-12 RX ORDER — TRIAMCINOLONE ACETONIDE 5 MG/G
CREAM TOPICAL 3 TIMES DAILY
Qty: 30 G | Refills: 0 | Status: SHIPPED | OUTPATIENT
Start: 2021-07-12

## 2021-07-12 RX ORDER — PREGABALIN 150 MG/1
150 CAPSULE ORAL 2 TIMES DAILY
Qty: 180 CAPSULE | Refills: 3 | Status: SHIPPED | OUTPATIENT
Start: 2021-07-12 | End: 2022-02-07

## 2021-07-12 RX ORDER — OXYCODONE HYDROCHLORIDE 5 MG/1
5 TABLET ORAL EVERY 6 HOURS PRN
Qty: 180 TABLET | Refills: 0 | Status: SHIPPED | OUTPATIENT
Start: 2021-07-12 | End: 2021-08-17

## 2021-07-12 NOTE — PROGRESS NOTES
BMI Counseling: Body mass index is 33 88 kg/m²  The BMI is above normal  Nutrition recommendations include decreasing portion sizes and moderation in carbohydrate intake  Exercise recommendations include exercising 3-5 times per week  No pharmacotherapy was ordered  Assessment/Plan:    Return visit in 6 months with fasting blood work prior to visit       Problem List Items Addressed This Visit        Respiratory    Chronic obstructive pulmonary disease ([facility]D) ([facility] ) - Primary      Continue Proventil as needed         Relevant Medications    levalbuterol (XOPENEX HFA) 45 mcg/act inhaler       Cardiovascular and Mediastinum    Essential hypertension       Continue Toprol-XL 50 mg daily            Musculoskeletal and Integument    Degeneration of intervertebral disc of lumbar region       Other    Gout      Continue allopurinol 100 mg daily         Hypercoagulable state (HCC)      Continue Xarelto 20 mg daily         Hyperlipidemia      Continue Lipitor 40 mg daily         Relevant Orders    Lipid panel    Comprehensive metabolic panel    Prediabetes      Low carb diet         Relevant Orders    Hemoglobin A1C      Other Visit Diagnoses     Chronic pain syndrome        Relevant Medications    pregabalin (LYRICA) 150 mg capsule    oxyCODONE (ROXICODONE) 5 mg immediate release tablet    Atopic dermatitis, unspecified type        Relevant Medications    triamcinolone (KENALOG) 0 5 % cream            Subjective:      Patient ID: Nayana Her is a 78 y o  male  Patient comes in for a checkup  He is planning to have resided me for his low back pain  Is complaining about pain down his legs  He also has dry cracked skin of his hands        The following portions of the patient's history were reviewed and updated as appropriate:   Past Medical History:  He has a past medical history of Colon polyp, COPD (chronic obstructive pulmonary disease) ([facility] ), H/O colonoscopy (08/22/2011), Hyperlipidemia, Hypertension, Shortness of breath, Sleep apnea, and Synovitis and tenosynovitis  ,  _______________________________________________________________________  Medical Problems:  does not have any pertinent problems on file ,  _______________________________________________________________________  Past Surgical History:   has a past surgical history that includes Knee surgery (Right); Lumbar laminectomy; Hernia repair; Back surgery; and Colonoscopy  ,  _______________________________________________________________________  Family History:  family history includes Alzheimer's disease in his mother; Lung cancer in his father; Multiple sclerosis in his father ,  _______________________________________________________________________  Social History:   reports that he has quit smoking  He has never used smokeless tobacco  He reports that he does not drink alcohol and does not use drugs  ,  _______________________________________________________________________  Allergies:  is allergic to penicillins     _______________________________________________________________________  Current Outpatient Medications   Medication Sig Dispense Refill    albuterol (PROAIR HFA) 90 mcg/act inhaler Inhale 2 puffs every 4 (four) hours as needed      allopurinol (ZYLOPRIM) 100 mg tablet TAKE ONE TABLET BY MOUTH DAILY 90 tablet 5    atorvastatin (LIPITOR) 40 mg tablet TAKE 1 TABLET BY MOUTH EVERY DAY 90 tablet 3    diazepam (VALIUM) 5 mg tablet Take 1 tablet (5 mg total) by mouth every 6 (six) hours as needed for anxiety 90 tablet 5    fenofibrate micronized (LOFIBRA) 134 MG capsule TAKE ONE CAPSULE BY MOUTH DAILY 90 capsule 3    metoprolol succinate (TOPROL-XL) 50 mg 24 hr tablet Take 1 tablet (50 mg total) by mouth daily 90 tablet 3    metroNIDAZOLE (METROGEL) 0 75 % gel Apply topically 2 (two) times a day 45 g 5    Multiple Vitamin (DAILY VALUE MULTIVITAMIN) TABS Take 1 tablet by mouth daily      Omega-3 Fatty Acids (fish oil) 1,000 mg Take 1,000 mg by mouth daily      oxyCODONE (ROXICODONE) 5 mg immediate release tablet Take 1 tablet (5 mg total) by mouth every 6 (six) hours as needed for moderate painMax Daily Amount: 20 mg 180 tablet 0    pregabalin (LYRICA) 150 mg capsule Take 1 capsule (150 mg total) by mouth 2 (two) times a day 180 capsule 3    tadalafil (CIALIS) 20 MG tablet Take 1 tablet (20 mg total) by mouth daily as needed for erectile dysfunction 100 tablet 5    Trelegy Ellipta 100-62 5-25 MCG/INH inhaler Inhale 1 puff daily      Xarelto 20 MG tablet TAKE ONE TABLET BY MOUTH DAILY 90 tablet 3    levalbuterol (XOPENEX HFA) 45 mcg/act inhaler Inhale 2 puffs every 6 (six) hours as needed (Patient not taking: Reported on 7/12/2021)      nystatin (MYCOSTATIN) 500,000 units/5 mL suspension APPLY 5 ML TO THE MOUTH OR THROAT 4 TIMES A DAY FOR 7 DAYS (Patient not taking: Reported on 12/31/2020) 473 mL 0    triamcinolone (KENALOG) 0 5 % cream Apply topically 3 (three) times a day 30 g 0     No current facility-administered medications for this visit      _______________________________________________________________________  Review of Systems   Constitutional: Negative  Respiratory: Negative  Cardiovascular: Negative  Musculoskeletal: Positive for back pain and gait problem  Objective:  Vitals:    07/12/21 1049   BP: 130/72   BP Location: Left arm   Patient Position: Sitting   Cuff Size: Large   Pulse: 66   Temp: 97 9 °F (36 6 °C)   TempSrc: Tympanic   SpO2: 96%   Weight: 113 kg (249 lb 12 8 oz)   Height: 6' (1 829 m)     Body mass index is 33 88 kg/m²  Physical Exam  Constitutional:       Appearance: He is well-developed  He is obese  HENT:      Head: Normocephalic and atraumatic  Eyes:      Pupils: Pupils are equal, round, and reactive to light  Cardiovascular:      Rate and Rhythm: Normal rate and regular rhythm  Heart sounds: Normal heart sounds     Pulmonary:      Effort: Pulmonary effort is normal       Breath sounds: Normal breath sounds  Abdominal:      General: Bowel sounds are normal       Palpations: Abdomen is soft  Musculoskeletal:      Cervical back: Neck supple  Comments: Antalgic gait   Skin:     Comments: Dry cracked skin of hands   Neurological:      Mental Status: He is alert and oriented to person, place, and time  Psychiatric:         Thought Content:  Thought content normal

## 2021-08-16 DIAGNOSIS — G89.4 CHRONIC PAIN SYNDROME: ICD-10-CM

## 2021-08-17 RX ORDER — OXYCODONE HYDROCHLORIDE 5 MG/1
5 TABLET ORAL EVERY 6 HOURS PRN
Qty: 180 TABLET | Refills: 0 | Status: SHIPPED | OUTPATIENT
Start: 2021-08-17 | End: 2022-02-07

## 2022-01-27 ENCOUNTER — TELEPHONE (OUTPATIENT)
Dept: FAMILY MEDICINE CLINIC | Facility: CLINIC | Age: 67
End: 2022-01-27

## 2022-01-27 NOTE — TELEPHONE ENCOUNTER
Walked in to drop off paperwork that needs to filled out and signed by [employer] Duty Excuse Letter and Forms  Paperwork in [employer] folder  Call when completed for  [phone]

## 2022-02-07 DIAGNOSIS — G89.4 CHRONIC PAIN SYNDROME: ICD-10-CM

## 2022-02-07 DIAGNOSIS — F41.9 ANXIETY: ICD-10-CM

## 2022-02-07 LAB
ALBUMIN SERPL-MCNC: 4.3 G/DL (ref 3.6–5.1)
ALBUMIN/GLOB SERPL: 1.8 (CALC) (ref 1–2.5)
ALP SERPL-CCNC: 61 U/L (ref 35–144)
ALT SERPL-CCNC: 25 U/L (ref 9–46)
AST SERPL-CCNC: 22 U/L (ref 10–35)
BILIRUB SERPL-MCNC: 0.6 MG/DL (ref 0.2–1.2)
BUN SERPL-MCNC: 24 MG/DL (ref 7–25)
BUN/CREAT SERPL: 17 (CALC) (ref 6–22)
CALCIUM SERPL-MCNC: 10 MG/DL (ref 8.6–10.3)
CHLORIDE SERPL-SCNC: 105 MMOL/L (ref 98–110)
CHOLEST SERPL-MCNC: 119 MG/DL
CHOLEST/HDLC SERPL: 2.6 (CALC)
CO2 SERPL-SCNC: 30 MMOL/L (ref 20–32)
CREAT SERPL-MCNC: 1.38 MG/DL (ref 0.7–1.25)
GLOBULIN SER CALC-MCNC: 2.4 G/DL (CALC) (ref 1.9–3.7)
GLUCOSE SERPL-MCNC: 119 MG/DL (ref 65–99)
HBA1C MFR BLD: 6.7 % OF TOTAL HGB
HDLC SERPL-MCNC: 45 MG/DL
LDLC SERPL CALC-MCNC: 54 MG/DL (CALC)
NONHDLC SERPL-MCNC: 74 MG/DL (CALC)
POTASSIUM SERPL-SCNC: 4.5 MMOL/L (ref 3.5–5.3)
PROT SERPL-MCNC: 6.7 G/DL (ref 6.1–8.1)
SL AMB EGFR AFRICAN AMERICAN: 61 ML/MIN/1.73M2
SL AMB EGFR NON AFRICAN AMERICAN: 53 ML/MIN/1.73M2
SODIUM SERPL-SCNC: 142 MMOL/L (ref 135–146)
TRIGL SERPL-MCNC: 114 MG/DL

## 2022-02-07 PROCEDURE — 3044F HG A1C LEVEL LT 7.0%: CPT | Performed by: FAMILY MEDICINE

## 2022-02-07 RX ORDER — DIAZEPAM 5 MG/1
5 TABLET ORAL EVERY 6 HOURS PRN
Qty: 90 TABLET | Refills: 3 | Status: SHIPPED | OUTPATIENT
Start: 2022-02-07 | End: 2022-02-14 | Stop reason: SDUPTHER

## 2022-02-07 RX ORDER — PREGABALIN 150 MG/1
150 CAPSULE ORAL 2 TIMES DAILY
Qty: 180 CAPSULE | Refills: 5 | Status: SHIPPED | OUTPATIENT
Start: 2022-02-07 | End: 2022-02-19

## 2022-02-07 RX ORDER — OXYCODONE HYDROCHLORIDE 5 MG/1
5 TABLET ORAL EVERY 6 HOURS PRN
Qty: 180 TABLET | Refills: 0 | Status: SHIPPED | OUTPATIENT
Start: 2022-02-07 | End: 2022-02-14 | Stop reason: SDUPTHER

## 2022-02-08 ENCOUNTER — RA CDI HCC (OUTPATIENT)
Dept: OTHER | Facility: HOSPITAL | Age: 67
End: 2022-02-08

## 2022-02-08 NOTE — PROGRESS NOTES
[facility]  coding opportunities       Chart reviewed, no opportunity found: CHART REVIEWED, NO OPPORTUNITY FOUND                        Patients insurance company: [facility] [address]  (Medicare Advantage and Commercial)

## 2022-02-14 ENCOUNTER — OFFICE VISIT (OUTPATIENT)
Dept: FAMILY MEDICINE CLINIC | Facility: CLINIC | Age: 67
End: 2022-02-14
Payer: COMMERCIAL

## 2022-02-14 VITALS
OXYGEN SATURATION: 96 % | WEIGHT: 269.8 LBS | BODY MASS INDEX: 36.54 KG/M2 | HEIGHT: 72 IN | HEART RATE: 64 BPM | DIASTOLIC BLOOD PRESSURE: 74 MMHG | SYSTOLIC BLOOD PRESSURE: 128 MMHG

## 2022-02-14 DIAGNOSIS — D68.59 HYPERCOAGULABLE STATE (HCC): ICD-10-CM

## 2022-02-14 DIAGNOSIS — R73.03 PREDIABETES: ICD-10-CM

## 2022-02-14 DIAGNOSIS — L20.9 ATOPIC DERMATITIS, UNSPECIFIED TYPE: ICD-10-CM

## 2022-02-14 DIAGNOSIS — G89.4 CHRONIC PAIN SYNDROME: ICD-10-CM

## 2022-02-14 DIAGNOSIS — I10 ESSENTIAL HYPERTENSION: ICD-10-CM

## 2022-02-14 DIAGNOSIS — Z12.5 PROSTATE CANCER SCREENING: ICD-10-CM

## 2022-02-14 DIAGNOSIS — Z00.00 MEDICARE ANNUAL WELLNESS VISIT, SUBSEQUENT: Primary | ICD-10-CM

## 2022-02-14 DIAGNOSIS — E66.01 CLASS 2 SEVERE OBESITY DUE TO EXCESS CALORIES WITH SERIOUS COMORBIDITY AND BODY MASS INDEX (BMI) OF 36.0 TO 36.9 IN ADULT (HCC): ICD-10-CM

## 2022-02-14 DIAGNOSIS — M51.36 DEGENERATION OF INTERVERTEBRAL DISC OF LUMBAR REGION: ICD-10-CM

## 2022-02-14 DIAGNOSIS — F11.20 CONTINUOUS OPIOID DEPENDENCE (HCC): ICD-10-CM

## 2022-02-14 DIAGNOSIS — J44.9 CHRONIC OBSTRUCTIVE PULMONARY DISEASE, UNSPECIFIED COPD TYPE (HCC): ICD-10-CM

## 2022-02-14 DIAGNOSIS — G62.9 NEUROPATHY: ICD-10-CM

## 2022-02-14 DIAGNOSIS — E78.5 HYPERLIPIDEMIA, UNSPECIFIED HYPERLIPIDEMIA TYPE: ICD-10-CM

## 2022-02-14 DIAGNOSIS — M10.9 GOUT, UNSPECIFIED CAUSE, UNSPECIFIED CHRONICITY, UNSPECIFIED SITE: ICD-10-CM

## 2022-02-14 PROBLEM — F41.9 ANXIETY: Status: RESOLVED | Noted: 2020-12-31 | Resolved: 2022-02-14

## 2022-02-14 PROBLEM — M54.50 LOW BACK PAIN: Status: RESOLVED | Noted: 2019-02-18 | Resolved: 2022-02-14

## 2022-02-14 PROCEDURE — 1170F FXNL STATUS ASSESSED: CPT | Performed by: FAMILY MEDICINE

## 2022-02-14 PROCEDURE — 3288F FALL RISK ASSESSMENT DOCD: CPT | Performed by: FAMILY MEDICINE

## 2022-02-14 PROCEDURE — 1160F RVW MEDS BY RX/DR IN RCRD: CPT | Performed by: FAMILY MEDICINE

## 2022-02-14 PROCEDURE — 3078F DIAST BP <80 MM HG: CPT | Performed by: FAMILY MEDICINE

## 2022-02-14 PROCEDURE — 3008F BODY MASS INDEX DOCD: CPT | Performed by: FAMILY MEDICINE

## 2022-02-14 PROCEDURE — 1125F AMNT PAIN NOTED PAIN PRSNT: CPT | Performed by: FAMILY MEDICINE

## 2022-02-14 PROCEDURE — 1036F TOBACCO NON-USER: CPT | Performed by: FAMILY MEDICINE

## 2022-02-14 PROCEDURE — G0439 PPPS, SUBSEQ VISIT: HCPCS | Performed by: FAMILY MEDICINE

## 2022-02-14 PROCEDURE — 99214 OFFICE O/P EST MOD 30 MIN: CPT | Performed by: FAMILY MEDICINE

## 2022-02-14 PROCEDURE — 1101F PT FALLS ASSESS-DOCD LE1/YR: CPT | Performed by: FAMILY MEDICINE

## 2022-02-14 PROCEDURE — 1003F LEVEL OF ACTIVITY ASSESS: CPT | Performed by: FAMILY MEDICINE

## 2022-02-14 PROCEDURE — 3725F SCREEN DEPRESSION PERFORMED: CPT | Performed by: FAMILY MEDICINE

## 2022-02-14 PROCEDURE — 3074F SYST BP LT 130 MM HG: CPT | Performed by: FAMILY MEDICINE

## 2022-02-14 RX ORDER — DIAZEPAM 5 MG/1
5 TABLET ORAL 2 TIMES DAILY
Qty: 180 TABLET | Refills: 1 | Status: SHIPPED | OUTPATIENT
Start: 2022-02-14 | End: 2022-08-13

## 2022-02-14 RX ORDER — OXYCODONE HYDROCHLORIDE 5 MG/1
TABLET ORAL
Qty: 180 TABLET | Refills: 0 | Status: SHIPPED | OUTPATIENT
Start: 2022-02-14 | End: 2022-06-01 | Stop reason: SDUPTHER

## 2022-02-14 NOTE — PROGRESS NOTES
Assessment and Plan:     Problem List Items Addressed This Visit     None      Visit Diagnoses     Medicare annual wellness visit, subsequent    -  Primary           Preventive health issues were discussed with patient, and age appropriate screening tests were ordered as noted in patient's After Visit Summary  Personalized health advice and appropriate referrals for health education or preventive services given if needed, as noted in patient's After Visit Summary       History of Present Illness:     Patient presents for Medicare Annual Wellness visit    Patient Care Team:  Catracho Villarreal MD as PCP - General  Valerie Suggs MD     Problem List:     Patient Active Problem List   Diagnosis    [facility]D) [facility] [address] [facility]    Essential hypertension    Gout    Hypercoagulable state ([facility] [address] [facility]    Hyperlipidemia    Degeneration of intervertebral disc of lumbar region    Low back pain    ED (erectile dysfunction)    Edema    Mediastinal lymphadenopathy    Neuropathy    Ventral hernia    Multiple lung nodules on CT    Former smoker    Nuclear sclerotic cataract of both eyes    Lumbar radiculopathy    Anxiety    Prediabetes      Past Medical and Surgical History:     Past Medical History:   Diagnosis Date    Colon polyp     COPD [facility]chronic obstructive pulmonary disease) ([facility] [address] [facility]     H/O colonoscopy 08/22/2011    DESC 8/22/2011    Hyperlipidemia     Hypertension     Shortness of breath     Sleep apnea     Synovitis and tenosynovitis      Past Surgical History:   Procedure Laterality Date    BACK SURGERY      COLONOSCOPY      HERNIA REPAIR      KNEE SURGERY Right     LUMBAR LAMINECTOMY        Family History:     Family History   Problem Relation Age of Onset    Alzheimer's disease Mother     Lung cancer Father     Multiple sclerosis Father       Social History:     Social History     Socioeconomic History    Marital status:      Spouse name: None    Number of children: None  Years of education: None    Highest education level: None   Occupational History    None   Tobacco Use    Smoking status: Former Smoker    Smokeless tobacco: Never Used    Tobacco comment: stopped 7 years ago   Vaping Use    Vaping Use: Never used   Substance and Sexual Activity    Alcohol use: No    Drug use: No    Sexual activity: None   Other Topics Concern    None   Social History Narrative    ALWAYS USES SEAT BELTS        EXERCISES REGULARLY-STATIONARY BIKE    SEEING A DENTIST     Social Determinants of Health     Financial Resource Strain: Not on file   Food Insecurity: Not on file   Transportation Needs: Not on file   Physical Activity: Not on file   Stress: Not on file   Social Connections: Not on file   Intimate Partner Violence: Not on file   Housing Stability: Not on file      Medications and Allergies:     Current Outpatient Medications   Medication Sig Dispense Refill    albuterol (PROAIR HFA) 90 mcg/act inhaler Inhale 2 puffs every 4 (four) hours as needed      allopurinol (ZYLOPRIM) 100 mg tablet TAKE ONE TABLET BY MOUTH DAILY 90 tablet 5    atorvastatin (LIPITOR) 40 mg tablet TAKE 1 TABLET BY MOUTH EVERY DAY 90 tablet 3    diazepam (VALIUM) 5 mg tablet TAKE 1 TABLET (5 MG TOTAL) BY MOUTH EVERY 6 (SIX) HOURS AS NEEDED FOR ANXIETY 90 tablet 3    fenofibrate micronized (LOFIBRA) 134 MG capsule Take 1 capsule (134 mg total) by mouth daily 30 capsule 0    metoprolol succinate (TOPROL-XL) 50 mg 24 hr tablet Take 1 tablet (50 mg total) by mouth daily 90 tablet 3    metroNIDAZOLE (METROGEL) 0 75 % gel Apply topically 2 (two) times a day 45 g 5    Multiple Vitamin (DAILY VALUE MULTIVITAMIN) TABS Take 1 tablet by mouth daily      Omega-3 Fatty Acids (fish oil) 1,000 mg Take 1,000 mg by mouth daily      oxyCODONE (ROXICODONE) 5 immediate release tablet TAKE 1 TABLET (5 MG TOTAL) BY MOUTH EVERY 6 (SIX) HOURS AS NEEDED FOR MODERATE PAINMAX DAILY AMOUNT: 20  tablet 0    pregabalin (LYRICA) 150 mg capsule TAKE 1 CAPSULE (150 MG TOTAL) BY MOUTH 2 (TWO) TIMES A  capsule 5    tadalafil (CIALIS) 20 MG tablet Take 1 tablet (20 mg total) by mouth daily as needed for erectile dysfunction 100 tablet 5    Trelegy Ellipta 100-62 5-25 MCG/INH inhaler Inhale 1 puff daily      triamcinolone (KENALOG) 0 5 % cream Apply topically 3 (three) times a day 30 g 0    Xarelto 20 MG tablet TAKE ONE TABLET BY MOUTH DAILY 90 tablet 3    nystatin (MYCOSTATIN) 500,000 units/5 mL suspension APPLY 5 ML TO THE MOUTH OR THROAT 4 TIMES A DAY FOR 7 DAYS (Patient not taking: Reported on 12/31/2020) 473 mL 0     No current facility-administered medications for this visit  Allergies   Allergen Reactions    Penicillins Hives      Immunizations:     Immunization History   Administered Date(s) Administered    COVID-19 PFIZER VACCINE 0 3 ML IM 03/02/2021, 03/23/2021, 10/21/2021    INFLUENZA 10/31/2007, 10/29/2008, 10/28/2009, 11/03/2014, 09/18/2015, 09/15/2016, 09/30/2017, 09/28/2018, 09/09/2020, 09/20/2021    Influenza, injectable, quadrivalent, preservative free 0 5 mL 09/15/2018    Influenza, seasonal, injectable 09/18/2015, 09/30/2017    Pneumococcal Conjugate 13-Valent 09/21/2015, 09/15/2016    Pneumococcal Polysaccharide PPV23 12/12/2017    Tdap 09/09/2020    Zoster 02/29/2016    Zoster Vaccine Recombinant 09/13/2019, 11/18/2019    influenza, trivalent, adjuvanted 09/13/2019, 09/09/2020      Health Maintenance:         Topic Date Due    Colorectal Cancer Screening  06/28/2029    Hepatitis C Screening  Discontinued     There are no preventive care reminders to display for this patient  Medicare Health Risk Assessment:     /74 (BP Location: Left arm, Patient Position: Sitting, Cuff Size: Large)   Pulse 64   Ht 6' (1 829 m)   Wt 122 kg (269 lb 12 8 oz)   SpO2 96%   BMI 36 59 kg/m²      Izabela Mack is here for his Subsequent Wellness visit   Last Medicare Wellness visit information reviewed, patient interviewed and updates made to the record today  Health Risk Assessment:   Patient rates overall health as poor  Patient feels that their physical health rating is same  Patient is dissatisfied with their life  Eyesight was rated as same  Hearing was rated as same  Patient feels that their emotional and mental health rating is same  Patients states they are sometimes angry  Patient states they are often unusually tired/fatigued  Pain experienced in the last 7 days has been some  Patient's pain rating has been 5/10  Patient states that he has experienced weight loss or gain in last 6 months  Depression Screening:   PHQ-2 Score: 0      Fall Risk Screening: In the past year, patient has experienced: history of falling in past year    Number of falls: 1  Injured during fall?: No    Feels unsteady when standing or walking?: Yes    Worried about falling?: No      Home Safety:  Patient has trouble with stairs inside or outside of their home  Patient has working smoke alarms and has working carbon monoxide detector  Home safety hazards include: none  Nutrition:   Current diet is Regular and Limited junk food  Medications:   Patient is currently taking over-the-counter supplements  OTC medications include: see medication list  Patient is able to manage medications  Activities of Daily Living (ADLs)/Instrumental Activities of Daily Living (IADLs):   Walk and transfer into and out of bed and chair?: Yes  Dress and groom yourself?: Yes    Bathe or shower yourself?: Yes    Feed yourself? Yes  Do your laundry/housekeeping?: Yes  Manage your money, pay your bills and track your expenses?: Yes  Make your own meals?: Yes    Do your own shopping?: Yes    Previous Hospitalizations:   Any hospitalizations or ED visits within the last 12 months?: No      Advance Care Planning:   Living will: Yes    Durable POA for healthcare:  Yes    Advanced directive: Yes    Advanced directive counseling given: Yes    Five wishes given: No    End of Life Decisions reviewed with patient: Yes    Provider agrees with end of life decisions: Yes      PREVENTIVE SCREENINGS      Cardiovascular Screening:    General: Screening Not Indicated and History Lipid Disorder      Diabetes Screening:     General: Screening Current      Colorectal Cancer Screening:     General: Screening Current      Prostate Cancer Screening:    General: Screening Current      Osteoporosis Screening:    General: Screening Not Indicated      Abdominal Aortic Aneurysm (AAA) Screening:    Risk factors include: age between 73-67 yo and tobacco use        General: Screening Current      Lung Cancer Screening:     General: Screening Not Indicated      Hepatitis C Screening:    General: Screening Current    Screening, Brief Intervention, and Referral to Treatment (SBIRT)    Screening  Typical number of drinks in a day: 0  Typical number of drinks in a week: 0  Interpretation: Low risk drinking behavior  Single Item Drug Screening:  How often have you used an illegal drug (including marijuana) or a prescription medication for non-medical reasons in the past year? never    Single Item Drug Screen Score: 0  Interpretation: Negative screen for possible drug use disorder    Review of Current Opioid Use    Opioid Risk Tool (ORT) Interpretation: Complete Opioid Risk Tool (ORT)    Other Counseling Topics:   Car/seat belt/driving safety         Yanely Carlin MD

## 2022-02-14 NOTE — PATIENT INSTRUCTIONS
Medicare Preventive Visit Patient Instructions  Thank you for completing your Welcome to Medicare Visit or Medicare Annual Wellness Visit today  Your next wellness visit will be due in one year (2/15/2023)  The screening/preventive services that you may require over the next 5-10 years are detailed below  Some tests may not apply to you based off risk factors and/or age  Screening tests ordered at today's visit but not completed yet may show as past due  Also, please note that scanned in results may not display below  Preventive Screenings:  Service Recommendations Previous Testing/Comments   Colorectal Cancer Screening  · Colonoscopy    · Fecal Occult Blood Test (FOBT)/Fecal Immunochemical Test (FIT)  · Fecal DNA/Cologuard Test  · Flexible Sigmoidoscopy Age: 54-65 years old   Colonoscopy: every 10 years (May be performed more frequently if at higher risk)  OR  FOBT/FIT: every 1 year  OR  Cologuard: every 3 years  OR  Sigmoidoscopy: every 5 years  Screening may be recommended earlier than age 48 if at higher risk for colorectal cancer  Also, an individualized decision between you and your healthcare provider will decide whether screening between the ages of 74-80 would be appropriate   Colonoscopy: 06/28/2019  FOBT/FIT: Not on file  Cologuard: Not on file  Sigmoidoscopy: Not on file    Screening Current     Prostate Cancer Screening Individualized decision between patient and health care provider in men between ages of 53-78   Medicare will cover every 12 months beginning on the day after your 50th birthday PSA: 3 0 ng/mL     Screening Current     Hepatitis C Screening Once for adults born between 1945 and 1965  More frequently in patients at high risk for Hepatitis C Hep C Antibody: Not on file        Diabetes Screening 1-2 times per year if you're at risk for diabetes or have pre-diabetes Fasting glucose: No results in last 5 years   A1C: 6 7 % of total Hgb    Screening Current   Cholesterol Screening Once every 5 years if you don't have a lipid disorder  May order more often based on risk factors  Lipid panel: 02/07/2022    Screening Not Indicated  History Lipid Disorder      Other Preventive Screenings Covered by Medicare:  1  Abdominal Aortic Aneurysm (AAA) Screening: covered once if your at risk  You're considered to be at risk if you have a family history of AAA or a male between the age of 73-68 who smoking at least 100 cigarettes in your lifetime  2  Lung Cancer Screening: covers low dose CT scan once per year if you meet all of the following conditions: (1) Age 50-69; (2) No signs or symptoms of lung cancer; (3) Current smoker or have quit smoking within the last 15 years; (4) You have a tobacco smoking history of at least 30 pack years (packs per day x number of years you smoked); (5) You get a written order from a healthcare provider  3  Glaucoma Screening: covered annually if you're considered high risk: (1) You have diabetes OR (2) Family history of glaucoma OR (3)  aged 48 and older OR (3)  American aged 72 and older  3  Osteoporosis Screening: covered every 2 years if you meet one of the following conditions: (1) Have a vertebral abnormality; (2) On glucocorticoid therapy for more than 3 months; (3) Have primary hyperparathyroidism; (4) On osteoporosis medications and need to assess response to drug therapy  5  HIV Screening: covered annually if you're between the age of 12-76  Also covered annually if you are younger than 13 and older than 72 with risk factors for HIV infection  For pregnant patients, it is covered up to 3 times per pregnancy      Immunizations:  Immunization Recommendations   Influenza Vaccine Annual influenza vaccination during flu season is recommended for all persons aged >= 6 months who do not have contraindications   Pneumococcal Vaccine (Prevnar and Pneumovax)  * Prevnar = PCV13  * Pneumovax = PPSV23 Adults 25-60 years old: 1-3 doses may be recommended based on certain risk factors  Adults 72 years old: Prevnar (PCV13) vaccine recommended followed by Pneumovax (PPSV23) vaccine  If already received PPSV23 since turning 65, then PCV13 recommended at least one year after PPSV23 dose  Hepatitis B Vaccine 3 dose series if at intermediate or high risk (ex: diabetes, end stage renal disease, liver disease)   Tetanus (Td) Vaccine - COST NOT COVERED BY MEDICARE PART B Following completion of primary series, a booster dose should be given every 10 years to maintain immunity against tetanus  Td may also be given as tetanus wound prophylaxis  Tdap Vaccine - COST NOT COVERED BY MEDICARE PART B Recommended at least once for all adults  For pregnant patients, recommended with each pregnancy  Shingles Vaccine (Shingrix) - COST NOT COVERED BY MEDICARE PART B  2 shot series recommended in those aged 48 and above     Health Maintenance Due:      Topic Date Due    Colorectal Cancer Screening  06/28/2029    Hepatitis C Screening  Discontinued     Immunizations Due:  There are no preventive care reminders to display for this patient  Advance Directives   What are advance directives? Advance directives are legal documents that state your wishes and plans for medical care  These plans are made ahead of time in case you lose your ability to make decisions for yourself  Advance directives can apply to any medical decision, such as the treatments you want, and if you want to donate organs  What are the types of advance directives? There are many types of advance directives, and each state has rules about how to use them  You may choose a combination of any of the following:  · Living will: This is a written record of the treatment you want  You can also choose which treatments you do not want, which to limit, and which to stop at a certain time  This includes surgery, medicine, IV fluid, and tube feedings  · Durable power of  for healthcare [facility]):   This is a written record that states who you want to make healthcare choices for you when you are unable to make them for yourself  This person, called a proxy, is usually a family member or a friend  You may choose more than 1 proxy  · Do not resuscitate (DNR) order:  A DNR order is used in case your heart stops beating or you stop breathing  It is a request not to have certain forms of treatment, such as CPR  A DNR order may be included in other types of advance directives  · Medical directive: This covers the care that you want if you are in a coma, near death, or unable to make decisions for yourself  You can list the treatments you want for each condition  Treatment may include pain medicine, surgery, blood transfusions, dialysis, IV or tube feedings, and a ventilator (breathing machine)  · Values history: This document has questions about your views, beliefs, and how you feel and think about life  This information can help others choose the care that you would choose  Why are advance directives important? An advance directive helps you control your care  Although spoken wishes may be used, it is better to have your wishes written down  Spoken wishes can be misunderstood, or not followed  Treatments may be given even if you do not want them  An advance directive may make it easier for your family to make difficult choices about your care  Fall Prevention    Fall prevention  includes ways to make your home and other areas safer  It also includes ways you can move more carefully to prevent a fall  Health conditions that cause changes in your blood pressure, vision, or muscle strength and coordination may increase your risk for falls  Medicines may also increase your risk for falls if they make you dizzy, weak, or sleepy  Fall prevention tips:   · Stand or sit up slowly  · Use assistive devices as directed  · Wear shoes that fit well and have soles that   · Wear a personal alarm  · Stay active  · Manage your medical conditions  Home Safety Tips:  · Add items to prevent falls in the bathroom  · Keep paths clear  · Install bright lights in your home  · Keep items you use often on shelves within reach  · Paint or place reflective tape on the edges of your stairs  Weight Management   Why it is important to manage your weight:  Being overweight increases your risk of health conditions such as heart disease, high blood pressure, type 2 diabetes, and certain types of cancer  It can also increase your risk for osteoarthritis, sleep apnea, and other respiratory problems  Aim for a slow, steady weight loss  Even a small amount of weight loss can lower your risk of health problems  How to lose weight safely:  A safe and healthy way to lose weight is to eat fewer calories and get regular exercise  You can lose up about 1 pound a week by decreasing the number of calories you eat by 500 calories each day  Healthy meal plan for weight management:  A healthy meal plan includes a variety of foods, contains fewer calories, and helps you stay healthy  A healthy meal plan includes the following:  · Eat whole-grain foods more often  A healthy meal plan should contain fiber  Fiber is the part of grains, fruits, and vegetables that is not broken down by your body  Whole-grain foods are healthy and provide extra fiber in your diet  Some examples of whole-grain foods are whole-wheat breads and pastas, oatmeal, brown rice, and bulgur  · Eat a variety of vegetables every day  Include dark, leafy greens such as spinach, kale, janna greens, and mustard greens  Eat yellow and orange vegetables such as carrots, sweet potatoes, and winter squash  · Eat a variety of fruits every day  Choose fresh or canned fruit (canned in its own juice or light syrup) instead of juice  Fruit juice has very little or no fiber  · Eat low-fat dairy foods  Drink fat-free (skim) milk or 1% milk   Eat fat-free yogurt and low-fat cottage cheese  Try low-fat cheeses such as mozzarella and other reduced-fat cheeses  · Choose meat and other protein foods that are low in fat  Choose beans or other legumes such as split peas or lentils  Choose fish, skinless poultry (chicken or turkey), or lean cuts of red meat (beef or pork)  Before you cook meat or poultry, cut off any visible fat  · Use less fat and oil  Try baking foods instead of frying them  Add less fat, such as margarine, sour cream, regular salad dressing and mayonnaise to foods  Eat fewer high-fat foods  Some examples of high-fat foods include french fries, doughnuts, ice cream, and cakes  · Eat fewer sweets  Limit foods and drinks that are high in sugar  This includes candy, cookies, regular soda, and sweetened drinks  Exercise:  Exercise at least 30 minutes per day on most days of the week  Some examples of exercise include walking, biking, dancing, and swimming  You can also fit in more physical activity by taking the stairs instead of the elevator or parking farther away from stores  Ask your healthcare provider about the best exercise plan for you  Narcotic (Opioid) Safety    Use narcotics safely:  · Take prescribed narcotics exactly as directed  · Do not give narcotics to others or take narcotics that belong to someone else  · Do not mix narcotics without medicines or alcohol  · Do not drive or operate heavy machinery after you take the narcotic  · Monitor for side effects and notify your healthcare provider if you experienced side effects such as nausea, sleepiness, itching, or trouble thinking clearly  Manage constipation:    Constipation is the most common side effect of narcotic medicine  Constipation is when you have hard, dry bowel movements, or you go longer than usual between bowel movements  Tell your healthcare provider about all changes in your bowel movements while you are taking narcotics   He or she may recommend laxative medicine to help you have a bowel movement  He or she may also change the kind of narcotic you are taking, or change when you take it  The following are more ways you can prevent or relieve constipation:    · Drink liquids as directed  You may need to drink extra liquids to help soften and move your bowels  Ask how much liquid to drink each day and which liquids are best for you  · Eat high-fiber foods  This may help decrease constipation by adding bulk to your bowel movements  High-fiber foods include fruits, vegetables, whole-grain breads and cereals, and beans  Your healthcare provider or dietitian can help you create a high-fiber meal plan  Your provider may also recommend a fiber supplement if you cannot get enough fiber from food  · Exercise regularly  Regular physical activity can help stimulate your intestines  Walking is a good exercise to prevent or relieve constipation  Ask which exercises are best for you  · Schedule a time each day to have a bowel movement  This may help train your body to have regular bowel movements  Bend forward while you are on the toilet to help move the bowel movement out  Sit on the toilet for at least 10 minutes, even if you do not have a bowel movement  Store narcotics safely:   · Store narcotics where others cannot easily get them  Keep them in a locked cabinet or secure area  Do not  keep them in a purse or other bag you carry with you  A person may be looking for something else and find the narcotics  · Make sure narcotics are stored out of the reach of children  A child can easily overdose on narcotics  Narcotics may look like candy to a small child  The best way to dispose of narcotics: The laws vary by country and area  In the [place], the best way is to return the narcotics through a take-back program  This program is offered by the [employer] ([employer])  The following are options for using the program:  · Take the narcotics to a [employer] collection site    The site is often a law enforcement center  Call your local law enforcement center for scheduled take-back days in your area  You will be given information on where to go if the collection site is in a different location  · Take the narcotics to an approved pharmacy or hospital   A pharmacy or hospital may be set up as a collection site  You will need to ask if it is a BRAND[employer] collection site if you were not directed there  A pharmacy or doctor's office may not be able to take back narcotics unless it is a BRANDY site  · Use a mail-back system  This means you are given containers to put the narcotics into  You will then mail them in the containers  · Use a take-back drop box  This is a place to leave the narcotics at any time  People and animals will not be able to get into the box  Your local law enforcement agency can tell you where to find a drop box in your area  Other ways to manage pain:   · Ask your healthcare provider about non-narcotic medicines to control pain  Nonprescription medicines include NSAIDs (such as ibuprofen) and acetaminophen  Prescription medicines include muscle relaxers, antidepressants, and steroids  · Pain may be managed without any medicines  Some ways to relieve pain include massage, aromatherapy, or meditation  Physical or occupational therapy may also help  For more information:   · Drug Enforcement Administration  [facility] [facility] [address]  Phone: 7- 693 - [phone]  Web Address: [email or web address]    · [address]  and Drug Administration  [facility] , [address] [facility]  Phone: 3- 118 - [phone]  Web Address: http://icix/     © Copyright [employer] 2018 Information is for End User's use only and may not be sold, redistributed or otherwise used for commercial purposes   All illustrations and images included in CareNotes® are the copyrighted property of [employer]  or [facility] Preventive Visit Patient Instructions  Thank you for completing your Welcome to Medicare Visit or Medicare Annual Wellness Visit today  Your next wellness visit will be due in one year (2/15/2023)  The screening/preventive services that you may require over the next 5-10 years are detailed below  Some tests may not apply to you based off risk factors and/or age  Screening tests ordered at today's visit but not completed yet may show as past due  Also, please note that scanned in results may not display below  Preventive Screenings:  Service Recommendations Previous Testing/Comments   Colorectal Cancer Screening  · Colonoscopy    · Fecal Occult Blood Test (FOBT)/Fecal Immunochemical Test (FIT)  · Fecal DNA/Cologuard Test  · Flexible Sigmoidoscopy Age: 54-65 years old   Colonoscopy: every 10 years (May be performed more frequently if at higher risk)  OR  FOBT/FIT: every 1 year  OR  Cologuard: every 3 years  OR  Sigmoidoscopy: every 5 years  Screening may be recommended earlier than age 48 if at higher risk for colorectal cancer  Also, an individualized decision between you and your healthcare provider will decide whether screening between the ages of 74-80 would be appropriate   Colonoscopy: 06/28/2019  FOBT/FIT: Not on file  Cologuard: Not on file  Sigmoidoscopy: Not on file    Screening Current     Prostate Cancer Screening Individualized decision between patient and health care provider in men between ages of 53-78   Medicare will cover every 12 months beginning on the day after your 50th birthday PSA: 3 0 ng/mL     Screening Current     Hepatitis C Screening Once for adults born between 1945 and 1965  More frequently in patients at high risk for Hepatitis C Hep C Antibody: Not on file        Diabetes Screening 1-2 times per year if you're at risk for diabetes or have pre-diabetes Fasting glucose: No results in last 5 years   A1C: 6 7 % of total Hgb    Screening Current   Cholesterol Screening Once every 5 years if you don't have a lipid disorder  May order more often based on risk factors  Lipid panel: 02/07/2022    Screening Not Indicated  History Lipid Disorder      Other Preventive Screenings Covered by Medicare:  6  Abdominal Aortic Aneurysm (AAA) Screening: covered once if your at risk  You're considered to be at risk if you have a family history of AAA or a male between the age of 73-68 who smoking at least 100 cigarettes in your lifetime  7  Lung Cancer Screening: covers low dose CT scan once per year if you meet all of the following conditions: (1) Age 50-69; (2) No signs or symptoms of lung cancer; (3) Current smoker or have quit smoking within the last 15 years; (4) You have a tobacco smoking history of at least 30 pack years (packs per day x number of years you smoked); (5) You get a written order from a healthcare provider  8  Glaucoma Screening: covered annually if you're considered high risk: (1) You have diabetes OR (2) Family history of glaucoma OR (3)  aged 48 and older OR (3)  American aged 72 and older  5  Osteoporosis Screening: covered every 2 years if you meet one of the following conditions: (1) Have a vertebral abnormality; (2) On glucocorticoid therapy for more than 3 months; (3) Have primary hyperparathyroidism; (4) On osteoporosis medications and need to assess response to drug therapy  10  HIV Screening: covered annually if you're between the age of 12-76  Also covered annually if you are younger than 13 and older than 72 with risk factors for HIV infection  For pregnant patients, it is covered up to 3 times per pregnancy      Immunizations:  Immunization Recommendations   Influenza Vaccine Annual influenza vaccination during flu season is recommended for all persons aged >= 6 months who do not have contraindications   Pneumococcal Vaccine (Prevnar and Pneumovax)  * Prevnar = PCV13  * Pneumovax = PPSV23 Adults 25-60 years old: 1-3 doses may be recommended based on certain risk factors  Adults 72 years old: Prevnar (PCV13) vaccine recommended followed by Pneumovax (PPSV23) vaccine  If already received PPSV23 since turning 65, then PCV13 recommended at least one year after PPSV23 dose  Hepatitis B Vaccine 3 dose series if at intermediate or high risk (ex: diabetes, end stage renal disease, liver disease)   Tetanus (Td) Vaccine - COST NOT COVERED BY MEDICARE PART B Following completion of primary series, a booster dose should be given every 10 years to maintain immunity against tetanus  Td may also be given as tetanus wound prophylaxis  Tdap Vaccine - COST NOT COVERED BY MEDICARE PART B Recommended at least once for all adults  For pregnant patients, recommended with each pregnancy  Shingles Vaccine (Shingrix) - COST NOT COVERED BY MEDICARE PART B  2 shot series recommended in those aged 48 and above     Health Maintenance Due:      Topic Date Due    Colorectal Cancer Screening  06/28/2029    Hepatitis C Screening  Discontinued     Immunizations Due:  There are no preventive care reminders to display for this patient  Advance Directives   What are advance directives? Advance directives are legal documents that state your wishes and plans for medical care  These plans are made ahead of time in case you lose your ability to make decisions for yourself  Advance directives can apply to any medical decision, such as the treatments you want, and if you want to donate organs  What are the types of advance directives? There are many types of advance directives, and each state has rules about how to use them  You may choose a combination of any of the following:  · Living will: This is a written record of the treatment you want  You can also choose which treatments you do not want, which to limit, and which to stop at a certain time  This includes surgery, medicine, IV fluid, and tube feedings  · Durable power of  for healthcare [place] [facility]):   This is a written record that states who you want to make healthcare choices for you when you are unable to make them for yourself  This person, called a proxy, is usually a family member or a friend  You may choose more than 1 proxy  · Do not resuscitate (DNR) order:  A DNR order is used in case your heart stops beating or you stop breathing  It is a request not to have certain forms of treatment, such as CPR  A DNR order may be included in other types of advance directives  · Medical directive: This covers the care that you want if you are in a coma, near death, or unable to make decisions for yourself  You can list the treatments you want for each condition  Treatment may include pain medicine, surgery, blood transfusions, dialysis, IV or tube feedings, and a ventilator (breathing machine)  · Values history: This document has questions about your views, beliefs, and how you feel and think about life  This information can help others choose the care that you would choose  Why are advance directives important? An advance directive helps you control your care  Although spoken wishes may be used, it is better to have your wishes written down  Spoken wishes can be misunderstood, or not followed  Treatments may be given even if you do not want them  An advance directive may make it easier for your family to make difficult choices about your care  Fall Prevention    Fall prevention  includes ways to make your home and other areas safer  It also includes ways you can move more carefully to prevent a fall  Health conditions that cause changes in your blood pressure, vision, or muscle strength and coordination may increase your risk for falls  Medicines may also increase your risk for falls if they make you dizzy, weak, or sleepy  Fall prevention tips:   · Stand or sit up slowly  · Use assistive devices as directed  · Wear shoes that fit well and have soles that   · Wear a personal alarm  · Stay active      · Manage your medical conditions  Home Safety Tips:  · Add items to prevent falls in the bathroom  · Keep paths clear  · Install bright lights in your home  · Keep items you use often on shelves within reach  · Paint or place reflective tape on the edges of your stairs  Weight Management   Why it is important to manage your weight:  Being overweight increases your risk of health conditions such as heart disease, high blood pressure, type 2 diabetes, and certain types of cancer  It can also increase your risk for osteoarthritis, sleep apnea, and other respiratory problems  Aim for a slow, steady weight loss  Even a small amount of weight loss can lower your risk of health problems  How to lose weight safely:  A safe and healthy way to lose weight is to eat fewer calories and get regular exercise  You can lose up about 1 pound a week by decreasing the number of calories you eat by 500 calories each day  Healthy meal plan for weight management:  A healthy meal plan includes a variety of foods, contains fewer calories, and helps you stay healthy  A healthy meal plan includes the following:  · Eat whole-grain foods more often  A healthy meal plan should contain fiber  Fiber is the part of grains, fruits, and vegetables that is not broken down by your body  Whole-grain foods are healthy and provide extra fiber in your diet  Some examples of whole-grain foods are whole-wheat breads and pastas, oatmeal, brown rice, and bulgur  · Eat a variety of vegetables every day  Include dark, leafy greens such as spinach, kale, janna greens, and mustard greens  Eat yellow and orange vegetables such as carrots, sweet potatoes, and winter squash  · Eat a variety of fruits every day  Choose fresh or canned fruit (canned in its own juice or light syrup) instead of juice  Fruit juice has very little or no fiber  · Eat low-fat dairy foods  Drink fat-free (skim) milk or 1% milk  Eat fat-free yogurt and low-fat cottage cheese   Try low-fat cheeses such as mozzarella and other reduced-fat cheeses  · Choose meat and other protein foods that are low in fat  Choose beans or other legumes such as split peas or lentils  Choose fish, skinless poultry (chicken or turkey), or lean cuts of red meat (beef or pork)  Before you cook meat or poultry, cut off any visible fat  · Use less fat and oil  Try baking foods instead of frying them  Add less fat, such as margarine, sour cream, regular salad dressing and mayonnaise to foods  Eat fewer high-fat foods  Some examples of high-fat foods include french fries, doughnuts, ice cream, and cakes  · Eat fewer sweets  Limit foods and drinks that are high in sugar  This includes candy, cookies, regular soda, and sweetened drinks  Exercise:  Exercise at least 30 minutes per day on most days of the week  Some examples of exercise include walking, biking, dancing, and swimming  You can also fit in more physical activity by taking the stairs instead of the elevator or parking farther away from stores  Ask your healthcare provider about the best exercise plan for you  Narcotic (Opioid) Safety    Use narcotics safely:  · Take prescribed narcotics exactly as directed  · Do not give narcotics to others or take narcotics that belong to someone else  · Do not mix narcotics without medicines or alcohol  · Do not drive or operate heavy machinery after you take the narcotic  · Monitor for side effects and notify your healthcare provider if you experienced side effects such as nausea, sleepiness, itching, or trouble thinking clearly  Manage constipation:    Constipation is the most common side effect of narcotic medicine  Constipation is when you have hard, dry bowel movements, or you go longer than usual between bowel movements  Tell your healthcare provider about all changes in your bowel movements while you are taking narcotics  He or she may recommend laxative medicine to help you have a bowel movement   He or she may also change the kind of narcotic you are taking, or change when you take it  The following are more ways you can prevent or relieve constipation:    · Drink liquids as directed  You may need to drink extra liquids to help soften and move your bowels  Ask how much liquid to drink each day and which liquids are best for you  · Eat high-fiber foods  This may help decrease constipation by adding bulk to your bowel movements  High-fiber foods include fruits, vegetables, whole-grain breads and cereals, and beans  Your healthcare provider or dietitian can help you create a high-fiber meal plan  Your provider may also recommend a fiber supplement if you cannot get enough fiber from food  · Exercise regularly  Regular physical activity can help stimulate your intestines  Walking is a good exercise to prevent or relieve constipation  Ask which exercises are best for you  · Schedule a time each day to have a bowel movement  This may help train your body to have regular bowel movements  Bend forward while you are on the toilet to help move the bowel movement out  Sit on the toilet for at least 10 minutes, even if you do not have a bowel movement  Store narcotics safely:   · Store narcotics where others cannot easily get them  Keep them in a locked cabinet or secure area  Do not  keep them in a purse or other bag you carry with you  A person may be looking for something else and find the narcotics  · Make sure narcotics are stored out of the reach of children  A child can easily overdose on narcotics  Narcotics may look like candy to a small child  The best way to dispose of narcotics: The laws vary by country and area  In the [place], the best way is to return the narcotics through a take-back program  This program is offered by the [employer] ([employer])  The following are options for using the program:  · Take the narcotics to a BRANDY collection site    The site is often a law enforcement center  Call your local law enforcement center for scheduled take-back days in your area  You will be given information on where to go if the collection site is in a different location  · Take the narcotics to an approved pharmacy or hospital   A pharmacy or hospital may be set up as a collection site  You will need to ask if it is a BRANDY collection site if you were not directed there  A pharmacy or doctor's office may not be able to take back narcotics unless it is a BRANDY site  · Use a mail-back system  This means you are given containers to put the narcotics into  You will then mail them in the containers  · Use a take-back drop box  This is a place to leave the narcotics at any time  People and animals will not be able to get into the box  Your local law enforcement agency can tell you where to find a drop box in your area  Other ways to manage pain:   · Ask your healthcare provider about non-narcotic medicines to control pain  Nonprescription medicines include NSAIDs (such as ibuprofen) and acetaminophen  Prescription medicines include muscle relaxers, antidepressants, and steroids  · Pain may be managed without any medicines  Some ways to relieve pain include massage, aromatherapy, or meditation  Physical or occupational therapy may also help  For more information:   · Drug Enforcement Administration  [address] [place] [address] [address]  Phone: 5- 435 - [phone]  Web Address: [facility]/drug_disposal/    · [address] and Drug Administration  [facility] , [address] [facility]  Phone: 2- 415 - 600-9950  Web Address: http://[employer]/     © Copyright [employer] 2018 Information is for End User's use only and may not be sold, redistributed or otherwise used for commercial purposes   All illustrations and images included in CareNotes® are the copyrighted property of [employer]  or [address] [place] [address] [place]

## 2022-02-14 NOTE — PROGRESS NOTES
BMI Counseling: Body mass index is 36 59 kg/m²  The BMI is above normal  Nutrition recommendations include decreasing portion sizes and moderation in carbohydrate intake  Exercise recommendations include exercising 3-5 times per week  No pharmacotherapy was ordered  Rationale for BMI follow-up plan is due to patient being overweight or obese  Depression Screening and Follow-up Plan: Patient was screened for depression during today's encounter  They screened negative with a PHQ-2 score of 0  Assessment/Plan:         Problem List Items Addressed This Visit        Respiratory    Chronic obstructive pulmonary disease (COPD) ([facility] [address] )       Cardiovascular and Mediastinum    Essential hypertension     Toprol-XL 50 mg daily            Nervous and Auditory    Neuropathy       Musculoskeletal and Integument    Degeneration of intervertebral disc of lumbar region    Relevant Medications    diazepam (VALIUM) 5 mg tablet    Atopic dermatitis     Triamcinolone 0 5% cream            Other    Gout    Relevant Orders    Uric acid    Hypercoagulable state (HCC)     Continue Xarelto 20 mg daily         Hyperlipidemia     Continue Lipitor 40 mg daily         Relevant Orders    CBC and differential    Comprehensive metabolic panel    Lipid panel    Class 2 severe obesity due to excess calories with serious comorbidity and body mass index (BMI) of 36 0 to 36 9 in adult (HCC)    Prediabetes     Low carb diet         Relevant Orders    Hemoglobin A1C    Microalbumin / creatinine urine ratio    UA (URINE) with reflex to Scope    Chronic pain syndrome    Relevant Medications    oxyCODONE (ROXICODONE) 5 immediate release tablet    Continuous opioid dependence ([facility] [address] )      Other Visit Diagnoses     Medicare annual wellness visit, subsequent    -  Primary    Prostate cancer screening        Relevant Orders    PSA, Total Screen            Subjective:      Patient ID: Chayo Lux is a 78 y o  male  Patient comes in for checkup    He complains of dry cracked skin of his hands  The following portions of the patient's history were reviewed and updated as appropriate:   Past Medical History:  He has a past medical history of Colon polyp, COPD (chronic obstructive pulmonary disease) ([address] ), H/O colonoscopy (08/22/2011), Hyperlipidemia, Hypertension, Shortness of breath, Sleep apnea, and Synovitis and tenosynovitis  ,  _______________________________________________________________________  Medical Problems:  does not have any pertinent problems on file ,  _______________________________________________________________________  Past Surgical History:   has a past surgical history that includes Knee surgery (Right); Lumbar laminectomy; Hernia repair; Back surgery; and Colonoscopy  ,  _______________________________________________________________________  Family History:  family history includes Alzheimer's disease in his mother; Lung cancer in his father; Multiple sclerosis in his father ,  _______________________________________________________________________  Social History:   reports that he has quit smoking  He has never used smokeless tobacco  He reports that he does not drink alcohol and does not use drugs  ,  _______________________________________________________________________  Allergies:  is allergic to penicillins     _______________________________________________________________________  Current Outpatient Medications   Medication Sig Dispense Refill    albuterol (PROAIR HFA) 90 mcg/act inhaler Inhale 2 puffs every 4 (four) hours as needed      allopurinol (ZYLOPRIM) 100 mg tablet TAKE ONE TABLET BY MOUTH DAILY 90 tablet 5    atorvastatin (LIPITOR) 40 mg tablet TAKE 1 TABLET BY MOUTH EVERY DAY 90 tablet 3    diazepam (VALIUM) 5 mg tablet Take 1 tablet (5 mg total) by mouth 2 (two) times a day 180 tablet 1    fenofibrate micronized (LOFIBRA) 134 MG capsule Take 1 capsule (134 mg total) by mouth daily 30 capsule 0    metoprolol succinate (TOPROL-XL) 50 mg 24 hr tablet Take 1 tablet (50 mg total) by mouth daily 90 tablet 3    metroNIDAZOLE (METROGEL) 0 75 % gel Apply topically 2 (two) times a day 45 g 5    Multiple Vitamin (DAILY VALUE MULTIVITAMIN) TABS Take 1 tablet by mouth daily      Omega-3 Fatty Acids (fish oil) 1,000 mg Take 1,000 mg by mouth daily      oxyCODONE (ROXICODONE) 5 immediate release tablet 1 bid for chronicpain 180 tablet 0    pregabalin (LYRICA) 150 mg capsule TAKE 1 CAPSULE (150 MG TOTAL) BY MOUTH 2 (TWO) TIMES A  capsule 5    tadalafil (CIALIS) 20 MG tablet Take 1 tablet (20 mg total) by mouth daily as needed for erectile dysfunction 100 tablet 5    Trelegy Ellipta 100-62 5-25 MCG/INH inhaler Inhale 1 puff daily      triamcinolone (KENALOG) 0 5 % cream Apply topically 3 (three) times a day 30 g 0    Xarelto 20 MG tablet TAKE ONE TABLET BY MOUTH DAILY 90 tablet 3     No current facility-administered medications for this visit      _______________________________________________________________________  Review of Systems   Constitutional: Negative  HENT: Negative  Respiratory: Positive for shortness of breath  Cardiovascular: Negative  Musculoskeletal: Positive for back pain  Objective:  Vitals:    02/14/22 1454   BP: 128/74   BP Location: Left arm   Patient Position: Sitting   Cuff Size: Large   Pulse: 64   SpO2: 96%   Weight: 122 kg (269 lb 12 8 oz)   Height: 6' (1 829 m)     Body mass index is 36 59 kg/m²  Physical Exam  Constitutional:       Appearance: He is well-developed  He is obese  HENT:      Head: Normocephalic and atraumatic  Eyes:      Pupils: Pupils are equal, round, and reactive to light  Cardiovascular:      Rate and Rhythm: Normal rate and regular rhythm  Heart sounds: Normal heart sounds  Pulmonary:      Effort: Pulmonary effort is normal       Breath sounds: Normal breath sounds     Abdominal:      General: Bowel sounds are normal       Palpations: Abdomen is soft  Musculoskeletal:         General: Normal range of motion  Cervical back: Neck supple  Skin:     Comments: Red, cracked skin of hands bilaterally   Neurological:      Mental Status: He is alert     Psychiatric:         Mood and Affect: Mood normal          Behavior: Behavior normal

## 2022-02-18 DIAGNOSIS — E78.5 HYPERLIPIDEMIA, UNSPECIFIED HYPERLIPIDEMIA TYPE: ICD-10-CM

## 2022-02-18 DIAGNOSIS — I10 ESSENTIAL HYPERTENSION: ICD-10-CM

## 2022-02-18 DIAGNOSIS — G89.4 CHRONIC PAIN SYNDROME: ICD-10-CM

## 2022-02-18 RX ORDER — ATORVASTATIN CALCIUM 40 MG/1
TABLET, FILM COATED ORAL
Qty: 90 TABLET | Refills: 3 | Status: SHIPPED | OUTPATIENT
Start: 2022-02-18

## 2022-02-18 RX ORDER — METOPROLOL SUCCINATE 50 MG/1
TABLET, EXTENDED RELEASE ORAL
Qty: 90 TABLET | Refills: 3 | Status: SHIPPED | OUTPATIENT
Start: 2022-02-18

## 2022-02-19 RX ORDER — PREGABALIN 150 MG/1
CAPSULE ORAL
Qty: 90 CAPSULE | Refills: 5 | Status: SHIPPED | OUTPATIENT
Start: 2022-02-19

## 2022-04-06 DIAGNOSIS — E79.0 HYPERURICEMIA: ICD-10-CM

## 2022-04-07 RX ORDER — ALLOPURINOL 100 MG/1
TABLET ORAL
Qty: 90 TABLET | Refills: 5 | Status: SHIPPED | OUTPATIENT
Start: 2022-04-07

## 2022-05-05 DIAGNOSIS — E78.2 MIXED HYPERLIPIDEMIA: ICD-10-CM

## 2022-05-06 RX ORDER — FENOFIBRATE 134 MG/1
CAPSULE ORAL
Qty: 90 CAPSULE | Refills: 0 | Status: SHIPPED | OUTPATIENT
Start: 2022-05-06

## 2022-05-20 DIAGNOSIS — I82.409 DEEP VEIN THROMBOSIS (DVT) OF LOWER EXTREMITY, UNSPECIFIED CHRONICITY, UNSPECIFIED LATERALITY, UNSPECIFIED VEIN (HCC): ICD-10-CM

## 2022-05-22 RX ORDER — RIVAROXABAN 20 MG/1
TABLET, FILM COATED ORAL
Qty: 90 TABLET | Refills: 3 | Status: SHIPPED | OUTPATIENT
Start: 2022-05-22

## 2022-06-01 DIAGNOSIS — G89.4 CHRONIC PAIN SYNDROME: ICD-10-CM

## 2022-06-01 RX ORDER — OXYCODONE HYDROCHLORIDE 5 MG/1
TABLET ORAL
Qty: 180 TABLET | Refills: 0 | Status: SHIPPED | OUTPATIENT
Start: 2022-06-01

## 2022-06-01 NOTE — TELEPHONE ENCOUNTER
1 [id number] 05/26/2022 02/07/2022 Pregabalin (Capsule) 180 0 90 150 MG NA LANDY DUVAL [facility] Medicare 1 / 5 [place]    1 [id number] 04/29/2022 04/28/2022 Gabapentin (Crystal) 4 5 30   [place] [facility] Pay 0 / 5 [place]    1 [id number] 04/20/2022 02/19/2022 Pregabalin (Capsule) 90 0 90 150 MG NA LANDY [facility] Medicare 0 / 5 [place]    1 [id number] 02/25/2022 02/14/2022 diazePAM (Tablet) 180 0 90 5 MG NA LANDY DUVAL [facility] Medicare 0 / 1 [place]    1 [id number] 02/21/2022 02/14/2022 oxyCODONE HCL (Tablet) 180 0 90 5 MG 15 0 LANDY [facility] Medicare 0 / 0 PA    1 [id number] 02/07/2022 02/07/2022 diazePAM (Tablet) 90 0 23 5 MG NA LANDY DUVAL [place]  [address] Medicare 0 / 3 [place]
Medication:  PDMP
No

## 2022-08-15 LAB
ALBUMIN SERPL-MCNC: 4 G/DL (ref 3.6–5.1)
ALBUMIN/CREAT UR: 6 MCG/MG CREAT
ALBUMIN/GLOB SERPL: 1.6 (CALC) (ref 1–2.5)
ALP SERPL-CCNC: 59 U/L (ref 35–144)
ALT SERPL-CCNC: 37 U/L (ref 9–46)
APPEARANCE UR: CLEAR
AST SERPL-CCNC: 30 U/L (ref 10–35)
BACTERIA UR QL AUTO: ABNORMAL /HPF
BASOPHILS # BLD AUTO: 62 CELLS/UL (ref 0–200)
BASOPHILS NFR BLD AUTO: 0.8 %
BILIRUB SERPL-MCNC: 0.7 MG/DL (ref 0.2–1.2)
BILIRUB UR QL STRIP: NEGATIVE
BUN SERPL-MCNC: 18 MG/DL (ref 7–25)
BUN/CREAT SERPL: ABNORMAL (CALC) (ref 6–22)
CALCIUM SERPL-MCNC: 10.1 MG/DL (ref 8.6–10.3)
CHLORIDE SERPL-SCNC: 106 MMOL/L (ref 98–110)
CHOLEST SERPL-MCNC: 123 MG/DL
CHOLEST/HDLC SERPL: 3.1 (CALC)
CO2 SERPL-SCNC: 29 MMOL/L (ref 20–32)
COLOR UR: ABNORMAL
CREAT SERPL-MCNC: 1.21 MG/DL (ref 0.7–1.35)
CREAT UR-MCNC: 169 MG/DL (ref 20–320)
EOSINOPHIL # BLD AUTO: 172 CELLS/UL (ref 15–500)
EOSINOPHIL NFR BLD AUTO: 2.2 %
ERYTHROCYTE [DISTWIDTH] IN BLOOD BY AUTOMATED COUNT: 13.2 % (ref 11–15)
GFR/BSA.PRED SERPLBLD CYS-BASED-ARV: 65 ML/MIN/1.73M2
GLOBULIN SER CALC-MCNC: 2.5 G/DL (CALC) (ref 1.9–3.7)
GLUCOSE SERPL-MCNC: 124 MG/DL (ref 65–99)
GLUCOSE UR QL STRIP: NEGATIVE
HBA1C MFR BLD: 6.5 % OF TOTAL HGB
HCT VFR BLD AUTO: 45.2 % (ref 38.5–50)
HDLC SERPL-MCNC: 40 MG/DL
HGB BLD-MCNC: 15 G/DL (ref 13.2–17.1)
HGB UR QL STRIP: NEGATIVE
HYALINE CASTS #/AREA URNS LPF: ABNORMAL /LPF
KETONES UR QL STRIP: NEGATIVE
LDLC SERPL CALC-MCNC: 63 MG/DL (CALC)
LEUKOCYTE ESTERASE UR QL STRIP: ABNORMAL
LYMPHOCYTES # BLD AUTO: 3089 CELLS/UL (ref 850–3900)
LYMPHOCYTES NFR BLD AUTO: 39.6 %
MCH RBC QN AUTO: 30.6 PG (ref 27–33)
MCHC RBC AUTO-ENTMCNC: 33.2 G/DL (ref 32–36)
MCV RBC AUTO: 92.2 FL (ref 80–100)
MICROALBUMIN UR-MCNC: 1 MG/DL
MONOCYTES # BLD AUTO: 749 CELLS/UL (ref 200–950)
MONOCYTES NFR BLD AUTO: 9.6 %
NEUTROPHILS # BLD AUTO: 3728 CELLS/UL (ref 1500–7800)
NEUTROPHILS NFR BLD AUTO: 47.8 %
NITRITE UR QL STRIP: NEGATIVE
NONHDLC SERPL-MCNC: 83 MG/DL (CALC)
PH UR STRIP: 6 [PH] (ref 5–8)
PLATELET # BLD AUTO: 230 THOUSAND/UL (ref 140–400)
PMV BLD REES-ECKER: 9.7 FL (ref 7.5–12.5)
POTASSIUM SERPL-SCNC: 4.1 MMOL/L (ref 3.5–5.3)
PROT SERPL-MCNC: 6.5 G/DL (ref 6.1–8.1)
PROT UR QL STRIP: NEGATIVE
PSA SERPL-MCNC: 4.09 NG/ML
RBC # BLD AUTO: 4.9 MILLION/UL (ref 4.2–5.8)
RBC #/AREA URNS HPF: ABNORMAL /HPF
SODIUM SERPL-SCNC: 143 MMOL/L (ref 135–146)
SP GR UR STRIP: 1.02 (ref 1–1.03)
SQUAMOUS #/AREA URNS HPF: ABNORMAL /HPF
TRIGL SERPL-MCNC: 115 MG/DL
URATE SERPL-MCNC: 5.9 MG/DL (ref 4–8)
WBC # BLD AUTO: 7.8 THOUSAND/UL (ref 3.8–10.8)
WBC #/AREA URNS HPF: ABNORMAL /HPF

## 2022-08-22 ENCOUNTER — OFFICE VISIT (OUTPATIENT)
Dept: FAMILY MEDICINE CLINIC | Facility: CLINIC | Age: 67
End: 2022-08-22
Payer: COMMERCIAL

## 2022-08-22 VITALS
WEIGHT: 266 LBS | OXYGEN SATURATION: 93 % | DIASTOLIC BLOOD PRESSURE: 62 MMHG | BODY MASS INDEX: 36.03 KG/M2 | SYSTOLIC BLOOD PRESSURE: 134 MMHG | HEART RATE: 68 BPM | TEMPERATURE: 98.4 F | HEIGHT: 72 IN

## 2022-08-22 DIAGNOSIS — R73.03 PREDIABETES: ICD-10-CM

## 2022-08-22 DIAGNOSIS — J44.9 CHRONIC OBSTRUCTIVE PULMONARY DISEASE, UNSPECIFIED COPD TYPE (HCC): Primary | ICD-10-CM

## 2022-08-22 DIAGNOSIS — L71.9 ROSACEA: ICD-10-CM

## 2022-08-22 DIAGNOSIS — R97.20 ELEVATED PSA: ICD-10-CM

## 2022-08-22 DIAGNOSIS — Z87.891 FORMER SMOKER: ICD-10-CM

## 2022-08-22 DIAGNOSIS — R39.198 DECREASED URINE STREAM: ICD-10-CM

## 2022-08-22 DIAGNOSIS — E78.5 HYPERLIPIDEMIA, UNSPECIFIED HYPERLIPIDEMIA TYPE: ICD-10-CM

## 2022-08-22 DIAGNOSIS — M51.36 DEGENERATION OF INTERVERTEBRAL DISC OF LUMBAR REGION: ICD-10-CM

## 2022-08-22 DIAGNOSIS — D68.59 HYPERCOAGULABLE STATE (HCC): ICD-10-CM

## 2022-08-22 DIAGNOSIS — L20.9 ATOPIC DERMATITIS, UNSPECIFIED TYPE: ICD-10-CM

## 2022-08-22 DIAGNOSIS — I10 ESSENTIAL HYPERTENSION: ICD-10-CM

## 2022-08-22 DIAGNOSIS — M10.9 GOUT, UNSPECIFIED CAUSE, UNSPECIFIED CHRONICITY, UNSPECIFIED SITE: ICD-10-CM

## 2022-08-22 DIAGNOSIS — G89.4 CHRONIC PAIN SYNDROME: ICD-10-CM

## 2022-08-22 DIAGNOSIS — E66.01 CLASS 2 SEVERE OBESITY DUE TO EXCESS CALORIES WITH SERIOUS COMORBIDITY AND BODY MASS INDEX (BMI) OF 36.0 TO 36.9 IN ADULT (HCC): ICD-10-CM

## 2022-08-22 PROBLEM — F11.20 CONTINUOUS OPIOID DEPENDENCE (HCC): Status: RESOLVED | Noted: 2022-02-14 | Resolved: 2022-08-22

## 2022-08-22 PROCEDURE — 3075F SYST BP GE 130 - 139MM HG: CPT | Performed by: FAMILY MEDICINE

## 2022-08-22 PROCEDURE — 3725F SCREEN DEPRESSION PERFORMED: CPT | Performed by: FAMILY MEDICINE

## 2022-08-22 PROCEDURE — 1160F RVW MEDS BY RX/DR IN RCRD: CPT | Performed by: FAMILY MEDICINE

## 2022-08-22 PROCEDURE — 3078F DIAST BP <80 MM HG: CPT | Performed by: FAMILY MEDICINE

## 2022-08-22 PROCEDURE — 99214 OFFICE O/P EST MOD 30 MIN: CPT | Performed by: FAMILY MEDICINE

## 2022-08-22 RX ORDER — METRONIDAZOLE 7.5 MG/G
GEL TOPICAL 2 TIMES DAILY
Qty: 45 G | Refills: 5 | Status: SHIPPED | OUTPATIENT
Start: 2022-08-22 | End: 2022-09-09 | Stop reason: SDUPTHER

## 2022-08-22 RX ORDER — TRIAMCINOLONE ACETONIDE 5 MG/G
CREAM TOPICAL 3 TIMES DAILY
Qty: 30 G | Refills: 5 | Status: SHIPPED | OUTPATIENT
Start: 2022-08-22

## 2022-08-22 RX ORDER — PREGABALIN 150 MG/1
150 CAPSULE ORAL 2 TIMES DAILY
Qty: 180 CAPSULE | Refills: 1 | Status: SHIPPED | OUTPATIENT
Start: 2022-08-22

## 2022-08-22 RX ORDER — TAMSULOSIN HYDROCHLORIDE 0.4 MG/1
0.4 CAPSULE ORAL
Qty: 90 CAPSULE | Refills: 3 | Status: SHIPPED | OUTPATIENT
Start: 2022-08-22

## 2022-08-22 NOTE — PROGRESS NOTES
Depression Screening and Follow-up Plan: Patient was screened for depression during today's encounter  They screened negative with a PHQ-2 score of 0  Assessment/Plan:  Return visit in 6 months with fasting blood work prior to visit  He will do a PSA in 1 month if his number is going up we will get urologic evaluation  Problem List Items Addressed This Visit        Respiratory    Chronic obstructive pulmonary disease (COPD) ([address] ) - Primary     Continue Trelegy Ellipta and albuterol as needed            Cardiovascular and Mediastinum    Essential hypertension     Continue Toprol-XL 50 mg daily            Musculoskeletal and Integument    Degeneration of intervertebral disc of lumbar region    Atopic dermatitis    Relevant Medications    metroNIDAZOLE (METROGEL) 0 75 % gel    triamcinolone (KENALOG) 0 5 % cream       Other    Gout    Hypercoagulable state (HCC)     Continue Xarelto 20 mg daily         Hyperlipidemia     Continue Lipitor 40 mg daily and fenofibrate 134 mg daily         Relevant Orders    CBC and differential    Comprehensive metabolic panel    Lipid panel    Class 2 severe obesity due to excess calories with serious comorbidity and body mass index (BMI) of 36 0 to 36 9 in adult [facility])    Former smoker    Prediabetes    Relevant Orders    Hemoglobin A1C    Chronic pain syndrome    Relevant Medications    pregabalin (LYRICA) 150 mg capsule    Decreased urine stream    Relevant Medications    tamsulosin (FLOMAX) 0 4 mg      Other Visit Diagnoses     Rosacea        Relevant Medications    metroNIDAZOLE (METROGEL) 0 75 % gel    triamcinolone (KENALOG) 0 5 % cream    Elevated PSA        Relevant Orders    PSA Total, Diagnostic            Subjective:      Patient ID: Jimbo Zabala is a 75 y o  male  Patient comes in for checkup  He stop taking oxycodone for his chronic low back pain  He plans to see nor surgery for this    He complains of decreased urinary flow      The following portions of the patient's history were reviewed and updated as appropriate:   Past Medical History:  He has a past medical history of Colon polyp, COPD (chronic obstructive pulmonary disease) ([address] ), H/O colonoscopy (08/22/2011), Hyperlipidemia, Hypertension, Shortness of breath, Sleep apnea, and Synovitis and tenosynovitis  ,  _______________________________________________________________________  Medical Problems:  does not have any pertinent problems on file ,  _______________________________________________________________________  Past Surgical History:   has a past surgical history that includes Knee surgery (Right); Lumbar laminectomy; Hernia repair; Back surgery; and Colonoscopy  ,  _______________________________________________________________________  Family History:  family history includes Alzheimer's disease in his mother; Lung cancer in his father; Multiple sclerosis in his father ,  _______________________________________________________________________  Social History:   reports that he has quit smoking  He has never used smokeless tobacco  He reports that he does not drink alcohol and does not use drugs  ,  _______________________________________________________________________  Allergies:  is allergic to penicillins     _______________________________________________________________________  Current Outpatient Medications   Medication Sig Dispense Refill    albuterol (PROVENTIL HFA,VENTOLIN HFA) 90 mcg/act inhaler Inhale 2 puffs every 4 (four) hours as needed      allopurinol (ZYLOPRIM) 100 mg tablet TAKE ONE TABLET BY MOUTH DAILY 90 tablet 5    atorvastatin (LIPITOR) 40 mg tablet TAKE ONE TABLET BY MOUTH DAILY 90 tablet 3    diazepam (VALIUM) 5 mg tablet Take 1 tablet (5 mg total) by mouth 2 (two) times a day 180 tablet 1    fenofibrate micronized (LOFIBRA) 134 MG capsule TAKE ONE CAPSULE BY MOUTH DAILY 90 capsule 0    metoprolol succinate (TOPROL-XL) 50 mg 24 hr tablet TAKE ONE TABLET BY MOUTH DAILY 90 tablet 3    metroNIDAZOLE (METROGEL) 0 75 % gel Apply topically 2 (two) times a day 45 g 5    Multiple Vitamin (DAILY VALUE MULTIVITAMIN) TABS Take 1 tablet by mouth daily      Omega-3 Fatty Acids (fish oil) 1,000 mg Take 1,000 mg by mouth daily      pregabalin (LYRICA) 150 mg capsule Take 1 capsule (150 mg total) by mouth 2 (two) times a day 180 capsule 1    tamsulosin (FLOMAX) 0 4 mg Take 1 capsule (0 4 mg total) by mouth daily with dinner 90 capsule 3    Trelegy Ellipta 100-62 5-25 MCG/INH inhaler Inhale 1 puff daily      triamcinolone (KENALOG) 0 5 % cream Apply topically 3 (three) times a day 30 g 5    Xarelto 20 MG tablet TAKE ONE TABLET BY MOUTH DAILY 90 tablet 3     No current facility-administered medications for this visit      _______________________________________________________________________  Review of Systems   Constitutional: Negative  Respiratory: Positive for shortness of breath  Cardiovascular: Negative  Genitourinary: Positive for difficulty urinating  Musculoskeletal: Positive for back pain  Objective:  Vitals:    08/22/22 1352   BP: 134/62   Pulse: 68   Temp: 98 4 °F (36 9 °C)   SpO2: 93%   Weight: 121 kg (266 lb)   Height: 6' (1 829 m)     Body mass index is 36 08 kg/m²  Physical Exam  Constitutional:       Appearance: He is well-developed  He is obese  HENT:      Head: Normocephalic and atraumatic  Eyes:      Pupils: Pupils are equal, round, and reactive to light  Cardiovascular:      Rate and Rhythm: Normal rate and regular rhythm  Heart sounds: Normal heart sounds  Pulmonary:      Effort: Pulmonary effort is normal       Breath sounds: Normal breath sounds  Abdominal:      General: Bowel sounds are normal       Palpations: Abdomen is soft  Musculoskeletal:         General: Normal range of motion  Cervical back: Neck supple  Skin:     General: Skin is warm and dry     Neurological:      Mental Status: He is alert and oriented to person, place, and time  Psychiatric:         Mood and Affect: Mood normal          Behavior: Behavior normal          Thought Content:  Thought content normal

## 2022-08-23 RX ORDER — DIAZEPAM 5 MG/1
5 TABLET ORAL 2 TIMES DAILY
Qty: 180 TABLET | Refills: 1 | Status: SHIPPED | OUTPATIENT
Start: 2022-08-23 | End: 2023-02-19

## 2022-09-09 DIAGNOSIS — L71.9 ROSACEA: ICD-10-CM

## 2022-09-09 RX ORDER — METRONIDAZOLE 7.5 MG/G
GEL TOPICAL 2 TIMES DAILY
Qty: 45 G | Refills: 5 | Status: SHIPPED | OUTPATIENT
Start: 2022-09-09

## 2022-09-22 LAB — PSA SERPL-MCNC: 3.69 NG/ML

## 2022-09-29 DIAGNOSIS — E78.2 MIXED HYPERLIPIDEMIA: ICD-10-CM

## 2022-09-29 RX ORDER — FENOFIBRATE 134 MG/1
CAPSULE ORAL
Qty: 90 CAPSULE | Refills: 0 | Status: SHIPPED | OUTPATIENT
Start: 2022-09-29

## 2022-11-21 ENCOUNTER — TELEPHONE (OUTPATIENT)
Dept: FAMILY MEDICINE CLINIC | Facility: CLINIC | Age: 67
End: 2022-11-21

## 2022-11-21 DIAGNOSIS — R39.198 DECREASED URINE STREAM: ICD-10-CM

## 2022-11-21 RX ORDER — TAMSULOSIN HYDROCHLORIDE 0.4 MG/1
0.8 CAPSULE ORAL
Qty: 180 CAPSULE | Refills: 3 | Status: SHIPPED | OUTPATIENT
Start: 2022-11-21

## 2022-11-21 NOTE — TELEPHONE ENCOUNTER
T/c from pt -- states pharmacy will not fill his script for his Tamsulosin being he is taking them 2 times now and the script states to take them once  Pt asking if Dr Kristie Martinez can rewrite the script for 1-2 tablets so his insurance will cover it again       Please advise

## 2022-12-08 ENCOUNTER — TELEPHONE (OUTPATIENT)
Dept: FAMILY MEDICINE CLINIC | Facility: CLINIC | Age: 67
End: 2022-12-08

## 2022-12-08 NOTE — TELEPHONE ENCOUNTER
Pt stopped by the office -     misplaced his future labs (from 08/22/2022)  Pt made an appt with [facility], labs reprinted and handed directly to pt

## 2022-12-26 DIAGNOSIS — E78.2 MIXED HYPERLIPIDEMIA: ICD-10-CM

## 2022-12-26 RX ORDER — FENOFIBRATE 134 MG/1
CAPSULE ORAL
Qty: 90 CAPSULE | Refills: 0 | Status: SHIPPED | OUTPATIENT
Start: 2022-12-26

## 2023-02-17 ENCOUNTER — RA CDI HCC (OUTPATIENT)
Dept: OTHER | Facility: HOSPITAL | Age: 68
End: 2023-02-17

## 2023-02-17 LAB
ALBUMIN SERPL-MCNC: 4.4 G/DL (ref 3.6–5.1)
ALBUMIN/GLOB SERPL: 1.9 (CALC) (ref 1–2.5)
ALP SERPL-CCNC: 66 U/L (ref 35–144)
ALT SERPL-CCNC: 33 U/L (ref 9–46)
AST SERPL-CCNC: 29 U/L (ref 10–35)
BASOPHILS # BLD AUTO: 48 CELLS/UL (ref 0–200)
BASOPHILS NFR BLD AUTO: 0.7 %
BILIRUB SERPL-MCNC: 0.8 MG/DL (ref 0.2–1.2)
BUN SERPL-MCNC: 22 MG/DL (ref 7–25)
BUN/CREAT SERPL: ABNORMAL (CALC) (ref 6–22)
CALCIUM SERPL-MCNC: 10.3 MG/DL (ref 8.6–10.3)
CHLORIDE SERPL-SCNC: 105 MMOL/L (ref 98–110)
CHOLEST SERPL-MCNC: 133 MG/DL
CHOLEST/HDLC SERPL: 3.2 (CALC)
CO2 SERPL-SCNC: 30 MMOL/L (ref 20–32)
CREAT SERPL-MCNC: 1.27 MG/DL (ref 0.7–1.35)
EOSINOPHIL # BLD AUTO: 117 CELLS/UL (ref 15–500)
EOSINOPHIL NFR BLD AUTO: 1.7 %
ERYTHROCYTE [DISTWIDTH] IN BLOOD BY AUTOMATED COUNT: 12.9 % (ref 11–15)
GFR/BSA.PRED SERPLBLD CYS-BASED-ARV: 62 ML/MIN/1.73M2
GLOBULIN SER CALC-MCNC: 2.3 G/DL (CALC) (ref 1.9–3.7)
GLUCOSE SERPL-MCNC: 140 MG/DL (ref 65–99)
HBA1C MFR BLD: 7 % OF TOTAL HGB
HCT VFR BLD AUTO: 46.1 % (ref 38.5–50)
HDLC SERPL-MCNC: 41 MG/DL
HGB BLD-MCNC: 15.7 G/DL (ref 13.2–17.1)
LDLC SERPL CALC-MCNC: 73 MG/DL (CALC)
LYMPHOCYTES # BLD AUTO: 2381 CELLS/UL (ref 850–3900)
LYMPHOCYTES NFR BLD AUTO: 34.5 %
MCH RBC QN AUTO: 30.9 PG (ref 27–33)
MCHC RBC AUTO-ENTMCNC: 34.1 G/DL (ref 32–36)
MCV RBC AUTO: 90.7 FL (ref 80–100)
MONOCYTES # BLD AUTO: 621 CELLS/UL (ref 200–950)
MONOCYTES NFR BLD AUTO: 9 %
NEUTROPHILS # BLD AUTO: 3733 CELLS/UL (ref 1500–7800)
NEUTROPHILS NFR BLD AUTO: 54.1 %
NONHDLC SERPL-MCNC: 92 MG/DL (CALC)
PLATELET # BLD AUTO: 225 THOUSAND/UL (ref 140–400)
PMV BLD REES-ECKER: 9.5 FL (ref 7.5–12.5)
POTASSIUM SERPL-SCNC: 4.2 MMOL/L (ref 3.5–5.3)
PROT SERPL-MCNC: 6.7 G/DL (ref 6.1–8.1)
PSA SERPL-MCNC: 4.44 NG/ML
RBC # BLD AUTO: 5.08 MILLION/UL (ref 4.2–5.8)
SODIUM SERPL-SCNC: 142 MMOL/L (ref 135–146)
TRIGL SERPL-MCNC: 107 MG/DL
WBC # BLD AUTO: 6.9 THOUSAND/UL (ref 3.8–10.8)

## 2023-02-17 NOTE — PROGRESS NOTES
[facility]  coding opportunities          Chart Reviewed number of suggestions sent to Provider: 2   [id number]    [id number]      Patients Insurance     Medicare Insurance: [employer] Advantage

## 2023-02-23 ENCOUNTER — OFFICE VISIT (OUTPATIENT)
Dept: FAMILY MEDICINE CLINIC | Facility: CLINIC | Age: 68
End: 2023-02-23

## 2023-02-23 VITALS
SYSTOLIC BLOOD PRESSURE: 122 MMHG | DIASTOLIC BLOOD PRESSURE: 68 MMHG | WEIGHT: 264 LBS | HEART RATE: 59 BPM | OXYGEN SATURATION: 94 % | TEMPERATURE: 97.7 F | BODY MASS INDEX: 35.76 KG/M2 | HEIGHT: 72 IN

## 2023-02-23 DIAGNOSIS — D68.59 HYPERCOAGULABLE STATE (HCC): ICD-10-CM

## 2023-02-23 DIAGNOSIS — I73.9 PVD (PERIPHERAL VASCULAR DISEASE) (HCC): ICD-10-CM

## 2023-02-23 DIAGNOSIS — E78.2 MIXED HYPERLIPIDEMIA: ICD-10-CM

## 2023-02-23 DIAGNOSIS — E11.9 TYPE 2 DIABETES MELLITUS WITHOUT COMPLICATION, WITHOUT LONG-TERM CURRENT USE OF INSULIN (HCC): ICD-10-CM

## 2023-02-23 DIAGNOSIS — G47.33 OSA (OBSTRUCTIVE SLEEP APNEA): ICD-10-CM

## 2023-02-23 DIAGNOSIS — I10 ESSENTIAL HYPERTENSION: ICD-10-CM

## 2023-02-23 DIAGNOSIS — M10.9 GOUT, UNSPECIFIED CAUSE, UNSPECIFIED CHRONICITY, UNSPECIFIED SITE: ICD-10-CM

## 2023-02-23 DIAGNOSIS — M51.36 DEGENERATION OF INTERVERTEBRAL DISC OF LUMBAR REGION: ICD-10-CM

## 2023-02-23 DIAGNOSIS — Z00.00 MEDICARE ANNUAL WELLNESS VISIT, SUBSEQUENT: Primary | ICD-10-CM

## 2023-02-23 DIAGNOSIS — J44.9 CHRONIC OBSTRUCTIVE PULMONARY DISEASE, UNSPECIFIED COPD TYPE (HCC): ICD-10-CM

## 2023-02-23 DIAGNOSIS — K13.0 LIP LESION: ICD-10-CM

## 2023-02-23 DIAGNOSIS — E66.01 CLASS 2 SEVERE OBESITY DUE TO EXCESS CALORIES WITH SERIOUS COMORBIDITY AND BODY MASS INDEX (BMI) OF 35.0 TO 35.9 IN ADULT (HCC): ICD-10-CM

## 2023-02-23 DIAGNOSIS — E78.5 HYPERLIPIDEMIA, UNSPECIFIED HYPERLIPIDEMIA TYPE: ICD-10-CM

## 2023-02-23 DIAGNOSIS — L71.9 ROSACEA: ICD-10-CM

## 2023-02-23 DIAGNOSIS — R97.20 ELEVATED PSA: ICD-10-CM

## 2023-02-23 PROBLEM — R73.03 PREDIABETES: Status: RESOLVED | Noted: 2021-07-12 | Resolved: 2023-02-23

## 2023-02-23 RX ORDER — DOXYCYCLINE HYCLATE 100 MG/1
100 CAPSULE ORAL EVERY 12 HOURS SCHEDULED
Qty: 20 CAPSULE | Refills: 0 | Status: SHIPPED | OUTPATIENT
Start: 2023-02-23 | End: 2023-03-05

## 2023-02-23 RX ORDER — FENOFIBRATE 134 MG/1
134 CAPSULE ORAL DAILY
Qty: 90 CAPSULE | Refills: 3 | Status: SHIPPED | OUTPATIENT
Start: 2023-02-23

## 2023-02-23 RX ORDER — ATORVASTATIN CALCIUM 40 MG/1
40 TABLET, FILM COATED ORAL DAILY
Qty: 90 TABLET | Refills: 3 | Status: SHIPPED | OUTPATIENT
Start: 2023-02-23

## 2023-02-23 RX ORDER — METOPROLOL SUCCINATE 50 MG/1
50 TABLET, EXTENDED RELEASE ORAL DAILY
Qty: 90 TABLET | Refills: 3 | Status: SHIPPED | OUTPATIENT
Start: 2023-02-23

## 2023-02-23 NOTE — PROGRESS NOTES
Assessment and Plan:   Return visit in 6 months with fasting blood prior to visit  Problem List Items Addressed This Visit        Endocrine    Type 2 diabetes mellitus without complication, without long-term current use of insulin (HCC)     Continue weight loss  Lab Results   Component Value Date    HGBA1C 7 0 (H) 02/16/2023            Relevant Orders    Hemoglobin A1C    Comprehensive metabolic panel    Microalbumin / creatinine urine ratio       Respiratory    Chronic obstructive pulmonary disease (COPD) ([address] )     Continue Trelegy  Follow-up with pulmonology         NAS (obstructive sleep apnea)       Cardiovascular and Mediastinum    Essential hypertension     Continue Toprol-XL 50 mg daily         Relevant Medications    metoprolol succinate (TOPROL-XL) 50 mg 24 hr tablet    PVD (peripheral vascular disease) (HCC)       Musculoskeletal and Integument    Degeneration of intervertebral disc of lumbar region    Rosacea     Continue metronidazole gel  Trial of 2 weeks of doxycycline  Relevant Medications    doxycycline hyclate (VIBRAMYCIN) 100 mg capsule       Other    Gout    Hypercoagulable state (HCC)     Continue Xarelto 20 once daily         Hyperlipidemia     Continue fenofibrate 134 mg daily and Lipitor 40 mg daily         Relevant Medications    atorvastatin (LIPITOR) 40 mg tablet    fenofibrate micronized (LOFIBRA) 134 MG capsule    Other Relevant Orders    CBC and Platelet    Class 2 severe obesity due to excess calories with serious comorbidity and body mass index (BMI) of 35 0 to 35 9 in adult [facility])    Lip lesion    Relevant Orders    Ambulatory Referral to Otolaryngology    Elevated PSA    Relevant Orders    Ambulatory Referral to Urology   Other Visit Diagnoses     Medicare annual wellness visit, subsequent    -  Primary           Preventive health issues were discussed with patient, and age appropriate screening tests were ordered as noted in patient's After Visit Summary    Personalized health advice and appropriate referrals for health education or preventive services given if needed, as noted in patient's After Visit Summary  History of Present Illness:     Patient presents for a Medicare Wellness Visit    Patient comes in for checkup  He complains of redness of his face and pimples on his forehead  He has a skin lesion of his lower lip  Complains of urinary frequency despite taking Flomax  Patient Care Team:  Benja Cazares MD as PCP - General  Gigi Smith MD     Review of Systems:     Review of Systems   Constitutional: Negative  HENT: Negative  Respiratory: Positive for shortness of breath  Cardiovascular: Negative  Gastrointestinal: Negative  Endocrine: Negative  Genitourinary: Positive for frequency  Skin: Positive for rash  Allergic/Immunologic: Negative  Neurological: Negative  Hematological: Negative           Problem List:     Patient Active Problem List   Diagnosis   • Chronic obstructive pulmonary disease (COPD) ([facility] )   • Essential hypertension   • Gout   • Hypercoagulable state ([facility] )   • Hyperlipidemia   • NAS (obstructive sleep apnea)   • Degeneration of intervertebral disc of lumbar region   • Type 2 diabetes mellitus without complication, without long-term current use of insulin ([facility])   • ED (erectile dysfunction)   • Edema   • Mediastinal lymphadenopathy   • Neuropathy   • Ventral hernia   • Class 2 severe obesity due to excess calories with serious comorbidity and body mass index (BMI) of 35 0 to 35 9 in adult [facility])   • Multiple lung nodules on CT   • Former smoker   • Nuclear sclerotic cataract of both eyes   • Lumbar radiculopathy   • Chronic pain syndrome   • Atopic dermatitis   • Decreased urine stream   • PVD (peripheral vascular disease) ([facility])   • Lip lesion   • Rosacea   • Elevated PSA      Past Medical and Surgical History:     Past Medical History:   Diagnosis Date   • Colon polyp    • COPD (chronic obstructive pulmonary disease) [facility])    • H/O colonoscopy 08/22/2011    DESC 8/22/2011   • Hyperlipidemia    • Hypertension    • Shortness of breath    • Sleep apnea    • Synovitis and tenosynovitis      Past Surgical History:   Procedure Laterality Date   • BACK SURGERY     • COLONOSCOPY     • HERNIA REPAIR     • KNEE SURGERY Right    • LUMBAR LAMINECTOMY        Family History:     Family History   Problem Relation Age of Onset   • Alzheimer's disease Mother    • Lung cancer Father    • Multiple sclerosis Father       Social History:     Social History     Socioeconomic History   • Marital status:      Spouse name: None   • Number of children: None   • Years of education: None   • Highest education level: None   Occupational History   • None   Tobacco Use   • Smoking status: Former   • Smokeless tobacco: Never   • Tobacco comments:     stopped 7 years ago   Vaping Use   • Vaping Use: Never used   Substance and Sexual Activity   • Alcohol use: No   • Drug use: No   • Sexual activity: None   Other Topics Concern   • None   Social History Narrative    ALWAYS USES SEAT BELTS        EXERCISES REGULARLY-STATIONARY BIKE    SEEING A DENTIST     Social Determinants of Health     Financial Resource Strain: Low Risk    • Difficulty of Paying Living Expenses: Not hard at all   Food Insecurity: Not on file   Transportation Needs: No Transportation Needs   • Lack of Transportation (Medical): No   • Lack of Transportation (Non-Medical):  No   Physical Activity: Not on file   Stress: Not on file   Social Connections: Not on file   Intimate Partner Violence: Not on file   Housing Stability: Not on file      Medications and Allergies:     Current Outpatient Medications   Medication Sig Dispense Refill   • albuterol (PROVENTIL HFA,VENTOLIN HFA) 90 mcg/act inhaler Inhale 2 puffs every 4 (four) hours as needed     • allopurinol (ZYLOPRIM) 100 mg tablet TAKE ONE TABLET BY MOUTH DAILY 90 tablet 5   • atorvastatin (LIPITOR) 40 mg tablet Take 1 tablet (40 mg total) by mouth daily 90 tablet 3   • diazepam (VALIUM) 5 mg tablet TAKE 1 TABLET (5 MG TOTAL) BY MOUTH 2 (TWO) TIMES A  tablet 1   • doxycycline hyclate (VIBRAMYCIN) 100 mg capsule Take 1 capsule (100 mg total) by mouth every 12 (twelve) hours for 10 days 20 capsule 0   • fenofibrate micronized (LOFIBRA) 134 MG capsule Take 1 capsule (134 mg total) by mouth daily 90 capsule 3   • metoprolol succinate (TOPROL-XL) 50 mg 24 hr tablet Take 1 tablet (50 mg total) by mouth daily 90 tablet 3   • metroNIDAZOLE (METROGEL) 0 75 % gel Apply topically 2 (two) times a day 45 g 5   • Multiple Vitamin (DAILY VALUE MULTIVITAMIN) TABS Take 1 tablet by mouth daily     • Omega-3 Fatty Acids (fish oil) 1,000 mg Take 1,000 mg by mouth daily     • pregabalin (LYRICA) 150 mg capsule Take 1 capsule (150 mg total) by mouth 2 (two) times a day 180 capsule 1   • tamsulosin (FLOMAX) 0 4 mg Take 2 capsules (0 8 mg total) by mouth daily with dinner 180 capsule 3   • Trelegy Ellipta 100-62 5-25 MCG/INH inhaler Inhale 1 puff daily     • triamcinolone (KENALOG) 0 5 % cream Apply topically 3 (three) times a day 30 g 5   • Xarelto 20 MG tablet TAKE ONE TABLET BY MOUTH DAILY 90 tablet 3     No current facility-administered medications for this visit       Allergies   Allergen Reactions   • Penicillins Hives      Immunizations:     Immunization History   Administered Date(s) Administered   • COVID-19 PFIZER VACCINE 0 3 ML IM 03/02/2021, 03/23/2021, 10/21/2021   • COVID-19 Pfizer vac (Umair-sucrose, gray cap) 12 yr+ IM 04/01/2022   • INFLUENZA 10/31/2007, 10/29/2008, 10/28/2009, 11/03/2014, 09/18/2015, 09/15/2016, 09/30/2017, 09/28/2018, 09/09/2020, 09/20/2021   • Influenza, injectable, quadrivalent, preservative free 0 5 mL 09/15/2018   • Influenza, seasonal, injectable 09/18/2015, 09/30/2017   • Pneumococcal Conjugate 13-Valent 09/21/2015, 09/15/2016   • Pneumococcal Polysaccharide PPV23 12/12/2017   • Tdap 09/09/2020   • Zoster 02/29/2016 • Zoster Vaccine Recombinant 09/13/2019, 11/18/2019   • influenza, trivalent, adjuvanted 09/13/2019, 09/09/2020      Health Maintenance:         Topic Date Due   • Colorectal Cancer Screening  06/28/2029   • Hepatitis C Screening  Discontinued         Topic Date Due   • COVID-19 Vaccine (5 - Booster for Pfizer series) 05/27/2022   • Influenza Vaccine (1) 09/01/2022   • Pneumococcal Vaccine: 65+ Years (3 - PPSV23 if available, else PCV20) 12/12/2022      Medicare Screening Tests and Risk Assessments:     Yury Hernandez is here for his Subsequent Wellness visit  Last Medicare Wellness visit information reviewed, patient interviewed and updates made to the record today  Health Risk Assessment:   Patient rates overall health as fair  Patient feels that their physical health rating is slightly worse  Patient is satisfied with their life  Eyesight was rated as same  Hearing was rated as same  Patient feels that their emotional and mental health rating is same  Patients states they are never, rarely angry  Patient states they are always unusually tired/fatigued  Pain experienced in the last 7 days has been none  Patient states that he has experienced no weight loss or gain in last 6 months  Depression Screening:   PHQ-2 Score: 2      Fall Risk Screening: In the past year, patient has experienced: no history of falling in past year      Home Safety:  Patient has trouble with stairs inside or outside of their home  Patient has working smoke alarms and has working carbon monoxide detector  Home safety hazards include: none  Nutrition:   Current diet is Regular  Medications:   Patient is currently taking over-the-counter supplements  OTC medications include: see medication list  Patient is able to manage medications       Activities of Daily Living (ADLs)/Instrumental Activities of Daily Living (IADLs):   Walk and transfer into and out of bed and chair?: Yes  Dress and groom yourself?: Yes    Bathe or shower yourself?: Yes    Feed yourself? Yes  Do your laundry/housekeeping?: Yes  Manage your money, pay your bills and track your expenses?: Yes  Make your own meals?: Yes    Do your own shopping?: Yes    Previous Hospitalizations:   Any hospitalizations or ED visits within the last 12 months?: No      Advance Care Planning:   Living will: Yes    Durable POA for healthcare: Yes    Advanced directive: Yes    Advanced directive counseling given: Yes    Five wishes given: No    End of Life Decisions reviewed with patient: Yes    Provider agrees with end of life decisions: Yes      PREVENTIVE SCREENINGS      Cardiovascular Screening:    General: Screening Not Indicated and History Lipid Disorder      Diabetes Screening:     General: Screening Current      Colorectal Cancer Screening:     General: Screening Current      Prostate Cancer Screening:    General: Screening Current      Osteoporosis Screening:    General: Screening Not Indicated      Abdominal Aortic Aneurysm (AAA) Screening:    Risk factors include: age between 73-67 yo and tobacco use        General: History AAA      Lung Cancer Screening:     General: Screening Not Indicated      Hepatitis C Screening:    General: Screening Current    Screening, Brief Intervention, and Referral to Treatment (SBIRT)    Screening  Typical number of drinks in a day: 0  Typical number of drinks in a week: 0  Interpretation: Low risk drinking behavior  Single Item Drug Screening:  How often have you used an illegal drug (including marijuana) or a prescription medication for non-medical reasons in the past year? never    Single Item Drug Screen Score: 0  Interpretation: Negative screen for possible drug use disorder    Brief Intervention  Alcohol & drug use screenings were reviewed  No concerns regarding substance use disorder identified  Other Counseling Topics:   Car/seat belt/driving safety  No results found       Physical Exam:     /68 (BP Location: Left arm, Patient Position: Sitting, Cuff Size: Large)   Pulse 59   Temp 97 7 °F (36 5 °C)   Ht 6' (1 829 m)   Wt 120 kg (264 lb)   SpO2 94%   BMI 35 80 kg/m²     Physical Exam  Vitals and nursing note reviewed  Constitutional:       Appearance: He is well-developed  He is obese  HENT:      Head: Normocephalic and atraumatic  Eyes:      Conjunctiva/sclera: Conjunctivae normal    Cardiovascular:      Rate and Rhythm: Normal rate and regular rhythm  Pulses: no weak pulses          Dorsalis pedis pulses are 1+ on the right side and 1+ on the left side  Posterior tibial pulses are 1+ on the right side and 1+ on the left side  Heart sounds: Normal heart sounds  Pulmonary:      Effort: Pulmonary effort is normal       Breath sounds: Normal breath sounds  Musculoskeletal:         General: No swelling  Cervical back: Neck supple  Feet:      Right foot:      Skin integrity: No ulcer, skin breakdown, erythema, warmth, callus or dry skin  Left foot:      Skin integrity: No ulcer, skin breakdown, erythema, warmth, callus or dry skin  Skin:     Comments: Red macular rash of forehead and face  Multiple pustules of forehead  Cystic lesion inside lower lip  Neurological:      Mental Status: He is alert  Psychiatric:         Mood and Affect: Mood normal          Behavior: Behavior normal       Diabetic Foot Exam    Patient's shoes and socks removed  Right Foot/Ankle   Right Foot Inspection  Skin Exam: skin normal and skin intact  No dry skin, no warmth, no callus, no erythema, no maceration, no abnormal color, no pre-ulcer, no ulcer and no callus  Toe Exam: ROM and strength within normal limits  Sensory   Vibration: diminished  Proprioception: diminished  Monofilament testing: diminished    Vascular  The right DP pulse is 1+  The right PT pulse is 1+  Left Foot/Ankle  Left Foot Inspection  Skin Exam: skin normal and skin intact   No dry skin, no warmth, no erythema, no maceration, normal color, no pre-ulcer, no ulcer and no callus  Toe Exam: ROM and strength within normal limits  Sensory   Vibration: diminished  Proprioception: diminished  Monofilament testing: diminished    Vascular  The left DP pulse is 1+  The left PT pulse is 1+       Assign Risk Category  No deformity present  Loss of protective sensation  No weak pulses  Risk: 1      Yannick Gonzalez MD

## 2023-02-23 NOTE — PATIENT INSTRUCTIONS
Medicare Preventive Visit Patient Instructions  Thank you for completing your Welcome to Medicare Visit or Medicare Annual Wellness Visit today  Your next wellness visit will be due in one year (2/24/2024)  The screening/preventive services that you may require over the next 5-10 years are detailed below  Some tests may not apply to you based off risk factors and/or age  Screening tests ordered at today's visit but not completed yet may show as past due  Also, please note that scanned in results may not display below  Preventive Screenings:  Service Recommendations Previous Testing/Comments   Colorectal Cancer Screening  · Colonoscopy    · Fecal Occult Blood Test (FOBT)/Fecal Immunochemical Test (FIT)  · Fecal DNA/Cologuard Test  · Flexible Sigmoidoscopy Age: 39-70 years old   Colonoscopy: every 10 years (May be performed more frequently if at higher risk)  OR  FOBT/FIT: every 1 year  OR  Cologuard: every 3 years  OR  Sigmoidoscopy: every 5 years  Screening may be recommended earlier than age 39 if at higher risk for colorectal cancer  Also, an individualized decision between you and your healthcare provider will decide whether screening between the ages of 74-80 would be appropriate   Colonoscopy: 06/28/2019  FOBT/FIT: Not on file  Cologuard: Not on file  Sigmoidoscopy: Not on file    Screening Current     Prostate Cancer Screening Individualized decision between patient and health care provider in men between ages of 53-78   Medicare will cover every 12 months beginning on the day after your 50th birthday PSA: 4 44 ng/mL     Screening Current     Hepatitis C Screening Once for adults born between [facility]  More frequently in patients at high risk for Hepatitis C Hep C Antibody: Not on file        Diabetes Screening 1-2 times per year if you're at risk for diabetes or have pre-diabetes Fasting glucose: No results in last 5 years (No results in last 5 years)  A1C: 7 0 % of total Hgb (2/16/2023)  Screening Current   Cholesterol Screening Once every 5 years if you don't have a lipid disorder  May order more often based on risk factors  Lipid panel: 02/16/2023  Screening Not Indicated  History Lipid Disorder      Other Preventive Screenings Covered by Medicare:  1  Abdominal Aortic Aneurysm (AAA) Screening: covered once if your at risk  You're considered to be at risk if you have a family history of AAA or a male between the age of 73-68 who smoking at least 100 cigarettes in your lifetime  2  Lung Cancer Screening: covers low dose CT scan once per year if you meet all of the following conditions: (1) Age 50-69; (2) No signs or symptoms of lung cancer; (3) Current smoker or have quit smoking within the last 15 years; (4) You have a tobacco smoking history of at least 20 pack years (packs per day x number of years you smoked); (5) You get a written order from a healthcare provider  3  Glaucoma Screening: covered annually if you're considered high risk: (1) You have diabetes OR (2) Family history of glaucoma OR (3)  aged 48 and older OR (3)  American aged 72 and older  3  Osteoporosis Screening: covered every 2 years if you meet one of the following conditions: (1) Have a vertebral abnormality; (2) On glucocorticoid therapy for more than 3 months; (3) Have primary hyperparathyroidism; (4) On osteoporosis medications and need to assess response to drug therapy  5  HIV Screening: covered annually if you're between the age of 12-76  Also covered annually if you are younger than 13 and older than 72 with risk factors for HIV infection  For pregnant patients, it is covered up to 3 times per pregnancy      Immunizations:  Immunization Recommendations   Influenza Vaccine Annual influenza vaccination during flu season is recommended for all persons aged >= 6 months who do not have contraindications   Pneumococcal Vaccine   * Pneumococcal conjugate vaccine = PCV13 (Prevnar 13), PCV15 (Vaxneuvance), PCV20 (Prevnar 20)  * Pneumococcal polysaccharide vaccine = PPSV23 (Pneumovax) Adults 2364 years old: 1-3 doses may be recommended based on certain risk factors  Adults 72 years old: 1-2 doses may be recommended based off what pneumonia vaccine you previously received   Hepatitis B Vaccine 3 dose series if at intermediate or high risk (ex: diabetes, end stage renal disease, liver disease)   Tetanus (Td) Vaccine - COST NOT COVERED BY MEDICARE PART B Following completion of primary series, a booster dose should be given every 10 years to maintain immunity against tetanus  Td may also be given as tetanus wound prophylaxis  Tdap Vaccine - COST NOT COVERED BY MEDICARE PART B Recommended at least once for all adults  For pregnant patients, recommended with each pregnancy  Shingles Vaccine (Shingrix) - COST NOT COVERED BY MEDICARE PART B  2 shot series recommended in those aged 48 and above     Health Maintenance Due:      Topic Date Due   • Colorectal Cancer Screening  06/28/2029   • Hepatitis C Screening  Discontinued     Immunizations Due:      Topic Date Due   • COVID-19 Vaccine (5 - Booster for Pfizer series) 05/27/2022   • Influenza Vaccine (1) 09/01/2022   • Pneumococcal Vaccine: 65+ Years (3 - PPSV23 if available, else PCV20) 12/12/2022     Advance Directives   What are advance directives? Advance directives are legal documents that state your wishes and plans for medical care  These plans are made ahead of time in case you lose your ability to make decisions for yourself  Advance directives can apply to any medical decision, such as the treatments you want, and if you want to donate organs  What are the types of advance directives? There are many types of advance directives, and each state has rules about how to use them  You may choose a combination of any of the following:  · Living will: This is a written record of the treatment you want   You can also choose which treatments you do not want, which to limit, and which to stop at a certain time  This includes surgery, medicine, IV fluid, and tube feedings  · Durable power of  for healthcare [facility]): This is a written record that states who you want to make healthcare choices for you when you are unable to make them for yourself  This person, called a proxy, is usually a family member or a friend  You may choose more than 1 proxy  · Do not resuscitate (DNR) order:  A DNR order is used in case your heart stops beating or you stop breathing  It is a request not to have certain forms of treatment, such as CPR  A DNR order may be included in other types of advance directives  · Medical directive: This covers the care that you want if you are in a coma, near death, or unable to make decisions for yourself  You can list the treatments you want for each condition  Treatment may include pain medicine, surgery, blood transfusions, dialysis, IV or tube feedings, and a ventilator (breathing machine)  · Values history: This document has questions about your views, beliefs, and how you feel and think about life  This information can help others choose the care that you would choose  Why are advance directives important? An advance directive helps you control your care  Although spoken wishes may be used, it is better to have your wishes written down  Spoken wishes can be misunderstood, or not followed  Treatments may be given even if you do not want them  An advance directive may make it easier for your family to make difficult choices about your care  Weight Management   Why it is important to manage your weight:  Being overweight increases your risk of health conditions such as heart disease, high blood pressure, type 2 diabetes, and certain types of cancer  It can also increase your risk for osteoarthritis, sleep apnea, and other respiratory problems  Aim for a slow, steady weight loss   Even a small amount of weight loss can lower your risk of health problems  How to lose weight safely:  A safe and healthy way to lose weight is to eat fewer calories and get regular exercise  You can lose up about 1 pound a week by decreasing the number of calories you eat by 500 calories each day  Healthy meal plan for weight management:  A healthy meal plan includes a variety of foods, contains fewer calories, and helps you stay healthy  A healthy meal plan includes the following:  · Eat whole-grain foods more often  A healthy meal plan should contain fiber  Fiber is the part of grains, fruits, and vegetables that is not broken down by your body  Whole-grain foods are healthy and provide extra fiber in your diet  Some examples of whole-grain foods are whole-wheat breads and pastas, oatmeal, brown rice, and bulgur  · Eat a variety of vegetables every day  Include dark, leafy greens such as spinach, kale, janna greens, and mustard greens  Eat yellow and orange vegetables such as carrots, sweet potatoes, and winter squash  · Eat a variety of fruits every day  Choose fresh or canned fruit (canned in its own juice or light syrup) instead of juice  Fruit juice has very little or no fiber  · Eat low-fat dairy foods  Drink fat-free (skim) milk or 1% milk  Eat fat-free yogurt and low-fat cottage cheese  Try low-fat cheeses such as mozzarella and other reduced-fat cheeses  · Choose meat and other protein foods that are low in fat  Choose beans or other legumes such as split peas or lentils  Choose fish, skinless poultry (chicken or turkey), or lean cuts of red meat (beef or pork)  Before you cook meat or poultry, cut off any visible fat  · Use less fat and oil  Try baking foods instead of frying them  Add less fat, such as margarine, sour cream, regular salad dressing and mayonnaise to foods  Eat fewer high-fat foods  Some examples of high-fat foods include french fries, doughnuts, ice cream, and cakes  · Eat fewer sweets    Limit foods and drinks that are high in sugar  This includes candy, cookies, regular soda, and sweetened drinks  Exercise:  Exercise at least 30 minutes per day on most days of the week  Some examples of exercise include walking, biking, dancing, and swimming  You can also fit in more physical activity by taking the stairs instead of the elevator or parking farther away from stores  Ask your healthcare provider about the best exercise plan for you  © Copyright [employer] 2018 Information is for End User's use only and may not be sold, redistributed or otherwise used for commercial purposes  All illustrations and images included in CareNotes® are the copyrighted property of [employer]  or [facility] Preventive Visit Patient Instructions  Thank you for completing your Welcome to Medicare Visit or Medicare Annual Wellness Visit today  Your next wellness visit will be due in one year (2/24/2024)  The screening/preventive services that you may require over the next 5-10 years are detailed below  Some tests may not apply to you based off risk factors and/or age  Screening tests ordered at today's visit but not completed yet may show as past due  Also, please note that scanned in results may not display below  Preventive Screenings:  Service Recommendations Previous Testing/Comments   Colorectal Cancer Screening  · Colonoscopy    · Fecal Occult Blood Test (FOBT)/Fecal Immunochemical Test (FIT)  · Fecal DNA/Cologuard Test  · Flexible Sigmoidoscopy Age: 39-70 years old   Colonoscopy: every 10 years (May be performed more frequently if at higher risk)  OR  FOBT/FIT: every 1 year  OR  Cologuard: every 3 years  OR  Sigmoidoscopy: every 5 years  Screening may be recommended earlier than age 39 if at higher risk for colorectal cancer  Also, an individualized decision between you and your healthcare provider will decide whether screening between the ages of 74-80 would be appropriate   Colonoscopy: 06/28/2019  FOBT/FIT: Not on file  Cologuard: Not on file  Sigmoidoscopy: Not on file    Screening Current     Prostate Cancer Screening Individualized decision between patient and health care provider in men between ages of 53-78   Medicare will cover every 12 months beginning on the day after your 50th birthday PSA: 4 44 ng/mL     Screening Current     Hepatitis C Screening Once for adults born between [facility]  More frequently in patients at high risk for Hepatitis C Hep C Antibody: Not on file        Diabetes Screening 1-2 times per year if you're at risk for diabetes or have pre-diabetes Fasting glucose: No results in last 5 years (No results in last 5 years)  A1C: 7 0 % of total Hgb (2/16/2023)  Screening Current   Cholesterol Screening Once every 5 years if you don't have a lipid disorder  May order more often based on risk factors  Lipid panel: 02/16/2023  Screening Not Indicated  History Lipid Disorder      Other Preventive Screenings Covered by Medicare:  6  Abdominal Aortic Aneurysm (AAA) Screening: covered once if your at risk  You're considered to be at risk if you have a family history of AAA or a male between the age of 73-68 who smoking at least 100 cigarettes in your lifetime  7  Lung Cancer Screening: covers low dose CT scan once per year if you meet all of the following conditions: (1) Age 50-69; (2) No signs or symptoms of lung cancer; (3) Current smoker or have quit smoking within the last 15 years; (4) You have a tobacco smoking history of at least 20 pack years (packs per day x number of years you smoked); (5) You get a written order from a healthcare provider  8  Glaucoma Screening: covered annually if you're considered high risk: (1) You have diabetes OR (2) Family history of glaucoma OR (3)  aged 48 and older OR (3)  American aged 72 and older  5   Osteoporosis Screening: covered every 2 years if you meet one of the following conditions: (1) Have a vertebral abnormality; (2) On glucocorticoid therapy for more than 3 months; (3) Have primary hyperparathyroidism; (4) On osteoporosis medications and need to assess response to drug therapy  10  HIV Screening: covered annually if you're between the age of 12-76  Also covered annually if you are younger than 13 and older than 72 with risk factors for HIV infection  For pregnant patients, it is covered up to 3 times per pregnancy  Immunizations:  Immunization Recommendations   Influenza Vaccine Annual influenza vaccination during flu season is recommended for all persons aged >= 6 months who do not have contraindications   Pneumococcal Vaccine   * Pneumococcal conjugate vaccine = PCV13 (Prevnar 13), PCV15 (Vaxneuvance), PCV20 (Prevnar 20)  * Pneumococcal polysaccharide vaccine = PPSV23 (Pneumovax) Adults 25-60 years old: 1-3 doses may be recommended based on certain risk factors  Adults 72 years old: 1-2 doses may be recommended based off what pneumonia vaccine you previously received   Hepatitis B Vaccine 3 dose series if at intermediate or high risk (ex: diabetes, end stage renal disease, liver disease)   Tetanus (Td) Vaccine - COST NOT COVERED BY MEDICARE PART B Following completion of primary series, a booster dose should be given every 10 years to maintain immunity against tetanus  Td may also be given as tetanus wound prophylaxis  Tdap Vaccine - COST NOT COVERED BY MEDICARE PART B Recommended at least once for all adults  For pregnant patients, recommended with each pregnancy     Shingles Vaccine (Shingrix) - COST NOT COVERED BY MEDICARE PART B  2 shot series recommended in those aged 48 and above     Health Maintenance Due:      Topic Date Due   • Colorectal Cancer Screening  06/28/2029   • Hepatitis C Screening  Discontinued     Immunizations Due:      Topic Date Due   • COVID-19 Vaccine (5 - Booster for Pfizer series) 05/27/2022   • Influenza Vaccine (1) 09/01/2022   • Pneumococcal Vaccine: 65+ Years (3 - PPSV23 if available, else PCV20) 12/12/2022     Advance Directives   What are advance directives? Advance directives are legal documents that state your wishes and plans for medical care  These plans are made ahead of time in case you lose your ability to make decisions for yourself  Advance directives can apply to any medical decision, such as the treatments you want, and if you want to donate organs  What are the types of advance directives? There are many types of advance directives, and each state has rules about how to use them  You may choose a combination of any of the following:  · Living will: This is a written record of the treatment you want  You can also choose which treatments you do not want, which to limit, and which to stop at a certain time  This includes surgery, medicine, IV fluid, and tube feedings  · Durable power of  for healthcare [facility]): This is a written record that states who you want to make healthcare choices for you when you are unable to make them for yourself  This person, called a proxy, is usually a family member or a friend  You may choose more than 1 proxy  · Do not resuscitate (DNR) order:  A DNR order is used in case your heart stops beating or you stop breathing  It is a request not to have certain forms of treatment, such as CPR  A DNR order may be included in other types of advance directives  · Medical directive: This covers the care that you want if you are in a coma, near death, or unable to make decisions for yourself  You can list the treatments you want for each condition  Treatment may include pain medicine, surgery, blood transfusions, dialysis, IV or tube feedings, and a ventilator (breathing machine)  · Values history: This document has questions about your views, beliefs, and how you feel and think about life  This information can help others choose the care that you would choose  Why are advance directives important? An advance directive helps you control your care  Although spoken wishes may be used, it is better to have your wishes written down  Spoken wishes can be misunderstood, or not followed  Treatments may be given even if you do not want them  An advance directive may make it easier for your family to make difficult choices about your care  Weight Management   Why it is important to manage your weight:  Being overweight increases your risk of health conditions such as heart disease, high blood pressure, type 2 diabetes, and certain types of cancer  It can also increase your risk for osteoarthritis, sleep apnea, and other respiratory problems  Aim for a slow, steady weight loss  Even a small amount of weight loss can lower your risk of health problems  How to lose weight safely:  A safe and healthy way to lose weight is to eat fewer calories and get regular exercise  You can lose up about 1 pound a week by decreasing the number of calories you eat by 500 calories each day  Healthy meal plan for weight management:  A healthy meal plan includes a variety of foods, contains fewer calories, and helps you stay healthy  A healthy meal plan includes the following:  · Eat whole-grain foods more often  A healthy meal plan should contain fiber  Fiber is the part of grains, fruits, and vegetables that is not broken down by your body  Whole-grain foods are healthy and provide extra fiber in your diet  Some examples of whole-grain foods are whole-wheat breads and pastas, oatmeal, brown rice, and bulgur  · Eat a variety of vegetables every day  Include dark, leafy greens such as spinach, kale, janna greens, and mustard greens  Eat yellow and orange vegetables such as carrots, sweet potatoes, and winter squash  · Eat a variety of fruits every day  Choose fresh or canned fruit (canned in its own juice or light syrup) instead of juice  Fruit juice has very little or no fiber  · Eat low-fat dairy foods  Drink fat-free (skim) milk or 1% milk   Eat fat-free yogurt and low-fat cottage cheese  Try low-fat cheeses such as mozzarella and other reduced-fat cheeses  · Choose meat and other protein foods that are low in fat  Choose beans or other legumes such as split peas or lentils  Choose fish, skinless poultry (chicken or turkey), or lean cuts of red meat (beef or pork)  Before you cook meat or poultry, cut off any visible fat  · Use less fat and oil  Try baking foods instead of frying them  Add less fat, such as margarine, sour cream, regular salad dressing and mayonnaise to foods  Eat fewer high-fat foods  Some examples of high-fat foods include french fries, doughnuts, ice cream, and cakes  · Eat fewer sweets  Limit foods and drinks that are high in sugar  This includes candy, cookies, regular soda, and sweetened drinks  Exercise:  Exercise at least 30 minutes per day on most days of the week  Some examples of exercise include walking, biking, dancing, and swimming  You can also fit in more physical activity by taking the stairs instead of the elevator or parking farther away from stores  Ask your healthcare provider about the best exercise plan for you  © Copyright [employer] 2018 Information is for End User's use only and may not be sold, redistributed or otherwise used for commercial purposes   All illustrations and images included in CareNotes® are the copyrighted property of [employer] M [employer]  or [address] [place] [address]

## 2023-02-27 ENCOUNTER — TELEPHONE (OUTPATIENT)
Dept: UROLOGY | Facility: MEDICAL CENTER | Age: 68
End: 2023-02-27

## 2023-02-27 DIAGNOSIS — N52.9 ERECTILE DYSFUNCTION, UNSPECIFIED ERECTILE DYSFUNCTION TYPE: Primary | ICD-10-CM

## 2023-02-27 RX ORDER — TADALAFIL 20 MG/1
20 TABLET ORAL DAILY PRN
Qty: 10 TABLET | Refills: 10 | Status: SHIPPED | OUTPATIENT
Start: 2023-02-27

## 2023-02-28 DIAGNOSIS — G89.4 CHRONIC PAIN SYNDROME: ICD-10-CM

## 2023-02-28 RX ORDER — PREGABALIN 150 MG/1
150 CAPSULE ORAL 2 TIMES DAILY
Qty: 180 CAPSULE | Refills: 1 | Status: SHIPPED | OUTPATIENT
Start: 2023-02-28

## 2023-02-28 NOTE — PROGRESS NOTES
3/1/2023      Chief Complaint   Patient presents with   • Elevated PSA         Assessment and Plan    75 y o  male -- New patient    1  Elevated PSA, rising  - PSA (2/16/23) 4 44, previously 3 69 (9/22/22)  - WILLIS today limited   - Discussed rising PSA with patient and recommendations for repeat PSA and MRI of prostate for prostate biopsy planning  - Return to review  - Call with any questions or concerns in the meantime  - All questions answered; patient understands and agrees with plan     2  BPH with LUTS  - Continue Flomax  - Start finasteride  Medication and side effects reviewed  Patient aware this will decrease PSA level      History of Present Illness  Kalpana Leija is a 75 y o  male new patient here for evaluation of elevated PSA  Patient has had rising PSA for the past 4 years  Most recent PSA 4 44, previously 3 69, 3 0, 2 4, and 2 1  Denies prior prostate biopsy or MRI of prostate  Does have urinary symptoms such as hesitancy, weakened stream, and post void dribbling  Currently taking Flomax 2 times a day  Denies finasteride use  Denies family history of prostate cancer or other  abnormalities  Denies seeing urology in the past      Component Ref Range & Units 2/16/23  7:03 AM 9/22/22  7:02 AM 8/15/22  7:04 AM 7/6/21  7:07 AM 6/18/20  7:18 AM 6/21/19 12:34 PM   Prostate Specific Antigen Total < OR = 4 00 ng/mL 4 44 High   3 69 CM  4 09 High  CM  3 0 R, CM  2 4 R, CM  2 1          Review of Systems   Constitutional: Negative for activity change, appetite change, chills and fever  HENT: Negative for congestion and trouble swallowing  Respiratory: Negative for cough and shortness of breath  Cardiovascular: Negative for chest pain, palpitations and leg swelling  Gastrointestinal: Negative for abdominal pain, constipation, diarrhea, nausea and vomiting  Genitourinary: Negative for difficulty urinating, dysuria, flank pain, frequency, hematuria and urgency     Musculoskeletal: Negative for back pain and gait problem  Skin: Negative for wound  Allergic/Immunologic: Negative for immunocompromised state  Neurological: Negative for dizziness and syncope  Hematological: Does not bruise/bleed easily  Psychiatric/Behavioral: Negative for confusion  All other systems reviewed and are negative  Vitals  Vitals:    03/01/23 1343   BP: 124/68   Pulse: 64   SpO2: 96%   Weight: 118 kg (261 lb)   Height: 6' (1 829 m)       Physical Exam  Constitutional:       General: He is not in acute distress  Appearance: Normal appearance  He is not ill-appearing, toxic-appearing or diaphoretic  HENT:      Head: Normocephalic  Nose: No congestion  Eyes:      General: No scleral icterus  Right eye: No discharge  Left eye: No discharge  Conjunctiva/sclera: Conjunctivae normal       Pupils: Pupils are equal, round, and reactive to light  Pulmonary:      Effort: Pulmonary effort is normal    Genitourinary:     Comments: Prostate exam limited  No palpable nodules  Musculoskeletal:      Cervical back: Normal range of motion  Skin:     General: Skin is warm and dry  Coloration: Skin is not jaundiced or pale  Findings: No bruising, erythema, lesion or rash  Neurological:      General: No focal deficit present  Mental Status: He is alert and oriented to person, place, and time  Mental status is at baseline  Gait: Gait normal    Psychiatric:         Mood and Affect: Mood normal          Behavior: Behavior normal          Thought Content:  Thought content normal          Judgment: Judgment normal            Past History  Past Medical History:   Diagnosis Date   • Colon polyp    • COPD (chronic obstructive pulmonary disease) (HCC)    • Elevated PSA    • H/O colonoscopy 08/22/2011    DESC 8/22/2011   • Hyperlipidemia    • Hypertension    • Shortness of breath    • Sleep apnea    • Synovitis and tenosynovitis      Social History     Socioeconomic History   • Marital status:      Spouse name: None   • Number of children: None   • Years of education: None   • Highest education level: None   Occupational History   • None   Tobacco Use   • Smoking status: Former     Passive exposure: Past   • Smokeless tobacco: Never   • Tobacco comments:     stopped 7 years ago   Vaping Use   • Vaping Use: Never used   Substance and Sexual Activity   • Alcohol use: No   • Drug use: No   • Sexual activity: Not Currently   Other Topics Concern   • None   Social History Narrative    ALWAYS USES SEAT BELTS        EXERCISES REGULARLY-STATIONARY BIKE    SEEING A DENTIST     Social Determinants of Health     Financial Resource Strain: Low Risk    • Difficulty of Paying Living Expenses: Not hard at all   Food Insecurity: Not on file   Transportation Needs: No Transportation Needs   • Lack of Transportation (Medical): No   • Lack of Transportation (Non-Medical):  No   Physical Activity: Not on file   Stress: Not on file   Social Connections: Not on file   Intimate Partner Violence: Not on file   Housing Stability: Not on file     Social History     Tobacco Use   Smoking Status Former   • Passive exposure: Past   Smokeless Tobacco Never   Tobacco Comments    stopped 7 years ago     Family History   Problem Relation Age of Onset   • Alzheimer's disease Mother    • Lung cancer Father    • Multiple sclerosis Father        The following portions of the patient's history were reviewed and updated as appropriate: allergies, current medications, past medical history, past social history, past surgical history and problem list     Results  No results found for this or any previous visit (from the past 1 hour(s)) ]  Lab Results   Component Value Date    PSA 4 44 (H) 02/16/2023    PSA 3 69 09/22/2022    PSA 4 09 (H) 08/15/2022    PSA 3 0 07/06/2021     Lab Results   Component Value Date    CALCIUM 10 3 02/16/2023    K 4 2 02/16/2023    CO2 30 02/16/2023     02/16/2023    BUN 22 02/16/2023 CREATININE 1 27 02/16/2023     Lab Results   Component Value Date    WBC 6 9 02/16/2023    HGB 15 7 02/16/2023    HCT 46 1 02/16/2023    MCV 90 7 02/16/2023     02/16/2023       Jose Garcia PA-C

## 2023-02-28 NOTE — TELEPHONE ENCOUNTER
Please Triage -   New Patient- Clare     What is the reason for the patients appointment? Patient called stating he was referred to see Urology for elevated psa 4 44   Patient can be reached at [phone]     Imaging/Lab Results:      Do we accept the patient's insurance or is the patient Self-Pay? Provider & Plan: [facility]   Member ID#:       Has the patient had any previous urologist(s)?no        Have patient records been requested?in epic        Has the patient had any outside testing done?in [facility]       Does the patient have a personal history of cancer?no       Patient can be reached at :
Pt called and confirmed appointment for tomorrow at 2 pm 
Scheduled appt for 3/1 at 2:30 /  for N/P and RTRN call [phone] if cannot make appt
No deformity or limitation of movement

## 2023-02-28 NOTE — TELEPHONE ENCOUNTER
----- Message from Adela Oconnell MD sent at 2/27/2023  6:25 PM EST -----  Call patient, prescription he requested is here to be picked up  Notified pt script is available  Is also requesting attached refil

## 2023-03-01 ENCOUNTER — OFFICE VISIT (OUTPATIENT)
Dept: UROLOGY | Facility: CLINIC | Age: 68
End: 2023-03-01

## 2023-03-01 VITALS
BODY MASS INDEX: 35.35 KG/M2 | HEART RATE: 64 BPM | WEIGHT: 261 LBS | SYSTOLIC BLOOD PRESSURE: 124 MMHG | HEIGHT: 72 IN | OXYGEN SATURATION: 96 % | DIASTOLIC BLOOD PRESSURE: 68 MMHG

## 2023-03-01 DIAGNOSIS — R39.11 BENIGN PROSTATIC HYPERPLASIA WITH URINARY HESITANCY: ICD-10-CM

## 2023-03-01 DIAGNOSIS — R97.20 ELEVATED PSA: Primary | ICD-10-CM

## 2023-03-01 DIAGNOSIS — N40.1 BENIGN PROSTATIC HYPERPLASIA WITH URINARY HESITANCY: ICD-10-CM

## 2023-03-01 RX ORDER — FINASTERIDE 5 MG/1
5 TABLET, FILM COATED ORAL DAILY
Qty: 90 TABLET | Refills: 3 | Status: SHIPPED | OUTPATIENT
Start: 2023-03-01

## 2023-03-22 ENCOUNTER — HOSPITAL ENCOUNTER (OUTPATIENT)
Dept: RADIOLOGY | Age: 68
Discharge: HOME | End: 2023-03-22

## 2023-03-22 DIAGNOSIS — R97.20 ELEVATED PSA: ICD-10-CM

## 2023-03-22 RX ADMIN — GADOBUTROL 11 ML: 604.72 INJECTION INTRAVENOUS at 12:00

## 2023-03-30 ENCOUNTER — TELEPHONE (OUTPATIENT)
Dept: UROLOGY | Facility: CLINIC | Age: 68
End: 2023-03-30

## 2023-03-30 NOTE — PROGRESS NOTES
4/5/2023      Chief Complaint   Patient presents with   • Follow-up         Assessment and Plan    70 y o  male     1  Rising PSA  - MRI showing PIRADS 4 lesions, follow up for fusion guided bx  - Call with any questions or concerns in the meantime  - All questions answered; patient understands and agrees with plan       History of Present Illness  Jazmin Cheney is a 70 y o  male patient with history of rising PSA here for follow up  MRI showing concerning lesion, no repeat PSA for review  Continues with symptoms on dual therapy  Started finasteride 3/1/23  Review of Systems   Constitutional: Negative for activity change, appetite change, chills and fever  HENT: Negative for congestion and trouble swallowing  Respiratory: Negative for cough and shortness of breath  Cardiovascular: Negative for chest pain, palpitations and leg swelling  Gastrointestinal: Negative for abdominal pain, constipation, diarrhea, nausea and vomiting  Genitourinary: Negative for difficulty urinating, dysuria, flank pain, frequency, hematuria and urgency  Musculoskeletal: Negative for back pain and gait problem  Skin: Negative for wound  Allergic/Immunologic: Negative for immunocompromised state  Neurological: Negative for dizziness and syncope  Hematological: Does not bruise/bleed easily  Psychiatric/Behavioral: Negative for confusion  All other systems reviewed and are negative            AUA SYMPTOM SCORE    Flowsheet Row Most Recent Value   AUA SYMPTOM SCORE    How often have you had a sensation of not emptying your bladder completely after you finished urinating? 0 (P)     How often have you had to urinate again less than two hours after you finished urinating? 1 (P)     How often have you found you stopped and started again several times when you urinate? 0 (P)     How often have you found it difficult to postpone urination? 0 (P)     How often have you had a weak urinary stream? 5 (P)     How often have you had to push or strain to begin urination? 1 (P)     How many times did you most typically get up to urinate from the time you went to bed at night until the time you got up in the morning? 3 (P)     Quality of Life: If you were to spend the rest of your life with your urinary condition just the way it is now, how would you feel about that? 3 (P)     AUA SYMPTOM SCORE 10 (P)            Vitals  Vitals:    04/05/23 0952   BP: 138/84   Pulse: 84   SpO2: 94%   Weight: 122 kg (269 lb)   Height: 6' (1 829 m)       Physical Exam  Constitutional:       General: He is not in acute distress  Appearance: Normal appearance  He is not ill-appearing, toxic-appearing or diaphoretic  HENT:      Head: Normocephalic  Nose: No congestion  Eyes:      General: No scleral icterus  Right eye: No discharge  Left eye: No discharge  Conjunctiva/sclera: Conjunctivae normal       Pupils: Pupils are equal, round, and reactive to light  Pulmonary:      Effort: Pulmonary effort is normal    Musculoskeletal:      Cervical back: Normal range of motion  Skin:     General: Skin is warm and dry  Coloration: Skin is not jaundiced or pale  Findings: No bruising, erythema, lesion or rash  Neurological:      General: No focal deficit present  Mental Status: He is alert and oriented to person, place, and time  Mental status is at baseline  Gait: Gait normal    Psychiatric:         Mood and Affect: Mood normal          Behavior: Behavior normal          Thought Content:  Thought content normal          Judgment: Judgment normal            Past History  Past Medical History:   Diagnosis Date   • Colon polyp    • COPD (chronic obstructive pulmonary disease) (HCC)    • Elevated PSA    • H/O colonoscopy 08/22/2011    DESC 8/22/2011   • Hyperlipidemia    • Hypertension    • Shortness of breath    • Sleep apnea    • Synovitis and tenosynovitis      Social History     Socioeconomic History   • Marital status:      Spouse name: None   • Number of children: None   • Years of education: None   • Highest education level: None   Occupational History   • None   Tobacco Use   • Smoking status: Former     Passive exposure: Past   • Smokeless tobacco: Never   • Tobacco comments:     stopped 7 years ago   Vaping Use   • Vaping Use: Never used   Substance and Sexual Activity   • Alcohol use: No   • Drug use: No   • Sexual activity: Not Currently   Other Topics Concern   • None   Social History Narrative    ALWAYS USES SEAT BELTS        EXERCISES REGULARLY-STATIONARY BIKE    SEEING A DENTIST     Social Determinants of Health     Financial Resource Strain: Low Risk    • Difficulty of Paying Living Expenses: Not hard at all   Food Insecurity: Not on file   Transportation Needs: No Transportation Needs   • Lack of Transportation (Medical): No   • Lack of Transportation (Non-Medical):  No   Physical Activity: Not on file   Stress: Not on file   Social Connections: Not on file   Intimate Partner Violence: Not on file   Housing Stability: Not on file     Social History     Tobacco Use   Smoking Status Former   • Passive exposure: Past   Smokeless Tobacco Never   Tobacco Comments    stopped 7 years ago     Family History   Problem Relation Age of Onset   • Alzheimer's disease Mother    • Lung cancer Father    • Multiple sclerosis Father        The following portions of the patient's history were reviewed and updated as appropriate: allergies, current medications, past medical history, past social history, past surgical history and problem list     Results  No results found for this or any previous visit (from the past 1 hour(s)) ]  Lab Results   Component Value Date    PSA 4 44 (H) 02/16/2023    PSA 3 69 09/22/2022    PSA 4 09 (H) 08/15/2022    PSA 3 0 07/06/2021     Lab Results   Component Value Date    CALCIUM 10 3 02/16/2023    K 4 2 02/16/2023    CO2 30 02/16/2023     02/16/2023    BUN 22 02/16/2023 CREATININE 1 27 02/16/2023     Lab Results   Component Value Date    WBC 6 9 02/16/2023    HGB 15 7 02/16/2023    HCT 46 1 02/16/2023    MCV 90 7 02/16/2023     02/16/2023       Amy Bolaños PA-C

## 2023-04-05 ENCOUNTER — TELEPHONE (OUTPATIENT)
Dept: UROLOGY | Facility: CLINIC | Age: 68
End: 2023-04-05

## 2023-04-05 ENCOUNTER — OFFICE VISIT (OUTPATIENT)
Dept: UROLOGY | Facility: CLINIC | Age: 68
End: 2023-04-05

## 2023-04-05 VITALS
WEIGHT: 269 LBS | HEART RATE: 84 BPM | DIASTOLIC BLOOD PRESSURE: 84 MMHG | OXYGEN SATURATION: 94 % | HEIGHT: 72 IN | SYSTOLIC BLOOD PRESSURE: 138 MMHG | BODY MASS INDEX: 36.44 KG/M2

## 2023-04-05 DIAGNOSIS — R97.20 ELEVATED PSA: Primary | ICD-10-CM

## 2023-04-05 NOTE — Clinical Note
PIRADS 4, please schedule fusion guided bx  Please let me know if there is anything you need from me   Thanks

## 2023-04-24 DIAGNOSIS — M51.36 DEGENERATION OF INTERVERTEBRAL DISC OF LUMBAR REGION: ICD-10-CM

## 2023-04-25 RX ORDER — DIAZEPAM 5 MG/1
5 TABLET ORAL 2 TIMES DAILY
Qty: 180 TABLET | Refills: 1 | Status: SHIPPED | OUTPATIENT
Start: 2023-04-25 | End: 2023-10-22

## 2023-04-28 DIAGNOSIS — I82.409 DEEP VEIN THROMBOSIS (DVT) OF LOWER EXTREMITY, UNSPECIFIED CHRONICITY, UNSPECIFIED LATERALITY, UNSPECIFIED VEIN (HCC): ICD-10-CM

## 2023-04-28 RX ORDER — RIVAROXABAN 20 MG/1
TABLET, FILM COATED ORAL
Qty: 90 TABLET | Refills: 3 | Status: SHIPPED | OUTPATIENT
Start: 2023-04-28

## 2023-05-26 ENCOUNTER — APPOINTMENT (OUTPATIENT)
Dept: LAB | Facility: HOSPITAL | Age: 68
End: 2023-05-26
Attending: STUDENT IN AN ORGANIZED HEALTH CARE EDUCATION/TRAINING PROGRAM

## 2023-05-26 DIAGNOSIS — R97.20 ELEVATED PROSTATE SPECIFIC ANTIGEN (PSA): ICD-10-CM

## 2023-05-26 LAB
ALBUMIN SERPL BCP-MCNC: 4.1 G/DL (ref 3.5–5)
ALP SERPL-CCNC: 56 U/L (ref 34–104)
ALT SERPL W P-5'-P-CCNC: 27 U/L (ref 7–52)
ANION GAP SERPL CALCULATED.3IONS-SCNC: 4 MMOL/L (ref 4–13)
AST SERPL W P-5'-P-CCNC: 22 U/L (ref 13–39)
ATRIAL RATE: 55 BPM
BASOPHILS # BLD AUTO: 0.05 THOUSANDS/ÂΜL (ref 0–0.1)
BASOPHILS NFR BLD AUTO: 1 % (ref 0–1)
BILIRUB SERPL-MCNC: 0.71 MG/DL (ref 0.2–1)
BUN SERPL-MCNC: 18 MG/DL (ref 5–25)
CALCIUM SERPL-MCNC: 9.9 MG/DL (ref 8.4–10.2)
CHLORIDE SERPL-SCNC: 109 MMOL/L (ref 96–108)
CO2 SERPL-SCNC: 31 MMOL/L (ref 21–32)
CREAT SERPL-MCNC: 1.24 MG/DL (ref 0.6–1.3)
EOSINOPHIL # BLD AUTO: 0.19 THOUSAND/ÂΜL (ref 0–0.61)
EOSINOPHIL NFR BLD AUTO: 3 % (ref 0–6)
ERYTHROCYTE [DISTWIDTH] IN BLOOD BY AUTOMATED COUNT: 13.4 % (ref 11.6–15.1)
GFR SERPL CREATININE-BSD FRML MDRD: 58 ML/MIN/1.73SQ M
GLUCOSE P FAST SERPL-MCNC: 148 MG/DL (ref 65–99)
HCT VFR BLD AUTO: 47.6 % (ref 36.5–49.3)
HGB BLD-MCNC: 15.4 G/DL (ref 12–17)
IMM GRANULOCYTES # BLD AUTO: 0.05 THOUSAND/UL (ref 0–0.2)
IMM GRANULOCYTES NFR BLD AUTO: 1 % (ref 0–2)
LYMPHOCYTES # BLD AUTO: 2.06 THOUSANDS/ÂΜL (ref 0.6–4.47)
LYMPHOCYTES NFR BLD AUTO: 29 % (ref 14–44)
MCH RBC QN AUTO: 31 PG (ref 26.8–34.3)
MCHC RBC AUTO-ENTMCNC: 32.4 G/DL (ref 31.4–37.4)
MCV RBC AUTO: 96 FL (ref 82–98)
MONOCYTES # BLD AUTO: 0.71 THOUSAND/ÂΜL (ref 0.17–1.22)
MONOCYTES NFR BLD AUTO: 10 % (ref 4–12)
NEUTROPHILS # BLD AUTO: 4.01 THOUSANDS/ÂΜL (ref 1.85–7.62)
NEUTS SEG NFR BLD AUTO: 56 % (ref 43–75)
NRBC BLD AUTO-RTO: 0 /100 WBCS
P AXIS: 102 DEGREES
PLATELET # BLD AUTO: 204 THOUSANDS/UL (ref 149–390)
PMV BLD AUTO: 9.6 FL (ref 8.9–12.7)
POTASSIUM SERPL-SCNC: 4.2 MMOL/L (ref 3.5–5.3)
PR INTERVAL: 178 MS
PROT SERPL-MCNC: 6.9 G/DL (ref 6.4–8.4)
QRS AXIS: 35 DEGREES
QRSD INTERVAL: 82 MS
QT INTERVAL: 448 MS
QTC INTERVAL: 428 MS
RBC # BLD AUTO: 4.96 MILLION/UL (ref 3.88–5.62)
SODIUM SERPL-SCNC: 144 MMOL/L (ref 135–147)
T WAVE AXIS: 68 DEGREES
VENTRICULAR RATE: 55 BPM
WBC # BLD AUTO: 7.07 THOUSAND/UL (ref 4.31–10.16)

## 2023-05-27 LAB — BACTERIA UR CULT: NORMAL

## 2023-05-30 DIAGNOSIS — E79.0 HYPERURICEMIA: ICD-10-CM

## 2023-06-01 ENCOUNTER — TELEPHONE (OUTPATIENT)
Dept: OTHER | Facility: OTHER | Age: 68
End: 2023-06-01

## 2023-06-01 DIAGNOSIS — R97.20 ELEVATED PSA: Primary | ICD-10-CM

## 2023-06-01 RX ORDER — ALLOPURINOL 100 MG/1
TABLET ORAL
Qty: 90 TABLET | Refills: 5 | Status: SHIPPED | OUTPATIENT
Start: 2023-06-01

## 2023-06-01 NOTE — TELEPHONE ENCOUNTER
Patient called and is asking if the office can give him a call regarding his test results that he had got back on his my chart

## 2023-06-02 DIAGNOSIS — I10 ESSENTIAL HYPERTENSION: ICD-10-CM

## 2023-06-02 RX ORDER — CIPROFLOXACIN 500 MG/1
500 TABLET, FILM COATED ORAL EVERY 12 HOURS SCHEDULED
Qty: 2 TABLET | Refills: 0 | Status: SHIPPED | OUTPATIENT
Start: 2023-06-12 | End: 2023-06-13

## 2023-06-02 RX ORDER — METOPROLOL SUCCINATE 50 MG/1
50 TABLET, EXTENDED RELEASE ORAL DAILY
Qty: 90 TABLET | Refills: 3 | Status: SHIPPED | OUTPATIENT
Start: 2023-06-02

## 2023-06-02 NOTE — TELEPHONE ENCOUNTER
Advised patient of urine results and providers recommendations  Advised patient antibiotic sent to pharmacy to be started the day prior to biopsy  Patient verbalized understanding

## 2023-06-02 NOTE — TELEPHONE ENCOUNTER
Culture negative for specific uti  Since he has upcoming biopsy request starting cipro the day prior   Will have routine preop abx day of as well at the hospital

## 2023-06-12 ENCOUNTER — ANESTHESIA EVENT (OUTPATIENT)
Dept: GASTROENTEROLOGY | Facility: HOSPITAL | Age: 68
End: 2023-06-12
Payer: COMMERCIAL

## 2023-06-12 NOTE — ANESTHESIA PREPROCEDURE EVALUATION
Procedure:  TRANSPERINEAL MRI FUSION BIOPSY PROSTATE (Perineum)    Relevant Problems   CARDIO   (+) Essential hypertension   (+) Hyperlipidemia      ENDO   (+) Type 2 diabetes mellitus without complication, without long-term current use of insulin (HCC)      HEMATOLOGY   (+) Hypercoagulable state (HCC)      MUSCULOSKELETAL   (+) Degeneration of intervertebral disc of lumbar region   (+) Gout      NEURO/PSYCH   (+) Chronic pain syndrome      PULMONARY   (+) Chronic obstructive pulmonary disease (COPD) (HCC)   (+) NAS (obstructive sleep apnea)      Other   (+) Mediastinal lymphadenopathy             Anesthesia Plan  ASA Score- 3     Anesthesia Type- IV sedation with anesthesia with ASA Monitors  Additional Monitors:   Airway Plan:     Comment: Discussed risks/benefits, including medication reactions, awareness, aspiration, and serious/life threatening complications  Plan to maintain native airway with IVGA, monitored with EtCO2  Plan Factors-Exercise tolerance (METS): >4 METS  Patient summary reviewed  Patient instructed to abstain from smoking on day of procedure  Patient did not smoke on day of surgery  Induction- intravenous  Postoperative Plan-     Informed Consent- Anesthetic plan and risks discussed with patient  I personally reviewed this patient with the CRNA  Discussed and agreed on the Anesthesia Plan with the CRNA  Reyna Bray

## 2023-06-13 ENCOUNTER — ANESTHESIA (OUTPATIENT)
Dept: GASTROENTEROLOGY | Facility: HOSPITAL | Age: 68
End: 2023-06-13
Payer: COMMERCIAL

## 2023-06-13 ENCOUNTER — HOSPITAL ENCOUNTER (OUTPATIENT)
Facility: HOSPITAL | Age: 68
Setting detail: OUTPATIENT SURGERY
Discharge: HOME | End: 2023-06-13
Attending: STUDENT IN AN ORGANIZED HEALTH CARE EDUCATION/TRAINING PROGRAM | Admitting: STUDENT IN AN ORGANIZED HEALTH CARE EDUCATION/TRAINING PROGRAM
Payer: COMMERCIAL

## 2023-06-13 VITALS
SYSTOLIC BLOOD PRESSURE: 118 MMHG | HEART RATE: 79 BPM | TEMPERATURE: 98.3 F | RESPIRATION RATE: 18 BRPM | OXYGEN SATURATION: 96 % | DIASTOLIC BLOOD PRESSURE: 56 MMHG

## 2023-06-13 DIAGNOSIS — R97.20 ELEVATED PROSTATE SPECIFIC ANTIGEN (PSA): ICD-10-CM

## 2023-06-13 DIAGNOSIS — R39.198 DECREASED URINE STREAM: ICD-10-CM

## 2023-06-13 PROCEDURE — 88342 IMHCHEM/IMCYTCHM 1ST ANTB: CPT | Performed by: PATHOLOGY

## 2023-06-13 PROCEDURE — NC001 PR NO CHARGE: Performed by: STUDENT IN AN ORGANIZED HEALTH CARE EDUCATION/TRAINING PROGRAM

## 2023-06-13 PROCEDURE — 88344 IMHCHEM/IMCYTCHM EA MLT ANTB: CPT | Performed by: PATHOLOGY

## 2023-06-13 PROCEDURE — G0416 PROSTATE BIOPSY, ANY MTHD: HCPCS | Performed by: PATHOLOGY

## 2023-06-13 PROCEDURE — 76942 ECHO GUIDE FOR BIOPSY: CPT | Performed by: STUDENT IN AN ORGANIZED HEALTH CARE EDUCATION/TRAINING PROGRAM

## 2023-06-13 PROCEDURE — 55700 PR PROSTATE NEEDLE BIOPSY ANY APPROACH: CPT | Performed by: STUDENT IN AN ORGANIZED HEALTH CARE EDUCATION/TRAINING PROGRAM

## 2023-06-13 RX ORDER — ALBUTEROL SULFATE 90 UG/1
AEROSOL, METERED RESPIRATORY (INHALATION) AS NEEDED
Status: DISCONTINUED | OUTPATIENT
Start: 2023-06-13 | End: 2023-06-13

## 2023-06-13 RX ORDER — LIDOCAINE HYDROCHLORIDE 20 MG/ML
INJECTION, SOLUTION EPIDURAL; INFILTRATION; INTRACAUDAL; PERINEURAL AS NEEDED
Status: DISCONTINUED | OUTPATIENT
Start: 2023-06-13 | End: 2023-06-13 | Stop reason: HOSPADM

## 2023-06-13 RX ORDER — LIDOCAINE HYDROCHLORIDE 10 MG/ML
0.5 INJECTION, SOLUTION EPIDURAL; INFILTRATION; INTRACAUDAL; PERINEURAL ONCE AS NEEDED
Status: DISCONTINUED | OUTPATIENT
Start: 2023-06-13 | End: 2023-06-13 | Stop reason: HOSPADM

## 2023-06-13 RX ORDER — SODIUM CHLORIDE, SODIUM LACTATE, POTASSIUM CHLORIDE, CALCIUM CHLORIDE 600; 310; 30; 20 MG/100ML; MG/100ML; MG/100ML; MG/100ML
125 INJECTION, SOLUTION INTRAVENOUS CONTINUOUS
Status: DISCONTINUED | OUTPATIENT
Start: 2023-06-13 | End: 2023-06-13 | Stop reason: HOSPADM

## 2023-06-13 RX ORDER — PROPOFOL 10 MG/ML
INJECTION, EMULSION INTRAVENOUS AS NEEDED
Status: DISCONTINUED | OUTPATIENT
Start: 2023-06-13 | End: 2023-06-13

## 2023-06-13 RX ORDER — LIDOCAINE HYDROCHLORIDE 10 MG/ML
INJECTION, SOLUTION EPIDURAL; INFILTRATION; INTRACAUDAL; PERINEURAL AS NEEDED
Status: DISCONTINUED | OUTPATIENT
Start: 2023-06-13 | End: 2023-06-13

## 2023-06-13 RX ORDER — BUPIVACAINE HYDROCHLORIDE 5 MG/ML
INJECTION, SOLUTION EPIDURAL; INTRACAUDAL AS NEEDED
Status: DISCONTINUED | OUTPATIENT
Start: 2023-06-13 | End: 2023-06-13 | Stop reason: HOSPADM

## 2023-06-13 RX ORDER — PROPOFOL 10 MG/ML
INJECTION, EMULSION INTRAVENOUS CONTINUOUS PRN
Status: DISCONTINUED | OUTPATIENT
Start: 2023-06-13 | End: 2023-06-13

## 2023-06-13 RX ORDER — SODIUM CHLORIDE 9 MG/ML
INJECTION, SOLUTION INTRAVENOUS CONTINUOUS PRN
Status: DISCONTINUED | OUTPATIENT
Start: 2023-06-13 | End: 2023-06-13

## 2023-06-13 RX ADMIN — ALBUTEROL SULFATE 2 PUFF: 90 AEROSOL, METERED RESPIRATORY (INHALATION) at 11:14

## 2023-06-13 RX ADMIN — PROPOFOL 100 MCG/KG/MIN: 10 INJECTION, EMULSION INTRAVENOUS at 11:18

## 2023-06-13 RX ADMIN — PROPOFOL 70 MG: 10 INJECTION, EMULSION INTRAVENOUS at 11:18

## 2023-06-13 RX ADMIN — LIDOCAINE HYDROCHLORIDE 50 MG: 10 INJECTION, SOLUTION EPIDURAL; INFILTRATION; INTRACAUDAL; PERINEURAL at 11:18

## 2023-06-13 RX ADMIN — SODIUM CHLORIDE: 0.9 INJECTION, SOLUTION INTRAVENOUS at 11:12

## 2023-06-13 RX ADMIN — CEFTRIAXONE SODIUM 1000 MG: 10 INJECTION, POWDER, FOR SOLUTION INTRAVENOUS at 10:25

## 2023-06-13 NOTE — ANESTHESIA POSTPROCEDURE EVALUATION
Post-Op Assessment Note    CV Status:  Stable  Pain Score: 0    Pain management: adequate     Mental Status:  Alert and awake   Hydration Status:  Euvolemic   PONV Controlled:  Controlled   Airway Patency:  Patent      Post Op Vitals Reviewed: Yes      Staff: Anesthesiologist, CRNA         No notable events documented      /59 (06/13/23 1157)    Temp 98 3 °F (36 8 °C) (06/13/23 1157)    Pulse 79 (06/13/23 1157)   Resp 18 (06/13/23 1157)    SpO2   98

## 2023-06-13 NOTE — DISCHARGE INSTR - AVS FIRST PAGE
"Mr Deena Acosta underwent prostate biopsy today in the operating room  Everything went as planned and we are just waiting for the pathology to return  Rest for the next couple days and use ice on the perineum if needed  You have an appointment scheduled with Dr Marita Basilio on 6/27/2023  At this appointment he will discuss your pathology  Pathology results become immediately available on [employer]  If you are not comfortable viewing the results prior to discussion, please avoid opening these results prior to your appointment  If you have any concerns in the meantime please do not hesitate to call our office at [phone]  You may resume your Xarelto on Thursday 6/15/2023  Best,    Dr Taylor Lema:   A prostate biopsy is a procedure to remove samples of tissue from your prostate gland  The prostate is a male sex gland that makes fluid found in semen  It is located just below the bladder  After the samples are removed, they are sent to a lab and tested for cancer  During a fusion biopsy technology is utilized to \"fuse\" the MRI images that were previously performed with the transrectal ultrasound that is being done in real-time  This way we are able to target the spot that was seen on MRI and take additional samples of the spot which we call the \" region of interest \"       DISCHARGE INSTRUCTIONS:   Seek care immediately if:   You have heavy bleeding from your rectum  You urinate very little or not at all  You have pain from your procedure that gets worse, even after you take pain medicine  Call your urologist or doctor if:   You have a fever or chills  You feel severe pain or burning when you urinate  Your urine is cloudy or smells bad  You have questions or concerns about your condition or care  Medicines:      Take your medicine as directed  Contact your healthcare provider if you think your medicine is not helping or if you have side effects   Tell your " provider if you are allergic to any medicine  Keep a list of the medicines, vitamins, and herbs you take  Include the amounts, and when and why you take them  Bring the list or the pill bottles to follow-up visits  Carry your medicine list with you in case of an emergency

## 2023-06-13 NOTE — H&P
"  UROLOGY HISTORY AND PHYSICAL  Name: Leslie Da Silva  : 1955  MRN: [id number]  Date of Service: 23    Surgical indication:  Elevated PSA    History of present illness:  Leslie Da Silva is a 70 y o  male presenting for transperineal MRI Fusion prostate biopsy  Has a history of elevated PSA up to 4 44  Patient is on Finasteride which was started after PSA was collected  He underwent mpMRI of the prostate demonstrating:    \"1  PI-RADSv2 1 Category 4 -High (clinically significant cancer is likely to be present)  1 5 cm right anterior transitional zone lesion at the base to mid gland      2  No extraprostatic tumor, seminal vesicle invasion, pelvic lymphadenopathy, or pelvic osseous metastatic disease      3  Calculated prostate volume of 41 cc      4  Diffusely trabeculated urinary bladder wall with small diverticulum at the dome   \"    He was scheduled for fusion biopsy  He takes the following blood thinners: Xarelto  Last dose was 2 days ago  Past medical history:  Past Medical History:   Diagnosis Date   • Colon polyp    • COPD (chronic obstructive pulmonary disease) ([facility] [address] )    • Elevated PSA    • H/O colonoscopy 2011    DESC 2011   • Hyperlipidemia    • Hypertension    • Pulmonary emboli (HCC)     on Xarelto   • Shortness of breath    • Sleep apnea    • Synovitis and tenosynovitis        Past surgical history  Past Surgical History:   Procedure Laterality Date   • BACK SURGERY     • COLONOSCOPY     • HERNIA REPAIR     • KNEE SURGERY Right    • LUMBAR LAMINECTOMY         Current medications:  Current Facility-Administered Medications   Medication Dose Route Frequency Provider Last Rate Last Admin   • lactated ringers infusion  125 mL/hr Intravenous Continuous Eugene Buckley MD       • lidocaine (PF) (XYLOCAINE-MPF) 1 % injection 0 5 mL  0 5 mL Infiltration Once PRN Eugene Buckely MD           Allergies:   Allergies   Allergen Reactions   • Penicillins Hives       Social " history:  Social History     Socioeconomic History   • Marital status:      Spouse name: None   • Number of children: None   • Years of education: None   • Highest education level: None   Occupational History   • None   Tobacco Use   • Smoking status: Former     Passive exposure: Past   • Smokeless tobacco: Never   • Tobacco comments:     Quit 2013   Vaping Use   • Vaping Use: Never used   Substance and Sexual Activity   • Alcohol use: Yes     Comment: 2 shots a year, seldom   • Drug use: No   • Sexual activity: Not Currently   Other Topics Concern   • None   Social History Narrative    ALWAYS USES SEAT BELTS        EXERCISES REGULARLY-STATIONARY BIKE    SEEING A DENTIST     Social Determinants of Health     Financial Resource Strain: Low Risk  (2/23/2023)    Overall Financial Resource Strain (CARDIA)    • Difficulty of Paying Living Expenses: Not hard at all   Food Insecurity: Not on file   Transportation Needs: No Transportation Needs (2/23/2023)    PRAPARE - Transportation    • Lack of Transportation (Medical): No    • Lack of Transportation (Non-Medical): No   Physical Activity: Not on file   Stress: Not on file   Social Connections: Not on file   Intimate Partner Violence: Not on file   Housing Stability: Not on file       Family history:  Family History   Problem Relation Age of Onset   • Alzheimer's disease Mother    • Lung cancer Father    • Multiple sclerosis Father        Review of systems:  Pertinent positives and negatives in HPI    Physical exam:  /70   Pulse (!) 54   Temp 97 8 °F (36 6 °C) (Tympanic)   Resp 18   SpO2 95% Comment: 2L (89 on room air)  General:  Healthy appearing male in no acute distress  Head:  Normocephalic, atraumatic  Eyes: no scleral icterus  ENMT: Nares patent, moist mucous membranes  Cardiovascular:  Regular rate  Respiratory:  Patient has unlabored respirations     Abdomen:  Abdomen nondistended  Extremities: Normal ROM, no deformities  WILLIS: deferred    Labs:  CBC:   Lab Results   Component Value Date    HCT 47 6 05/26/2023    HGB 15 4 05/26/2023    MCV 96 05/26/2023     05/26/2023    WBC 7 07 05/26/2023       BMP:   Lab Results   Component Value Date    BUN 18 05/26/2023    CALCIUM 9 9 05/26/2023     (H) 05/26/2023    CO2 31 05/26/2023    CREATININE 1 24 05/26/2023    K 4 2 05/26/2023       Assessment:  70 y o  male with elevated PSA presenting for transperineal MRI fusion prostate biopsy  Plan:  1  To OR for above  2  Rocephin on call    Anjum Rivera MD  [facility]    This note was written using fluency dictation software  Please excuse any resulting minor grammatical errors

## 2023-06-13 NOTE — OP NOTE
OPERATIVE REPORT  PATIENT NAME: Delroy Carmona    :  1955  MRN: [id number]  Pt Location: [facility] 01    SURGERY DATE: 2023    Surgeon(s) and Role: Lisa Travis MD - Primary    Preop Diagnosis:  Elevated prostate specific antigen (PSA) [R97 20]    Post-Op Diagnosis Codes:     * Elevated prostate specific antigen (PSA) [R97 20]    Procedure(s):  TRANSPERINEAL MRI FUSION BIOPSY PROSTATE    Specimen(s):  ID Type Source Tests Collected by Time Destination   1 : GUILHERME R ant base x 5 Tissue Prostate TISSUE EXAM Hailey Rodriguez MD 2023 1059    2 : R ant med x 2 Tissue Prostate TISSUE EXAM Hailey Rodriguez MD 2023 1059    3 : R ant lat x 2 Tissue Prostate TISSUE EXAM Haliey Rodriguez MD 2023 1059    4 : L ant lat x 2 Tissue Prostate TISSUE EXAM Hailey Rodriguez MD 2023 1059    5 : L ant med x 2 Tissue Prostate TISSUE EXAM Hailey Rodriguez MD 2023 1059    6 : L post lat x 2 Tissue Prostate TISSUE EXAM Hailey Rodriguez MD 2023 1059    7 : L post med x 2 Tissue Prostate TISSUE EXAM Hailey Rodriguez MD 2023 1059    8 : L base x 2 Tissue Prostate TISSUE EXAM Hailey Rodriguez MD 2023 1059    9 : R post lat x 2 Tissue Prostate TISSUE EXAM Hailey Rodriguez MD 2023 1059    10 : R post med x 2 Tissue Prostate TISSUE EXAM Hailey Rodriguez MD 2023 1059    11 : R base x 2 Tissue Prostate TISSUE EXAM Hailey Rodriguez MD 2023 1059        Estimated Blood Loss:   Minimal    Drains:  * No LDAs found *    Anesthesia Type:   IV Sedation with Anesthesia    Operative Indications:  Elevated prostate specific antigen (PSA) [R97 20]      Operative Findings:  No palpable disease on digital rectal exam   A total of 25 cores were obtained      Complications:   None    Procedure and Technique:  Procedure was transperineal saturation prostate biopsy with MRI fusion sampling of lesion or lesions utilizing the Precision Point perineal access device utilized to map the standard geographic sites of the prostate  Faustino Batista is a 70 y o  male with history of elevated PSA and abnormal MRI  Patient has not undergone prior biopsy of the prostate  Patient did undergo pre biopsy multiparametric MRI of the prostate  There was/were 1 lesion(s) with highest PIRADs rating 4 so the patient was scheduled for transperineal saturation biopsy with addition of MRI fusion of the abnormal lesions  The patient was scheduled for his procedure in an outpatient surgical context with the assistance of the anesthesia team for sedation  He was met in the preoperative holding area by the performing urologist, the anesthesia team and the intraoperative nursing staff  The procedure along with its risks and benefits were discussed and reviewed  Patient signed an informed consent  Patient was then transferred to procedure suite  Pre-procedural time out was performed with all parties present  He was treated with periprocedural antibiotics, rocephin 1 gram  He was placed in dorsal lithotomy with care to pad all pressure points  His perineum and genitalia were shaved/prepped with chloroprep  The scrotum was elevated a secured aware from the perineum above the rectum  With the assistance of the [address] [place] [address] technician, a survey of the prostate anatomy was performed  The technician then linked the previously obtained MRI images to the real-time ultrasound  The PIRADs lesions seen on MRI were identified on the ultrasound  In the midportion of the left and right prostate, a diane was denoted in the perineal skin  Skin wheal was elevated with a 50/50 mixture of 2% lidocaine plain and 0 5% marcaine plain on either side  The PrecisionPoint access needle was then introduced into the left perineal subcutaneous tissue  We visualized the tip of the needle   Spinal needle was introduced through the subcutaneous fat and into the pelvic floor muscle where a perineal pudendal block was performed with additional local anesthetic  This was repeated on the patient's right side  Utilizing the Precision Point access needle and stepper, ultrasound guidance was utilized to place the spring-loaded biopsy needle into MRI lesions  The first lesion that was sampled was PIRADs 4 in the right anterior transition zone at the base  5 samples were taken  We confirmed good position of the needle strikes utilizing the fusion software  We now moved to take our standard transperineal samples, which allowed us to carefully map and saturate each of the 10 zones that have subdivided the prostate into zonal anatomy  The Precision Point access needle and stepper was positioned into the RIGHT perineal space and ultrasound guidance was utilized to place the spring-loaded biopsy needle into the following areas    RIGHT anterior medial: 2 biopsies taken  RIGHT posterior medial: 2 biopsies taken  RIGHT posterior lateral: 2 biopsies taken  RIGHT base: 2 biopsies taken  RIGHT anterior lateral: 2 biopsies taken  The Precision Point access needle and stepper was re-positioned into the LEFT perineal space and ultrasound guidance was utilized to place the spring-loaded biopsy needle into the following areas  LEFT anterior medial: 2 biopsies taken  LEFT posterior medial: 2 biopsies taken  LEFT posterior lateral: 2 biopsies taken  LEFT base: 2 biopsies taken  LEFT anterior lateral: 2 biopsies taken    A total of 25 biopsy cores were obtained  Transrectal ultrasound was removed  The scrotum was released  Some gentle pressure was placed on the perineum for hemostasis  At the completion of the procedure, the patient was taken out of dorsal lithotomy, recovered from their anesthesia, and transferred to PACU in good condition  Overall, the patient tolerated the procedure and there were no complications      Plan: The patient will be monitored in recovery, allowed to void prior to discharge and return for biopsy pathology review in the office with their primary urologist      I was present for the entire procedure      Patient Disposition:  PACU         SIGNATURE: Mohan Case MD  DATE: June 13, 2023  TIME: 11:50 AM

## 2023-06-15 PROCEDURE — 88344 IMHCHEM/IMCYTCHM EA MLT ANTB: CPT | Performed by: PATHOLOGY

## 2023-06-15 PROCEDURE — 88342 IMHCHEM/IMCYTCHM 1ST ANTB: CPT | Performed by: PATHOLOGY

## 2023-06-15 PROCEDURE — G0416 PROSTATE BIOPSY, ANY MTHD: HCPCS | Performed by: PATHOLOGY

## 2023-06-20 LAB
LEFT EYE DIABETIC RETINOPATHY: NORMAL
RIGHT EYE DIABETIC RETINOPATHY: NORMAL

## 2023-06-21 NOTE — PROGRESS NOTES
Problem List Items Addressed This Visit        Endocrine    Type 2 diabetes mellitus with diabetic polyneuropathy, unspecified whether long term insulin use ([place] [address] )       Lab Results   Component Value Date    HGBA1C 7 0 (H) 02/16/2023   Damian Lim has diabetic neuropathy, this may also be affecting his ability to mount detrusor contractions  This increases his risk for surgical infection and complications as well            Respiratory    Chronic obstructive pulmonary disease (COPD) ([facility] )     Frieda Villegas has COPD which can make tolerating CO2 pneumoperitoneum difficult or impossible for robotic cancer surgery         NAS (obstructive sleep apnea) - Primary     Using a CPAP, he is not convinced he needs this but I did encourage him to keep using this  NAS increases risk of perioperative complications and anesthesia troubles            Genitourinary    Stage 3 chronic kidney disease, unspecified whether stage 3a or 3b CKD ([place] [id number] )     Lab Results   Component Value Date    EGFR 58 05/26/2023    EGFR 62 02/16/2023    EGFR 65 08/15/2022    CREATININE 1 24 05/26/2023    CREATININE 1 27 02/16/2023    CREATININE 1 21 08/15/2022   multifactorial  This can contribute to higher risk of acute kidney injury after robotic surgery         Prostate cancer [facility])     Extensive discussion today regarding treatment options include surgery and radiation therapy  Given comorbid conditions and long term use of anticoagulation I recommend radiation therapy         Relevant Orders    Ambulatory Referral to Radiation Oncology       Other    ED (erectile dysfunction)     Due to age, HTN, obesity, and DM  Would worsen s/p surgery for prostate cancer         Class 2 severe obesity due to excess calories with serious comorbidity and body mass index (BMI) of 35 0 to 35 9 in adult [facility])     Large abdominal girth   I am concerned that this would make radical prostatectomy very difficult due to his anatomy and visceral fat content         Decreased urine stream "    On tamsulosin and finasteride, only started finasteride around a month ago  Long term we can proceed to cystoscopy to assess his outlet for possible channel TURP vs urolift or iTIND in the future  Continue dual medical therapy at this time         Elevated PSA     Due to BPH and prostate cancer              Discussion:    Today we discussed his pathology from his prostate biopsy including definition of prostate cancer risk groups, a discussion of the [place] score and the meaning of his biopsy results in terms of his future management and overall health and treatment  I had a discussion with the patient regarding the natural history and treatment options for prostate cancer and the potential need for multimodal treatments  Active surveillance, surgical excision in the form of open or robotic surgery, and radiation therapies plus or minus androgen deprivation therapy were discussed at length with the patient  With regard to multimodal therapy discussion this could include surgical excision with postoperative radiation therapy (RT) +/-androgen deprivation therapy (ADT) if adverse pathology or RT with long-term ADT  We discussed the pre, sean, and post operative care for robotic radical prostatectomy  I am concerned that he is not an ideal surgical candidate       He has participated in shared/informed decision-making model with regard to this major urologic surgery with risk  I will have him speak with radiation oncology as well so that he is further informed as to his options        I recommend radiation therapy    Portions of the above record have been created with voice recognition software  Occasional wrong word or \"sound alike\" substitution may have occurred due to the inherent limitations of voice recognition software  Read the chart carefully and recognize, using context, where substitution may have occurred      Assessment and plan:       Please see problem oriented charting for the assessment " plan of today's urological complaints      Annabel Busch MD      Chief Complaint     As listed above      History of Present Illness     Cliff Lopes is a 70 y o  man with elevated psa s/p MRI targeted biopsy, MRI negative for metastatic disease  ECOG is 0-1 depending on the day, mostly sedentary, limited by his peripheral neuropathy    He has undergone hernia surgery previously, this can sometimes make radical prostatectomy more challenging  After an extensive discussion of his options for management he wishes to consider all options and to consult with radiation oncology    The following portions of the patient's history were reviewed and updated as appropriate: allergies, current medications, past family history, past medical history, past social history, past surgical history and problem list     Detailed Urologic History     - please refer to HPI    Review of Systems     Review of Systems   Constitutional: Negative  HENT: Negative  Eyes: Negative  Respiratory: Negative  Cardiovascular: Negative  Gastrointestinal: Negative  Endocrine: Negative  Genitourinary:        As per HPI   Musculoskeletal: Negative  Skin: Negative  Allergic/Immunologic: Negative  Neurological: Negative  Peripheral neuropathy   Hematological: Negative  Psychiatric/Behavioral: Negative          AUA SYMPTOM SCORE    Flowsheet Row Most Recent Value   AUA SYMPTOM SCORE    How often have you had a sensation of not emptying your bladder completely after you finished urinating? 1 (P)     How often have you had to urinate again less than two hours after you finished urinating? 2 (P)     How often have you found you stopped and started again several times when you urinate? 0 (P)     How often have you found it difficult to postpone urination? 0 (P)     How often have you had a weak urinary stream? 5 (P)     How often have you had to push or strain to begin urination? 1 (P)     How many times did you most typically get up to urinate from the time you went to bed at night until the time you got up in the morning? 3 (P)     Quality of Life: If you were to spend the rest of your life with your urinary condition just the way it is now, how would you feel about that? 6 (P)     AUA SYMPTOM SCORE 12 (P)             Allergies     Allergies   Allergen Reactions   • Penicillins Hives       Physical Exam     Physical Exam  Vitals reviewed  Constitutional:       General: He is not in acute distress  Appearance: Normal appearance  He is obese  He is not ill-appearing, toxic-appearing or diaphoretic  HENT:      Head: Normocephalic and atraumatic  Eyes:      General: No scleral icterus  Right eye: No discharge  Left eye: No discharge  Cardiovascular:      Pulses: Normal pulses  Pulmonary:      Effort: Pulmonary effort is normal    Abdominal:      General: There is no distension  Palpations: There is no mass  Comments: Large abdominal girth   Musculoskeletal:         General: No swelling  Skin:     General: Skin is warm  Coloration: Skin is not jaundiced  Neurological:      General: No focal deficit present  Mental Status: He is alert and oriented to person, place, and time  Cranial Nerves: No cranial nerve deficit  Psychiatric:         Mood and Affect: Mood normal          Behavior: Behavior normal          Thought Content:  Thought content normal          Judgment: Judgment normal              Vital Signs  Vitals:    06/27/23 0745   BP: 132/86   Pulse: 57   SpO2: 97%   Weight: 119 kg (262 lb)   Height: 6' (1 829 m)         Current Medications       Current Outpatient Medications:   •  albuterol (PROVENTIL HFA,VENTOLIN HFA) 90 mcg/act inhaler, Inhale 2 puffs every 4 (four) hours as needed, Disp: , Rfl:   •  allopurinol (ZYLOPRIM) 100 mg tablet, TAKE ONE TABLET BY MOUTH DAILY, Disp: 90 tablet, Rfl: 5  •  atorvastatin (LIPITOR) 40 mg tablet, Take 1 tablet (40 mg total) by mouth daily, Disp: 90 tablet, Rfl: 3  •  diazepam (VALIUM) 5 mg tablet, TAKE 1 TABLET (5 MG TOTAL) BY MOUTH 2 (TWO) TIMES A DAY, Disp: 180 tablet, Rfl: 1  •  fenofibrate micronized (LOFIBRA) 134 MG capsule, Take 1 capsule (134 mg total) by mouth daily, Disp: 90 capsule, Rfl: 3  •  finasteride (PROSCAR) 5 mg tablet, Take 1 tablet (5 mg total) by mouth daily, Disp: 90 tablet, Rfl: 3  •  metoprolol succinate (TOPROL-XL) 50 mg 24 hr tablet, TAKE 1 TABLET (50 MG TOTAL) BY MOUTH DAILY, Disp: 90 tablet, Rfl: 3  •  metroNIDAZOLE (METROGEL) 0 75 % gel, Apply topically 2 (two) times a day, Disp: 45 g, Rfl: 5  •  Multiple Vitamin (DAILY VALUE MULTIVITAMIN) TABS, Take 1 tablet by mouth daily, Disp: , Rfl:   •  Omega-3 Fatty Acids (fish oil) 1,000 mg, Take 1,000 mg by mouth daily, Disp: , Rfl:   •  pregabalin (LYRICA) 150 mg capsule, TAKE 1 CAPSULE (150 MG TOTAL) BY MOUTH 2 (TWO) TIMES A DAY, Disp: 180 capsule, Rfl: 1  •  tadalafil (CIALIS) 20 MG tablet, Take 1 tablet (20 mg total) by mouth daily as needed for erectile dysfunction, Disp: 10 tablet, Rfl: 10  •  tamsulosin (FLOMAX) 0 4 mg, Take 2 capsules (0 8 mg total) by mouth daily with dinner (Patient taking differently: Take 0 8 mg by mouth 2 (two) times a day), Disp: 180 capsule, Rfl: 3  •  Trelegy Ellipta 100-62 5-25 MCG/INH inhaler, Inhale 1 puff daily, Disp: , Rfl:   •  triamcinolone (KENALOG) 0 5 % cream, Apply topically 3 (three) times a day, Disp: 30 g, Rfl: 5  •  Xarelto 20 MG tablet, TAKE ONE TABLET BY MOUTH DAILY, Disp: 90 tablet, Rfl: 3      Active Problems     Patient Active Problem List   Diagnosis   • Chronic obstructive pulmonary disease (COPD) (HCC)   • Essential hypertension   • Gout   • Hypercoagulable state ([facility] )   • Hyperlipidemia   • NAS (obstructive sleep apnea)   • Degeneration of intervertebral disc of lumbar region   • Type 2 diabetes mellitus with diabetic polyneuropathy, unspecified whether long term insulin use (HCC)   • ED (erectile dysfunction)   • Edema   • Mediastinal lymphadenopathy   • Neuropathy   • Ventral hernia   • Class 2 severe obesity due to excess calories with serious comorbidity and body mass index (BMI) of 35 0 to 35 9 in adult [facility])   • Multiple lung nodules on CT   • Former smoker   • Nuclear sclerotic cataract of both eyes   • Lumbar radiculopathy   • Atopic dermatitis   • Decreased urine stream   • PVD (peripheral vascular disease) (HCC)   • Lip lesion   • Rosacea   • Elevated PSA   • Stage 3 chronic kidney disease, unspecified whether stage 3a or 3b CKD (HCC)   • Prostate cancer [facility])         Past Medical History     Past Medical History:   Diagnosis Date   • Colon polyp    • COPD (chronic obstructive pulmonary disease) ([facility] )    • Elevated PSA    • Erectile dysfunction    • H/O colonoscopy 08/22/2011    DESC 8/22/2011   • Hyperlipidemia    • Hypertension    • Lower urinary tract symptoms (LUTS)    • Pulmonary emboli (HCC)     on Xarelto   • Shortness of breath    • Sleep apnea    • Synovitis and tenosynovitis          Surgical History     Past Surgical History:   Procedure Laterality Date   • BACK SURGERY     • COLONOSCOPY     • HERNIA REPAIR     • KNEE SURGERY Right    • LUMBAR LAMINECTOMY     • SD BX PROSTATE STRTCTC SATURATION SAMPLING IMG GID N/A 6/13/2023    Procedure: TRANSPERINEAL MRI FUSION BIOPSY PROSTATE;  Surgeon: Bonnie Lim MD;  Location: [facility];  Service: Urology         Family History     Family History   Problem Relation Age of Onset   • Alzheimer's disease Mother    • Lung cancer Father    • Multiple sclerosis Father          Social History     Social History     Social History     Tobacco Use   Smoking Status Former   • Passive exposure: Past   Smokeless Tobacco Never   Tobacco Comments    Quit 2013         Pertinent Lab Values     Lab Results   Component Value Date    CREATININE 1 24 05/26/2023       Lab Results   Component Value Date    PSA 4 44 (H) 02/16/2023    PSA 3 69 09/22/2022    PSA 4 09 (H) 08/15/2022       Final Diagnosis   A  Prostate, GUILHERME R ant base x 5:  - Prostatic adenocarcinoma, acinar type, Carl score 3 + 4 = 7, Prognostic Grade Group 2, discontinuously involving 4 of 5 cores (70%, 30%, 5%, <5%, 0%)  - Percentage of Carl pattern 4: 20%  - Perineural invasion: Not identified     B  Prostate, R ant med x 2:  - Benign prostate tissue       C  Prostate, R ant lat x 2:  - Benign prostate tissue       D  Prostate, L ant lat x 2:  - Benign prostate tissue       E  Prostate, L ant med x 2:  - Benign prostate tissue       F  Prostate, L post lat x 2:  - Benign prostate tissue       G  Prostate, L post med x 2:  - Benign prostate tissue       H  Prostate, L base x 2:  - Benign prostate tissue       I  Prostate, R post lat x 2:  - Benign prostate tissue       J  Prostate, R post med x 2:  - Benign prostate tissue       K  Prostate, R base x 2:  - Benign prostate tissue      Comment: Immunohistochemistry for a prostate multiplex stain (p63, K903, and P504S) and NKX3 1 is consistent with the diagnosis      Block/unstained slides from Block A2 are suggested if additional studies are indicated  Electronically signed by Samantha Martin MD on 6/15/2023 at  2:40 PM             Pertinent Imaging       The patient's images were reviewed by me personally and also in real time with them in the examination room using our PACS imaging system  The imaging findings are significant for a prostate MRI showin  PI-RADSv2 1 Category 4 -High (clinically significant cancer is likely to be present)  1 5 cm right anterior transitional zone lesion at the base to mid gland      2  No extraprostatic tumor, seminal vesicle invasion, pelvic lymphadenopathy, or pelvic osseous metastatic disease      3  Calculated prostate volume of 41 cc      4  Diffusely trabeculated urinary bladder wall with small diverticulum at the dome

## 2023-06-27 ENCOUNTER — OFFICE VISIT (OUTPATIENT)
Dept: UROLOGY | Facility: CLINIC | Age: 68
End: 2023-06-27
Payer: COMMERCIAL

## 2023-06-27 VITALS
OXYGEN SATURATION: 97 % | WEIGHT: 262 LBS | DIASTOLIC BLOOD PRESSURE: 86 MMHG | HEIGHT: 72 IN | SYSTOLIC BLOOD PRESSURE: 132 MMHG | BODY MASS INDEX: 35.49 KG/M2 | HEART RATE: 57 BPM

## 2023-06-27 DIAGNOSIS — G47.33 OSA (OBSTRUCTIVE SLEEP APNEA): Primary | ICD-10-CM

## 2023-06-27 DIAGNOSIS — R39.198 DECREASED URINE STREAM: ICD-10-CM

## 2023-06-27 DIAGNOSIS — R97.20 ELEVATED PSA: ICD-10-CM

## 2023-06-27 DIAGNOSIS — C61 PROSTATE CANCER (HCC): ICD-10-CM

## 2023-06-27 DIAGNOSIS — E66.01 CLASS 2 SEVERE OBESITY DUE TO EXCESS CALORIES WITH SERIOUS COMORBIDITY AND BODY MASS INDEX (BMI) OF 35.0 TO 35.9 IN ADULT (HCC): ICD-10-CM

## 2023-06-27 DIAGNOSIS — N18.30 STAGE 3 CHRONIC KIDNEY DISEASE, UNSPECIFIED WHETHER STAGE 3A OR 3B CKD (HCC): ICD-10-CM

## 2023-06-27 DIAGNOSIS — E11.9 TYPE 2 DIABETES MELLITUS WITHOUT COMPLICATION, WITHOUT LONG-TERM CURRENT USE OF INSULIN (HCC): ICD-10-CM

## 2023-06-27 DIAGNOSIS — E11.42 TYPE 2 DIABETES MELLITUS WITH DIABETIC POLYNEUROPATHY, UNSPECIFIED WHETHER LONG TERM INSULIN USE (HCC): ICD-10-CM

## 2023-06-27 DIAGNOSIS — J44.9 CHRONIC OBSTRUCTIVE PULMONARY DISEASE, UNSPECIFIED COPD TYPE (HCC): ICD-10-CM

## 2023-06-27 DIAGNOSIS — N52.01 ERECTILE DYSFUNCTION DUE TO ARTERIAL INSUFFICIENCY: ICD-10-CM

## 2023-06-27 PROCEDURE — 99215 OFFICE O/P EST HI 40 MIN: CPT | Performed by: UROLOGY

## 2023-06-27 NOTE — ASSESSMENT & PLAN NOTE
Lab Results   Component Value Date    HGBA1C 7 0 (H) 02/16/2023   Elsa Titus has diabetic neuropathy, this may also be affecting his ability to mount detrusor contractions   This increases his risk for surgical infection and complications as well

## 2023-06-27 NOTE — ASSESSMENT & PLAN NOTE
[facility] which can make tolerating CO2 pneumoperitoneum difficult or impossible for robotic cancer surgery

## 2023-06-27 NOTE — ASSESSMENT & PLAN NOTE
Extensive discussion today regarding treatment options include surgery and radiation therapy   Given comorbid conditions and long term use of anticoagulation I recommend radiation therapy

## 2023-06-27 NOTE — ASSESSMENT & PLAN NOTE
Large abdominal girth   I am concerned that this would make radical prostatectomy very difficult due to his anatomy and visceral fat content

## 2023-06-27 NOTE — ASSESSMENT & PLAN NOTE
Lab Results   Component Value Date    EGFR 58 05/26/2023    EGFR 62 02/16/2023    EGFR 65 08/15/2022    CREATININE 1 24 05/26/2023    CREATININE 1 27 02/16/2023    CREATININE 1 21 08/15/2022   multifactorial  This can contribute to higher risk of acute kidney injury after robotic surgery

## 2023-06-27 NOTE — ASSESSMENT & PLAN NOTE
On tamsulosin and finasteride, only started finasteride around a month ago  Long term we can proceed to cystoscopy to assess his outlet for possible channel TURP vs urolift or iTIND in the future   Continue dual medical therapy at this time

## 2023-06-27 NOTE — ASSESSMENT & PLAN NOTE
Using a CPAP, he is not convinced he needs this but I did encourage him to keep using this   NAS increases risk of perioperative complications and anesthesia troubles

## 2023-07-11 ENCOUNTER — RADIATION ONCOLOGY CONSULT (OUTPATIENT)
Dept: RADIATION ONCOLOGY | Facility: CLINIC | Age: 68
End: 2023-07-11
Attending: RADIOLOGY
Payer: COMMERCIAL

## 2023-07-11 ENCOUNTER — TELEPHONE (OUTPATIENT)
Dept: RADIATION ONCOLOGY | Facility: CLINIC | Age: 68
End: 2023-07-11

## 2023-07-11 VITALS
OXYGEN SATURATION: 94 % | DIASTOLIC BLOOD PRESSURE: 70 MMHG | HEART RATE: 60 BPM | BODY MASS INDEX: 36.35 KG/M2 | SYSTOLIC BLOOD PRESSURE: 124 MMHG | TEMPERATURE: 97.5 F | RESPIRATION RATE: 18 BRPM | WEIGHT: 268 LBS

## 2023-07-11 DIAGNOSIS — C61 PROSTATE CANCER (HCC): Primary | ICD-10-CM

## 2023-07-11 DIAGNOSIS — C61 PROSTATE CANCER (HCC): ICD-10-CM

## 2023-07-11 PROCEDURE — 99205 OFFICE O/P NEW HI 60 MIN: CPT | Performed by: RADIOLOGY

## 2023-07-11 PROCEDURE — 99211 OFF/OP EST MAY X REQ PHY/QHP: CPT | Performed by: RADIOLOGY

## 2023-07-11 NOTE — PROGRESS NOTES
Meche Gonsalves 1955 is a 70 y.o. male who was diagnosed with prostate cancer, Carl 3+4=7. He was referred to Urology for elevated PSA and underwent transperineal MRI fusion prostate biopsy. He is being referred by Dr. Kofi Holley and presents today for consult. 3/1/23 Urology, [place]  PSA (23) 4.44, previously 3.69 (22)  - WILLIS today limited   - Discussed rising PSA with patient and recommendations for repeat PSA and MRI of prostate for prostate biopsy planning  - Return to review    3/22/23 mpMRI prostate  1. PI-RADSv2.1 Category 4 -High (clinically significant cancer is likely to be present). 1.5 cm right anterior transitional zone lesion at the base to mid gland. 2. No extraprostatic tumor, seminal vesicle invasion, pelvic lymphadenopathy, or pelvic osseous metastatic disease. 3. Calculated prostate volume of 41 cc.   4.  Diffusely trabeculated urinary bladder wall with small diverticulum at the dome     23 [facility]  MRI showing PIRADS 4 lesions, follow up for fusion guided bx    23 TRANSPERINEAL MRI FUSION BIOPSY PROSTATE     23 Dr. Kofi Holley  discussed his pathology from his prostate biopsy   discussed the pre, sean, and post operative care for robotic radical prostatectomy. I am concerned that he is not an ideal surgical candidate   will have him speak with radiation oncology as well   I recommend radiation therapy      Component PSA, Total   Latest Ref Rng & Units < OR = 4.00 ng/mL   2021 3.0   8/15/2022 4.09 (H)   2022 3.69   2023 4.44 (H)       Upcomin23 Dr. Kofi Holley      Oncology History   Prostate cancer [facility])   2023 Biopsy    TRANSPERINEAL MRI FUSION BIOPSY PROSTATE     A. Prostate, GUILHERME R ant base x 5:  - Prostatic adenocarcinoma, acinar type, Paradise score 3 + 4 = 7, Prognostic Grade Group 2, discontinuously involving 4 of 5 cores (70%, 30%, 5%, <5%, 0%). - Percentage of Carl pattern 4: 20%  - Perineural invasion: Not identified     B. Prostate, R ant med x 2:  - Benign prostate tissue. C. Prostate, R ant lat x 2:  - Benign prostate tissue. D. Prostate, L ant lat x 2:  - Benign prostate tissue. E. Prostate, L ant med x 2:  - Benign prostate tissue. F. Prostate, L post lat x 2:  - Benign prostate tissue. G. Prostate, L post med x 2:  - Benign prostate tissue. H. Prostate, L base x 2:  - Benign prostate tissue. I. Prostate, R post lat x 2:  - Benign prostate tissue. J. Prostate, R post med x 2:  - Benign prostate tissue. K. Prostate, R base x 2:  - Benign prostate tissue. 6/27/2023 Initial Diagnosis    Prostate cancer ([address])         Review of Systems:  Review of Systems   Constitutional: Positive for fatigue. HENT: Positive for postnasal drip. Eyes: Negative. Respiratory: Positive for shortness of breath. O2 @ 2 L  COPD   Cardiovascular: Positive for leg swelling. Gastrointestinal: Negative. Genitourinary:        Weak urinary stream   Musculoskeletal: Positive for back pain. Skin: Negative. Allergic/Immunologic: Negative. Neurological: Positive for numbness (B/L feet). Hematological: Negative. Psychiatric/Behavioral: Negative. Clinical Trial: no    IPSS Questionnaire (AUA-7): Over the past month…    1)  How often have you had a sensation of not emptying your bladder completely after you finish urinating? 3 - About half the time   2)  How often have you had to urinate again less than two hours after you finished urinating? 1 - Less than 1 time in 5   3)  How often have you found you stopped and started again several times when you urinated? 0 - Not at all   4) How difficult have you found it to postpone urination? 0 - Not at all   5) How often have you had a weak urinary stream?  5 - Almost always   6) How often have you had to push or strain to begin urination?   0 - Not at all   7) How many times did you most typically get up to urinate from the time you went to bed until the time you got up in the morning?   1 - 1 time   Total Score:  10       Pain assessment: 0    PFT N/A    Prior Radiation no    Teaching NIH book, side effects, SIM      MST: completed    Implantable Devices (Port, pacemaker, pain stimulator) no    Hip Replacement no    Health Maintenance   Topic Date Due   • DM Eye Exam  09/17/2021   • COVID-19 Vaccine (5 - Pfizer series) 05/27/2022   • Pneumococcal Vaccine: 65+ Years (3 - PPSV23 if available, else PCV20) 12/12/2022   • BMI: Followup Plan  02/14/2023   • Kidney Health Evaluation: Albumin/Creatinine Ratio  08/15/2023   • HEMOGLOBIN A1C  08/16/2023   • Influenza Vaccine (1) 09/01/2023   • Fall Risk  02/23/2024   • Depression Screening  02/23/2024   • Medicare Annual Wellness Visit (AWV)  02/23/2024   • Diabetic Foot Exam  02/23/2024   • Kidney Health Evaluation: GFR  05/26/2024   • BMI: Adult  06/27/2024   • Colorectal Cancer Screening  06/28/2029   • HIB Vaccine  Aged Out   • IPV Vaccine  Aged Out   • Hepatitis A Vaccine  Aged Out   • Meningococcal ACWY Vaccine  Aged Out   • HPV Vaccine  Aged Out   • Hepatitis C Screening  Discontinued       Past Medical History:   Diagnosis Date   • Colon polyp    • COPD (chronic obstructive pulmonary disease) ([address])    • Elevated PSA    • Erectile dysfunction    • H/O colonoscopy 08/22/2011    DESC 8/22/2011   • Hyperlipidemia    • Hypertension    • Lower urinary tract symptoms (LUTS)    • Pulmonary emboli (HCC)     on Xarelto   • Shortness of breath    • Sleep apnea    • Synovitis and tenosynovitis        Past Surgical History:   Procedure Laterality Date   • BACK SURGERY     • COLONOSCOPY     • HERNIA REPAIR     • KNEE SURGERY Right    • LUMBAR LAMINECTOMY     • CO BX PROSTATE STRTCTC SATURATION SAMPLING IMG GID N/A 6/13/2023    Procedure: TRANSPERINEAL MRI FUSION BIOPSY PROSTATE;  Surgeon: Benoit Lawson MD;  Location: [facility];  Service: Urology       Family History   Problem Relation Age of Onset   • Alzheimer's disease Mother    • Lung cancer Father    • Multiple sclerosis Father        Social History     Tobacco Use   • Smoking status: Former     Passive exposure: Past   • Smokeless tobacco: Never   • Tobacco comments:     Quit 2013   Vaping Use   • Vaping Use: Never used   Substance Use Topics   • Alcohol use: Yes     Comment: 2 shots a year, seldom   • Drug use: No          Current Outpatient Medications:   •  albuterol (PROVENTIL HFA,VENTOLIN HFA) 90 mcg/act inhaler, Inhale 2 puffs every 4 (four) hours as needed, Disp: , Rfl:   •  allopurinol (ZYLOPRIM) 100 mg tablet, TAKE ONE TABLET BY MOUTH DAILY, Disp: 90 tablet, Rfl: 5  •  atorvastatin (LIPITOR) 40 mg tablet, Take 1 tablet (40 mg total) by mouth daily, Disp: 90 tablet, Rfl: 3  •  diazepam (VALIUM) 5 mg tablet, TAKE 1 TABLET (5 MG TOTAL) BY MOUTH 2 (TWO) TIMES A DAY, Disp: 180 tablet, Rfl: 1  •  fenofibrate micronized (LOFIBRA) 134 MG capsule, Take 1 capsule (134 mg total) by mouth daily, Disp: 90 capsule, Rfl: 3  •  finasteride (PROSCAR) 5 mg tablet, Take 1 tablet (5 mg total) by mouth daily, Disp: 90 tablet, Rfl: 3  •  metoprolol succinate (TOPROL-XL) 50 mg 24 hr tablet, TAKE 1 TABLET (50 MG TOTAL) BY MOUTH DAILY, Disp: 90 tablet, Rfl: 3  •  metroNIDAZOLE (METROGEL) 0.75 % gel, Apply topically 2 (two) times a day, Disp: 45 g, Rfl: 5  •  Multiple Vitamin (DAILY VALUE MULTIVITAMIN) TABS, Take 1 tablet by mouth daily, Disp: , Rfl:   •  Omega-3 Fatty Acids (fish oil) 1,000 mg, Take 1,000 mg by mouth daily, Disp: , Rfl:   •  pregabalin (LYRICA) 150 mg capsule, TAKE 1 CAPSULE (150 MG TOTAL) BY MOUTH 2 (TWO) TIMES A DAY, Disp: 180 capsule, Rfl: 1  •  tadalafil (CIALIS) 20 MG tablet, Take 1 tablet (20 mg total) by mouth daily as needed for erectile dysfunction, Disp: 10 tablet, Rfl: 10  •  tamsulosin (FLOMAX) 0.4 mg, Take 2 capsules (0.8 mg total) by mouth daily with dinner (Patient taking differently: Take 0.8 mg by mouth 2 (two) times a day), Disp: 180 capsule, Rfl: 3  •  Trelegy Ellipta 100-62.5-25 MCG/INH inhaler, Inhale 1 puff daily, Disp: , Rfl:   •  triamcinolone (KENALOG) 0.5 % cream, Apply topically 3 (three) times a day, Disp: 30 g, Rfl: 5  •  Xarelto 20 MG tablet, TAKE ONE TABLET BY MOUTH DAILY, Disp: 90 tablet, Rfl: 3    Allergies   Allergen Reactions   • Penicillins Hives        There were no vitals filed for this visit.

## 2023-07-11 NOTE — PROGRESS NOTES
Consultation - Radiation Oncology      BIB:[id number] : 1955  Encounter: [id number]  Patient Information: [address]  Chief Complaint   Patient presents with   • Prostate Cancer   • Consult     Cancer Staging   Prostate cancer [facility])  Staging form: Prostate, AJCC 8th Edition  - Clinical stage from 2023: Stage IIB (cT1c, cN0, cM0, PSA: 4.4, Grade Group: 2) - Signed by Simón Brower MD on 2023  Prostate specific antigen (PSA) range: Less than 10  Carl primary pattern: 3  Westpoint secondary pattern: 4  Westpoint score: 7  Histologic grading system: 5 grade system  Location of positive needle core biopsies: One side         History of Present Illness   Anatoliy Madera is a 70 y.o. man with multiple comorbidities including COPD requiring O2 supplementation, PE on Xarelto and CKD who was recently diagnosed with clinical stage T1c prostate cancer, Westpoint 7(3+4) and pretreatment PSA of 4.44ng/mL. He was referred for consideration for definitive radiation. The patient was noted with rising PSA prompting MRI evaluation. Component PSA, Total   Latest Ref Rng & Units < OR = 4.00 ng/mL   2021 3.0   8/15/2022 4.09 (H)   2022 3.69   2023 4.44 (H)        3/1/23 Urology, [facility]  - PSA (23) 4.44, previously 3.69 (22)  - WILLIS today limited   - Discussed rising PSA with patient and recommendations for repeat PSA and MRI of prostate for prostate biopsy planning     3/22/23 mpMRI prostate  1. PI-RADSv2.1 Category 4 -High (clinically significant cancer is likely to be present).  1.5 cm right anterior transitional zone lesion at the base to mid gland.   2. No extraprostatic tumor, seminal vesicle invasion, pelvic lymphadenopathy, or pelvic osseous metastatic disease.   3. Calculated prostate volume of 41 cc.   4.  Diffusely trabeculated urinary bladder wall with small diverticulum at the dome      23 TRANSPERINEAL MRI FUSION BIOPSY PROSTATE   Pathology demonstrated prostate adenocarcinoma, GS 7 (3+4) involving 1/21cores from the region of interest in the right anterior base. There was a total of 4 cores of 5 in this area involving <5% to 70% of the submitted tissue. There was no PNI.       23 Dr. Cj Mckeon  Reviewed his pathology and treatment options including active surveillance versus robotic radical prostatectomy. He was felt not an ideal surgical candidate   I recommend radiation therapy    The patient has a long history of lower urinary tract symptoms. He has weak stream, hesitancy, and some difficulty emptying his bladder fully due to these symptoms. He is eventually able to empty his bladder fully. IPSS score 10. Nocturia 1x a night. He denies dysuria, hematuria, or incontinence. He denies diarrhea or rectal bleeding. He has a history of polyps and has had multiple colonoscopies. The most recent on record was in 2019 and he is due again in . He denies new or worsening bone pain.     He is not sexually active at this time. [facility] erectile function: 1/3 to 23 with Cialis.     Upcomin23 Dr. Armijo Canal   Oncology History   Prostate cancer [facility])   2023 Biopsy    TRANSPERINEAL MRI FUSION BIOPSY PROSTATE     A. Prostate, GUILHERME R ant base x 5:  - Prostatic adenocarcinoma, acinar type, Carl score 3 + 4 = 7, Prognostic Grade Group 2, discontinuously involving 4 of 5 cores (70%, 30%, 5%, <5%, 0%). - Percentage of Carl pattern 4: 20%  - Perineural invasion: Not identified     B. Prostate, R ant med x 2:  - Benign prostate tissue. C. Prostate, R ant lat x 2:  - Benign prostate tissue. D. Prostate, L ant lat x 2:  - Benign prostate tissue. E. Prostate, L ant med x 2:  - Benign prostate tissue. F. Prostate, L post lat x 2:  - Benign prostate tissue. G. Prostate, L post med x 2:  - Benign prostate tissue. H. Prostate, L base x 2:  - Benign prostate tissue.       I. Prostate, R post lat x 2:  - Benign prostate tissue. J. Prostate, R post med x 2:  - Benign prostate tissue. K. Prostate, R base x 2:  - Benign prostate tissue.      6/27/2023 Initial Diagnosis    Prostate cancer [facility])           Past Medical History:   Diagnosis Date   • Colon polyp    • COPD (chronic obstructive pulmonary disease) ([address])    • Elevated PSA    • Erectile dysfunction    • H/O colonoscopy 08/22/2011    DESC 8/22/2011   • Hyperlipidemia    • Hypertension    • Lower urinary tract symptoms (LUTS)    • Prostate cancer ([address])    • Pulmonary emboli (HCC)     on Xarelto   • Shortness of breath    • Sleep apnea    • Synovitis and tenosynovitis      Past Surgical History:   Procedure Laterality Date   • BACK SURGERY     • COLONOSCOPY     • HERNIA REPAIR     • KNEE SURGERY Right    • LUMBAR LAMINECTOMY     • CO BX PROSTATE STRTCTC SATURATION SAMPLING IMG GID N/A 6/13/2023    Procedure: TRANSPERINEAL MRI FUSION BIOPSY PROSTATE;  Surgeon: Jonathan Ayala MD;  Location: [facility];  Service: Urology       Family History   Problem Relation Age of Onset   • Alzheimer's disease Mother    • Lung cancer Father    • Multiple sclerosis Father    • Leukemia Sister        Social History   Social History     Substance and Sexual Activity   Alcohol Use Yes    Comment: 2 shots a year, seldom     Social History     Substance and Sexual Activity   Drug Use No     Social History     Tobacco Use   Smoking Status Former   • Passive exposure: Past   Smokeless Tobacco Never   Tobacco Comments    Quit 2013         Meds/Allergies     Current Outpatient Medications:   •  albuterol (PROVENTIL HFA,VENTOLIN HFA) 90 mcg/act inhaler, Inhale 2 puffs every 4 (four) hours as needed, Disp: , Rfl:   •  allopurinol (ZYLOPRIM) 100 mg tablet, TAKE ONE TABLET BY MOUTH DAILY, Disp: 90 tablet, Rfl: 5  •  atorvastatin (LIPITOR) 40 mg tablet, Take 1 tablet (40 mg total) by mouth daily, Disp: 90 tablet, Rfl: 3  •  diazepam (VALIUM) 5 mg tablet, TAKE 1 TABLET (5 MG TOTAL) BY MOUTH 2 (TWO) TIMES A DAY, Disp: 180 tablet, Rfl: 1  •  fenofibrate micronized (LOFIBRA) 134 MG capsule, Take 1 capsule (134 mg total) by mouth daily, Disp: 90 capsule, Rfl: 3  •  finasteride (PROSCAR) 5 mg tablet, Take 1 tablet (5 mg total) by mouth daily, Disp: 90 tablet, Rfl: 3  •  metoprolol succinate (TOPROL-XL) 50 mg 24 hr tablet, TAKE 1 TABLET (50 MG TOTAL) BY MOUTH DAILY, Disp: 90 tablet, Rfl: 3  •  metroNIDAZOLE (METROGEL) 0.75 % gel, Apply topically 2 (two) times a day, Disp: 45 g, Rfl: 5  •  Multiple Vitamin (DAILY VALUE MULTIVITAMIN) TABS, Take 1 tablet by mouth daily, Disp: , Rfl:   •  Omega-3 Fatty Acids (fish oil) 1,000 mg, Take 1,000 mg by mouth daily, Disp: , Rfl:   •  pregabalin (LYRICA) 150 mg capsule, TAKE 1 CAPSULE (150 MG TOTAL) BY MOUTH 2 (TWO) TIMES A DAY, Disp: 180 capsule, Rfl: 1  •  tadalafil (CIALIS) 20 MG tablet, Take 1 tablet (20 mg total) by mouth daily as needed for erectile dysfunction, Disp: 10 tablet, Rfl: 10  •  tamsulosin (FLOMAX) 0.4 mg, Take 2 capsules (0.8 mg total) by mouth daily with dinner (Patient taking differently: Take 0.8 mg by mouth 2 (two) times a day), Disp: 180 capsule, Rfl: 3  •  Trelegy Ellipta 100-62.5-25 MCG/INH inhaler, Inhale 1 puff daily, Disp: , Rfl:   •  triamcinolone (KENALOG) 0.5 % cream, Apply topically 3 (three) times a day, Disp: 30 g, Rfl: 5  •  Xarelto 20 MG tablet, TAKE ONE TABLET BY MOUTH DAILY, Disp: 90 tablet, Rfl: 3  Allergies   Allergen Reactions   • Penicillins Hives         Review of Systems   Constitutional: Positive for fatigue. HENT: Positive for postnasal drip. Eyes: Negative. Respiratory: Positive for shortness of breath. O2 @ 2 L  COPD   Cardiovascular: Positive for leg swelling. Gastrointestinal: Negative. Genitourinary:        Weak urinary stream   Musculoskeletal: Positive for back pain. Skin: Negative. Allergic/Immunologic: Negative. Neurological: Positive for numbness (B/L feet).    Hematological: Negative. Psychiatric/Behavioral: Negative.          IPSS Questionnaire (AUA-7): Over the past month…     1)  How often have you had a sensation of not emptying your bladder completely after you finish urinating? 3 - About half the time   2)  How often have you had to urinate again less than two hours after you finished urinating? 1 - Less than 1 time in 5   3)  How often have you found you stopped and started again several times when you urinated? 0 - Not at all   4) How difficult have you found it to postpone urination? 0 - Not at all   5) How often have you had a weak urinary stream?  5 - Almost always   6) How often have you had to push or strain to begin urination? 0 - Not at all   7) How many times did you most typically get up to urinate from the time you went to bed until the time you got up in the morning? 1 - 1 time   Total Score:  10         OBJECTIVE:   /70   Pulse 60   Temp 97.5 °F (36.4 °C)   Resp 18   Wt 122 kg (268 lb)   SpO2 94%   BMI 36.35 kg/m²   Performance Status: Karnofsky: 80 - Normal activity with effort; some signs or symptoms of disease    Physical Exam  Vitals and nursing note reviewed. Constitutional:       General: He is not in acute distress. Appearance: He is well-developed. Cardiovascular:      Rate and Rhythm: Normal rate and regular rhythm. Pulmonary:      Breath sounds: No wheezing, rhonchi or rales. Comments: Distant breath sounds throughout  Abdominal:      General: There is no distension. Palpations: Abdomen is soft. Tenderness: There is no abdominal tenderness. There is no right CVA tenderness or left CVA tenderness. Genitourinary:     Comments: Rectal examination demonstrated normal external sphincter tone. It was smooth, flat, and high riding. There were no palpable nodules appreciated. There is no blood in the examining finger. Musculoskeletal:      Right lower leg: No edema. Left lower leg: No edema.       Comments: No spinal tenderness to percussion   Lymphadenopathy:      Cervical: No cervical adenopathy. Upper Body:      Right upper body: No supraclavicular adenopathy. Left upper body: No supraclavicular adenopathy. Lower Body: No right inguinal adenopathy. No left inguinal adenopathy. Neurological:      Mental Status: He is alert and oriented to person, place, and time. Gait: Gait normal.          RESULTS  Lab Results  Lab Results   Component Value Date    PSA 4.44 (H) 02/16/2023    PSA 3.69 09/22/2022    PSA 4.09 (H) 08/15/2022       Pathology:   Collected 6/13/2023 10:59      Status: Final result      Visible to patient: Yes (seen)      Dx:  Elevated prostate specific antigen (PSA)      0 Result Notes      Component    Case Report   Surgical Pathology Report                         Case: [id number]                                    Authorizing Provider: Grace Rascon MD        Collected:           06/13/2023 1059               Ordering Location:     [facility]      Received:            06/13/2023 [facility]                                      [facility]                                                            Pathologist:           Josefa Jolly MD                                                                  Specimens:   A) - Prostate, GUILHERME R ant base x 5                                                                    B) - Prostate, R ant med x 2                                                                         C) - Prostate, R ant lat x 2                                                                         D) - Prostate, L ant lat x 2                                                                         E) - Prostate, L ant med x 2                                                                         F) - Prostate, L post lat x 2                                                                        G) - Prostate, L post med x 2                                                                        H) - Prostate, L base x 2                                                                            I) - Prostate, R post lat x 2                                                                        J) - Prostate, R post med x 2                                                                        K) - Prostate, R base x 2                                                                  Final Diagnosis   A. Prostate, GUILHERME R ant base x 5:  - Prostatic adenocarcinoma, acinar type, Deputy score 3 + 4 = 7, Prognostic Grade Group 2, discontinuously involving 4 of 5 cores (70%, 30%, 5%, <5%, 0%). - Percentage of Deputy pattern 4: 20%  - Perineural invasion: Not identified     B. Prostate, R ant med x 2:  - Benign prostate tissue.      C. Prostate, R ant lat x 2:  - Benign prostate tissue.      D. Prostate, L ant lat x 2:  - Benign prostate tissue.      E. Prostate, L ant med x 2:  - Benign prostate tissue.      F. Prostate, L post lat x 2:  - Benign prostate tissue.      G. Prostate, L post med x 2:  - Benign prostate tissue.      H. Prostate, L base x 2:  - Benign prostate tissue.      I. Prostate, R post lat x 2:  - Benign prostate tissue.      J. Prostate, R post med x 2:  - Benign prostate tissue.      K. Prostate, R base x 2:  - Benign prostate tissue.     Comment: Immunohistochemistry for a prostate multiplex stain (p63, K903, and P504S) and NKX3.1 is consistent with the diagnosis.     Block/unstained slides from Block A2 are suggested if additional studies are indicated. Electronically signed by Yary Giordano MD on 6/15/2023 at 1440              ASSESSMENT  1.  Prostate cancer [facility])  Ambulatory Referral to Radiation Oncology        Cancer Staging   Prostate cancer [facility])  Staging form: Prostate, AJCC 8th Edition  - Clinical stage from 6/13/2023: Stage IIB (cT1c, cN0, cM0, PSA: 4.4, Grade Group: 2) - Signed by Breanne Last MD on 7/11/2023  Prostate specific antigen (PSA) range: Less than 10  Carl primary pattern: 3  [place] secondary pattern: 4  [place] score: 7  Histologic grading system: 5 grade system  Location of positive needle core biopsies: One side        PLAN/DISCUSSION  Chelsea David is a 70 y.o. man with multiple comorbidities including COPD requiring O2 supplementation, PE on Xarelto and CKD who was recently diagnosed with clinical stage T1c prostate cancer, Carl 7(3+4) and pretreatment PSA of 4.44ng/mL. MRI imaging demonstrated no extraprostatic extension or lymphadenopathy. His clinical presentation is consistent with favorable intermediate risk prostate cancer as per AUA and NCCN guidelines. We discussed the natural history of prostate cancer and good prognosis of his diagnosis. We discussed treatment options including active surveillance, definitive radiation with moderate hypofractionation with or without short course ADT, brachytherapy, and SBRT. He had previously discussed surgery at length with Dr. Chrissy Nieves. Given his comorbidities, active surveillance would be a reasonable option, but the patient deferred as diagnosis is associated with significant anxiety. I offered him definitive radiation to the prostate and proximal seminal vesicles to a total dose of 70Gy in 28 fractions. I feel that this would offer the patient benefit in terms of local control and possible cure. This is in accordance with NCCN guideline. As an intermediate risk patient we discussed short course ADT could provide small benefit in terms of local control and biochemical progression free survival, but the benefit would be small. Given his comorbidities I feel that benefit would be minimal and deferral very reasonable.       The rationale and potential benefits, as well as the risks and acute and late side effects and potential toxicities of radiation were discussed with the patient at length. Side effects discussed included, but were not limited to: Fatigue, irritative urinary side effects, diarrhea, rectal urgency, radiation proctitis, erectile dysfunction, and radiation cystitis. Alternative radiation treatment options were discussed with the patient including brachytherapy. I feel that he could be considered for brachytherapy. We discussed some of the pros and cons of brachytherapy versus external beam radiation. I explained that we do not perform this procedure at [facility], but we would be happy to refer to tertiary center for treatment. He deferred. We discussed SBRT as well noting that this treatment is becoming increasingly accepted as definitive treatment for low risk or favorable intermediate risk prostate cancer. Treatment would be done in 5 fractions. Data is more limited than other modalities discussed, but has been favorable overall. The patient was given the opportunity to ask questions and all questions were answered to his satisfaction. He wished to proceed with definitive radiation with external beam radiation to 70Gy without ADT as offered. I feel this is reasonable. He is a candidate for SpaceOAR in an effort to reduce risk of radiation proctitis. We referred him back for fiducial marker and SpaceOAR placement. We will plan to coordinate with Urology going forward. Total Time Spent  62 minutes spent reviewing EMR in preparation for visit, with the patient, coordination with other providers, and documentation. Greater than 50% of total time was spent with the patient and/or family for counseling and/or coordination of care. Chung Carty MD  8/23/2634,6:44 PM      Portions of the record may have been created with voice recognition software. Occasional wrong word or "sound a like" substitutions may have occurred due to the inherent limitations of voice recognition software.   Read the chart carefully and recognize, using context, where substitutions have occurred.

## 2023-07-11 NOTE — LETTER
2023     Elmer Holliday, [address] [facility]  [address] [facility]  [facility]    Patient: Erin Longoria   YOB: 1955   Date of Visit: 2023       Dear Dr. Santos November: Thank you for referring Bird Tipton to me for evaluation. Below are my notes for this consultation. If you have questions, please do not hesitate to call me. I look forward to following your patient along with you. Sincerely,        Brock Patel MD        CC: No Recipients    Brock Patel MD  2023  6:45 PM  Sign when Signing Visit  Consultation - Radiation Oncology      GFA:[id number] : 1955  Encounter: [id number]  Patient Information: [address] 12  Chief Complaint   Patient presents with   • Prostate Cancer   • Consult     Cancer Staging   Prostate cancer [facility])  Staging form: Prostate, AJCC 8th Edition  - Clinical stage from 2023: Stage IIB (cT1c, cN0, cM0, PSA: 4.4, Grade Group: 2) - Signed by Brock Patel MD on 2023  Prostate specific antigen (PSA) range: Less than 10  [place] primary pattern: 3  Carl secondary pattern: 4  Carl score: 7  Histologic grading system: 5 grade system  Location of positive needle core biopsies: One side         History of Present Illness   Erin Longoria is a 70 y.o. man with multiple comorbidities including COPD requiring O2 supplementation, PE on Xarelto and CKD who was recently diagnosed with clinical stage T1c prostate cancer, Carl 7(3+4) and pretreatment PSA of 4.44ng/mL. He was referred for consideration for definitive radiation. The patient was noted with rising PSA prompting MRI evaluation.   Component PSA, Total   Latest Ref Rng & Units < OR = 4.00 ng/mL   2021 3.0   8/15/2022 4.09 (H)   2022 3.69   2023 4.44 (H)        3/1/23 Urology, [place]  - PSA (23) 4.44, previously 3.69 (22)  - WILLIS today limited   - Discussed rising PSA with patient and recommendations for repeat PSA and MRI of prostate for prostate biopsy planning     3/22/23 mpMRI prostate  1. PI-RADSv2.1 Category 4 -High (clinically significant cancer is likely to be present). 1.5 cm right anterior transitional zone lesion at the base to mid gland. 2. No extraprostatic tumor, seminal vesicle invasion, pelvic lymphadenopathy, or pelvic osseous metastatic disease. 3. Calculated prostate volume of 41 cc.   4.  Diffusely trabeculated urinary bladder wall with small diverticulum at the dome      23 TRANSPERINEAL MRI FUSION BIOPSY PROSTATE   Pathology demonstrated prostate adenocarcinoma, GS 7 (3+4) involving 1/21cores from the region of interest in the right anterior base. There was a total of 4 cores of 5 in this area involving <5% to 70% of the submitted tissue. There was no PNI. 23 Dr. Roshni Live  Reviewed his pathology and treatment options including active surveillance versus robotic radical prostatectomy. He was felt not an ideal surgical candidate   I recommend radiation therapy    The patient has a long history of lower urinary tract symptoms. He has weak stream, hesitancy, and some difficulty emptying his bladder fully due to these symptoms. He is eventually able to empty his bladder fully. IPSS score 10. Nocturia 1x a night. He denies dysuria, hematuria, or incontinence. He denies diarrhea or rectal bleeding. He has a history of polyps and has had multiple colonoscopies. The most recent on record was in 2019 and he is due again in . He denies new or worsening bone pain. He is not sexually active at this time. [facility] erectile function: 1/3 to 2/3 with Cialis. Upcomin23 Dr. Cardoza Must   Oncology History   Prostate cancer [facility])   2023 Biopsy    TRANSPERINEAL MRI FUSION BIOPSY PROSTATE     A.  Prostate, GUILHERME R ant base x 5:  - Prostatic adenocarcinoma, acinar type, [place] score 3 + 4 = 7, Prognostic Grade Group 2, discontinuously involving 4 of 5 cores (70%, 30%, 5%, <5%, 0%). - Percentage of Carl pattern 4: 20%  - Perineural invasion: Not identified     B. Prostate, R ant med x 2:  - Benign prostate tissue. C. Prostate, R ant lat x 2:  - Benign prostate tissue. D. Prostate, L ant lat x 2:  - Benign prostate tissue. E. Prostate, L ant med x 2:  - Benign prostate tissue. F. Prostate, L post lat x 2:  - Benign prostate tissue. G. Prostate, L post med x 2:  - Benign prostate tissue. H. Prostate, L base x 2:  - Benign prostate tissue. I. Prostate, R post lat x 2:  - Benign prostate tissue. J. Prostate, R post med x 2:  - Benign prostate tissue. K. Prostate, R base x 2:  - Benign prostate tissue.      6/27/2023 Initial Diagnosis    Prostate cancer [facility])           Past Medical History:   Diagnosis Date   • Colon polyp    • COPD (chronic obstructive pulmonary disease) ([address])    • Elevated PSA    • Erectile dysfunction    • H/O colonoscopy 08/22/2011    DESC 8/22/2011   • Hyperlipidemia    • Hypertension    • Lower urinary tract symptoms (LUTS)    • Prostate cancer ([address])    • Pulmonary emboli ([facility])     on Xarelto   • Shortness of breath    • Sleep apnea    • Synovitis and tenosynovitis      Past Surgical History:   Procedure Laterality Date   • BACK SURGERY     • COLONOSCOPY     • HERNIA REPAIR     • KNEE SURGERY Right    • LUMBAR LAMINECTOMY     • NJ BX PROSTATE STRTCTC SATURATION SAMPLING IMG GID N/A 6/13/2023    Procedure: TRANSPERINEAL MRI FUSION BIOPSY PROSTATE;  Surgeon: Jonathan Ayala MD;  Location: [facility];  Service: Urology       Family History   Problem Relation Age of Onset   • Alzheimer's disease Mother    • Lung cancer Father    • Multiple sclerosis Father    • Leukemia Sister        Social History   Social History     Substance and Sexual Activity   Alcohol Use Yes    Comment: 2 shots a year, seldom     Social History     Substance and Sexual Activity   Drug Use No     Social History     Tobacco Use   Smoking Status Former   • Passive exposure: Past   Smokeless Tobacco Never   Tobacco Comments    Quit 2013         Meds/Allergies     Current Outpatient Medications:   •  albuterol (PROVENTIL HFA,VENTOLIN HFA) 90 mcg/act inhaler, Inhale 2 puffs every 4 (four) hours as needed, Disp: , Rfl:   •  allopurinol (ZYLOPRIM) 100 mg tablet, TAKE ONE TABLET BY MOUTH DAILY, Disp: 90 tablet, Rfl: 5  •  atorvastatin (LIPITOR) 40 mg tablet, Take 1 tablet (40 mg total) by mouth daily, Disp: 90 tablet, Rfl: 3  •  diazepam (VALIUM) 5 mg tablet, TAKE 1 TABLET (5 MG TOTAL) BY MOUTH 2 (TWO) TIMES A DAY, Disp: 180 tablet, Rfl: 1  •  fenofibrate micronized (LOFIBRA) 134 MG capsule, Take 1 capsule (134 mg total) by mouth daily, Disp: 90 capsule, Rfl: 3  •  finasteride (PROSCAR) 5 mg tablet, Take 1 tablet (5 mg total) by mouth daily, Disp: 90 tablet, Rfl: 3  •  metoprolol succinate (TOPROL-XL) 50 mg 24 hr tablet, TAKE 1 TABLET (50 MG TOTAL) BY MOUTH DAILY, Disp: 90 tablet, Rfl: 3  •  metroNIDAZOLE (METROGEL) 0.75 % gel, Apply topically 2 (two) times a day, Disp: 45 g, Rfl: 5  •  Multiple Vitamin (DAILY VALUE MULTIVITAMIN) TABS, Take 1 tablet by mouth daily, Disp: , Rfl:   •  Omega-3 Fatty Acids (fish oil) 1,000 mg, Take 1,000 mg by mouth daily, Disp: , Rfl:   •  pregabalin (LYRICA) 150 mg capsule, TAKE 1 CAPSULE (150 MG TOTAL) BY MOUTH 2 (TWO) TIMES A DAY, Disp: 180 capsule, Rfl: 1  •  tadalafil (CIALIS) 20 MG tablet, Take 1 tablet (20 mg total) by mouth daily as needed for erectile dysfunction, Disp: 10 tablet, Rfl: 10  •  tamsulosin (FLOMAX) 0.4 mg, Take 2 capsules (0.8 mg total) by mouth daily with dinner (Patient taking differently: Take 0.8 mg by mouth 2 (two) times a day), Disp: 180 capsule, Rfl: 3  •  Trelegy Ellipta 100-62.5-25 MCG/INH inhaler, Inhale 1 puff daily, Disp: , Rfl:   •  triamcinolone (KENALOG) 0.5 % cream, Apply topically 3 (three) times a day, Disp: 30 g, Rfl: 5  •  Xarelto 20 MG tablet, TAKE ONE TABLET BY MOUTH DAILY, Disp: 90 tablet, Rfl: 3  Allergies   Allergen Reactions   • Penicillins Hives         Review of Systems   Constitutional: Positive for fatigue. HENT: Positive for postnasal drip. Eyes: Negative. Respiratory: Positive for shortness of breath. O2 @ 2 L  COPD   Cardiovascular: Positive for leg swelling. Gastrointestinal: Negative. Genitourinary:        Weak urinary stream   Musculoskeletal: Positive for back pain. Skin: Negative. Allergic/Immunologic: Negative. Neurological: Positive for numbness (B/L feet). Hematological: Negative. Psychiatric/Behavioral: Negative. IPSS Questionnaire (AUA-7): Over the past month…     1)  How often have you had a sensation of not emptying your bladder completely after you finish urinating? 3 - About half the time   2)  How often have you had to urinate again less than two hours after you finished urinating? 1 - Less than 1 time in 5   3)  How often have you found you stopped and started again several times when you urinated? 0 - Not at all   4) How difficult have you found it to postpone urination? 0 - Not at all   5) How often have you had a weak urinary stream?  5 - Almost always   6) How often have you had to push or strain to begin urination? 0 - Not at all   7) How many times did you most typically get up to urinate from the time you went to bed until the time you got up in the morning? 1 - 1 time   Total Score:  10         OBJECTIVE:   /70   Pulse 60   Temp 97.5 °F (36.4 °C)   Resp 18   Wt 122 kg (268 lb)   SpO2 94%   BMI 36.35 kg/m²   Performance Status: Karnofsky: 80 - Normal activity with effort; some signs or symptoms of disease    Physical Exam  Vitals and nursing note reviewed. Constitutional:       General: He is not in acute distress. Appearance: He is well-developed. Cardiovascular:      Rate and Rhythm: Normal rate and regular rhythm. Pulmonary:      Breath sounds: No wheezing, rhonchi or rales. Comments: Distant breath sounds throughout  Abdominal:      General: There is no distension. Palpations: Abdomen is soft. Tenderness: There is no abdominal tenderness. There is no right CVA tenderness or left CVA tenderness. Genitourinary:     Comments: Rectal examination demonstrated normal external sphincter tone. It was smooth, flat, and high riding. There were no palpable nodules appreciated. There is no blood in the examining finger. Musculoskeletal:      Right lower leg: No edema. Left lower leg: No edema. Comments: No spinal tenderness to percussion   Lymphadenopathy:      Cervical: No cervical adenopathy. Upper Body:      Right upper body: No supraclavicular adenopathy. Left upper body: No supraclavicular adenopathy. Lower Body: No right inguinal adenopathy. No left inguinal adenopathy. Neurological:      Mental Status: He is alert and oriented to person, place, and time.       Gait: Gait normal.          RESULTS  Lab Results  Lab Results   Component Value Date    PSA 4.44 (H) 02/16/2023    PSA 3.69 09/22/2022    PSA 4.09 (H) 08/15/2022       Pathology:   Collected 6/13/2023 10:59      Status: Final result      Visible to patient: Yes (seen)      Dx: Elevated prostate specific antigen (PSA)      0 Result Notes      Component    Case Report   Surgical Pathology Report                         Case: [id number]                                    Authorizing Provider:  Rosa Nielson MD        Collected:           06/13/2023 1059               Ordering Location:     [facility]      Received:            06/13/2023 [address] [place] [address]  Endoscopy                                                            Pathologist:           Eloise Borges MD                                                                  Specimens:   A) - Prostate, GUILHERME R ant base x 5                                                                    B) - Prostate, R ant med x 2                                                                         C) - Prostate, R ant lat x 2                                                                         D) - Prostate, L ant lat x 2                                                                         E) - Prostate, L ant med x 2                                                                         F) - Prostate, L post lat x 2                                                                        G) - Prostate, L post med x 2                                                                        H) - Prostate, L base x 2                                                                            I) - Prostate, R post lat x 2                                                                        J) - Prostate, R post med x 2                                                                        K) - Prostate, R base x 2                                                                  Final Diagnosis   A. Prostate, GUILHERME R ant base x 5:  - Prostatic adenocarcinoma, acinar type, [place] score 3 + 4 = 7, Prognostic Grade Group 2, discontinuously involving 4 of 5 cores (70%, 30%, 5%, <5%, 0%). - Percentage of Carl pattern 4: 20%  - Perineural invasion: Not identified     B. Prostate, R ant med x 2:  - Benign prostate tissue. C. Prostate, R ant lat x 2:  - Benign prostate tissue. D. Prostate, L ant lat x 2:  - Benign prostate tissue. E. Prostate, L ant med x 2:  - Benign prostate tissue. F. Prostate, L post lat x 2:  - Benign prostate tissue. G. Prostate, L post med x 2:  - Benign prostate tissue. H. Prostate, L base x 2:  - Benign prostate tissue. I. Prostate, R post lat x 2:  - Benign prostate tissue. J. Prostate, R post med x 2:  - Benign prostate tissue.       K. Prostate, R base x 2:  - Benign prostate tissue. Comment: Immunohistochemistry for a prostate multiplex stain (p63, K903, and P504S) and NKX3.1 is consistent with the diagnosis. Block/unstained slides from Block A2 are suggested if additional studies are indicated. Electronically signed by Joann Castañeda MD on 6/15/2023 at 1440              ASSESSMENT  1. Prostate cancer [facility])  Ambulatory Referral to Radiation Oncology        Cancer Staging   Prostate cancer [facility])  Staging form: Prostate, AJCC 8th Edition  - Clinical stage from 6/13/2023: Stage IIB (cT1c, cN0, cM0, PSA: 4.4, Grade Group: 2) - Signed by Candace Franco MD on 7/11/2023  Prostate specific antigen (PSA) range: Less than 10  [place] primary pattern: 3  Carl secondary pattern: 4  Carl score: 7  Histologic grading system: 5 grade system  Location of positive needle core biopsies: One side        PLAN/DISCUSSION  Ute Enriquez is a 70 y.o. man with multiple comorbidities including COPD requiring O2 supplementation, PE on Xarelto and CKD who was recently diagnosed with clinical stage T1c prostate cancer, [place] 7(3+4) and pretreatment PSA of 4.44ng/mL. MRI imaging demonstrated no extraprostatic extension or lymphadenopathy. His clinical presentation is consistent with favorable intermediate risk prostate cancer as per AUA and NCCN guidelines. We discussed the natural history of prostate cancer and good prognosis of his diagnosis. We discussed treatment options including active surveillance, definitive radiation with moderate hypofractionation with or without short course ADT, brachytherapy, and SBRT. He had previously discussed surgery at length with Dr. Tanya Cox. Given his comorbidities, active surveillance would be a reasonable option, but the patient deferred as diagnosis is associated with significant anxiety. I offered him definitive radiation to the prostate and proximal seminal vesicles to a total dose of 70Gy in 28 fractions.   I feel that this would offer the patient benefit in terms of local control and possible cure. This is in accordance with NCCN guideline. As an intermediate risk patient we discussed short course ADT could provide small benefit in terms of local control and biochemical progression free survival, but the benefit would be small. Given his comorbidities I feel that benefit would be minimal and deferral very reasonable. The rationale and potential benefits, as well as the risks and acute and late side effects and potential toxicities of radiation were discussed with the patient at length. Side effects discussed included, but were not limited to: Fatigue, irritative urinary side effects, diarrhea, rectal urgency, radiation proctitis, erectile dysfunction, and radiation cystitis. Alternative radiation treatment options were discussed with the patient including brachytherapy. I feel that he could be considered for brachytherapy. We discussed some of the pros and cons of brachytherapy versus external beam radiation. I explained that we do not perform this procedure at [facility], but we would be happy to refer to tertiary center for treatment. He deferred. We discussed SBRT as well noting that this treatment is becoming increasingly accepted as definitive treatment for low risk or favorable intermediate risk prostate cancer. Treatment would be done in 5 fractions. Data is more limited than other modalities discussed, but has been favorable overall. The patient was given the opportunity to ask questions and all questions were answered to his satisfaction. He wished to proceed with definitive radiation with external beam radiation to 70Gy without ADT as offered. I feel this is reasonable. He is a candidate for SpaceOAR in an effort to reduce risk of radiation proctitis. We referred him back for fiducial marker and SpaceOAR placement. We will plan to coordinate with Urology going forward.     Total Time Spent  62 minutes spent reviewing EMR in preparation for visit, with the patient, coordination with other providers, and documentation. Greater than 50% of total time was spent with the patient and/or family for counseling and/or coordination of care. Abiola Salas MD  0/89/0427,7:62 PM      Portions of the record may have been created with voice recognition software. Occasional wrong word or "sound a like" substitutions may have occurred due to the inherent limitations of voice recognition software. Read the chart carefully and recognize, using context, where substitutions have occurred.

## 2023-07-11 NOTE — TELEPHONE ENCOUNTER
Enoc Nicole was seen in consult today by Dr. Maria Isabel Edwards. She is requesting SpaceOAR and fiducial markers. No ADT.

## 2023-07-12 ENCOUNTER — DOCUMENTATION (OUTPATIENT)
Dept: HEMATOLOGY ONCOLOGY | Facility: CLINIC | Age: 68
End: 2023-07-12

## 2023-07-12 DIAGNOSIS — C61 PROSTATE CANCER (HCC): Primary | ICD-10-CM

## 2023-07-12 NOTE — PROGRESS NOTES
Patient was seen by RAD ONC. They are requesting SpaceOAR/Markers and No ADT. Message sent to urology surgery scheduler to schedule SpaceOAR/Markers. I will follow up on date in the near future.

## 2023-07-17 ENCOUNTER — PATIENT OUTREACH (OUTPATIENT)
Dept: CASE MANAGEMENT | Facility: HOSPITAL | Age: 68
End: 2023-07-17

## 2023-07-17 NOTE — PROGRESS NOTES
OncSW referral received, chart review completed. Pt has consulted with Dr. Maria Isabel Edwards and wishes to proceed with radiation tx. MSW will outreach in the near future to assess for needs. No start date in place at this time.

## 2023-07-20 ENCOUNTER — TELEPHONE (OUTPATIENT)
Dept: UROLOGY | Facility: CLINIC | Age: 68
End: 2023-07-20

## 2023-07-25 ENCOUNTER — TELEPHONE (OUTPATIENT)
Dept: RADIATION ONCOLOGY | Facility: CLINIC | Age: 68
End: 2023-07-25

## 2023-07-25 ENCOUNTER — TELEPHONE (OUTPATIENT)
Dept: UROLOGY | Facility: CLINIC | Age: 68
End: 2023-07-25

## 2023-07-25 ENCOUNTER — DOCUMENTATION (OUTPATIENT)
Dept: HEMATOLOGY ONCOLOGY | Facility: CLINIC | Age: 68
End: 2023-07-25

## 2023-07-25 NOTE — TELEPHONE ENCOUNTER
Message from [facility] regarding pts appt on 8/9/23- it was stated that the pt does not need this f/u plan and to plan for a 5 month  Follow up with DV after his radiation therapy is complete .    Patient said he was to meet with Dr. Ana Laura Spann to go over his treatment plan on 8/9/23 , that he knows nothing about what is going on-  Advised patient that I would send the provider and clinical coordinator a message to check on this

## 2023-07-25 NOTE — PROGRESS NOTES
Patient is scheduled for SpaceOAR/Markers on 9/12/23. Patient is scheduled for Radiation SIM on 9/20/23. I will make sure patient follows back up with urology 3-6 months after completion of radiation with PSA prior to the visit.

## 2023-07-25 NOTE — TELEPHONE ENCOUNTER
Received a message from [facility]) that patient had questions about SpaceOAR and fiducial markers. Called Lenny Houston. He could not find the patient education folder I gave him at his consult which included education about SpaceOAR. We reviewed the procedure for spaceOAR/ fiducial markers and why they are recommended prior to Radiation therapy. We discussed timeline for SpaceOAR and start of RT. We reviewed his upcoming appointments. All of his questions were answered. Gave him our office number and encouraged him to call if he has additional questions.

## 2023-08-01 DIAGNOSIS — R39.198 DECREASED URINE STREAM: ICD-10-CM

## 2023-08-02 RX ORDER — TAMSULOSIN HYDROCHLORIDE 0.4 MG/1
0.8 CAPSULE ORAL 2 TIMES DAILY
Qty: 180 CAPSULE | Refills: 5 | Status: SHIPPED | OUTPATIENT
Start: 2023-08-02

## 2023-08-08 ENCOUNTER — TELEPHONE (OUTPATIENT)
Dept: UROLOGY | Facility: CLINIC | Age: 68
End: 2023-08-08

## 2023-08-08 NOTE — PROGRESS NOTES
Problem List Items Addressed This Visit        Genitourinary    Prostate cancer [facility]) - Primary    Relevant Orders    PSA Total, Diagnostic       Other    Decreased urine stream    Elevated PSA    Lower urinary tract symptoms (LUTS)             Discussion:    Yasmany Mcdonald is doing mostly well. With baseline LUTS, on finasteride and tamsulosin, he had his dose increased to 0.8 mg BID of tamsulosin with no improvement in his symptoms. I recommend he go back to once a day dosing. We can work him up for outlet surgery in the future if needed. At this time he will continue his medications. He had two pages of questions for me today regarding radiation therapy which I discussed with him at length. I reached out to the radiation team so that they can also review this with him    I reviewed the pre, sean, and post operative care for his upcoming procedure with Dr. Bib Valle as well. Proceed to fiducial marker and SpaceOAR placement as scheduled. Follow up in 6 months with PSA prior    Portions of the above record have been created with voice recognition software. Occasional wrong word or "sound alike" substitution may have occurred due to the inherent limitations of voice recognition software. Read the chart carefully and recognize, using context, where substitution may have occurred. Assessment and plan:       Please see problem oriented charting for the assessment plan of today's urological complaints      Gasper Batres MD      Chief Complaint     As listed above      History of Present Illness     Jay Lucas is a 70 y.o. man with multiple medical comorbid conditions and dequan 3+4=7 prostate cancer. He has consulted with radiation oncology with a plan for definitive radiation therapy. The consensus is that the risks of androgen deprivation outweigh any potential benefits here.     New complaints include none    The following portions of the patient's history were reviewed and updated as appropriate: allergies, current medications, past family history, past medical history, past social history, past surgical history and problem list.    Detailed Urologic History     - please refer to HPI    Review of Systems     Review of Systems   Constitutional: Negative. HENT: Negative. Eyes: Negative. Respiratory: Negative. Cardiovascular: Negative. Gastrointestinal: Negative. Endocrine: Negative. Genitourinary:        As per HPI   Musculoskeletal: Negative. Skin: Negative. Allergic/Immunologic: Negative. Neurological: Negative. Hematological: Negative. Psychiatric/Behavioral: Negative. AUA SYMPTOM SCORE    Flowsheet Row Most Recent Value   AUA SYMPTOM SCORE    How often have you had a sensation of not emptying your bladder completely after you finished urinating? 1 (P)     How often have you had to urinate again less than two hours after you finished urinating? 0 (P)     How often have you found you stopped and started again several times when you urinate? 1 (P)     How often have you found it difficult to postpone urination? 0 (P)     How often have you had a weak urinary stream? 3 (P)     How often have you had to push or strain to begin urination? 1 (P)     How many times did you most typically get up to urinate from the time you went to bed at night until the time you got up in the morning? 3 (P)     Quality of Life: If you were to spend the rest of your life with your urinary condition just the way it is now, how would you feel about that? 3 (P)     AUA SYMPTOM SCORE 9 (P)             Allergies     Allergies   Allergen Reactions   • Penicillins Hives       Physical Exam     Physical Exam  Vitals reviewed. Constitutional:       General: He is not in acute distress. Appearance: Normal appearance. He is obese. He is not ill-appearing, toxic-appearing or diaphoretic. HENT:      Head: Normocephalic and atraumatic. Eyes:      General: No scleral icterus.         Right eye: No discharge. Left eye: No discharge. Cardiovascular:      Pulses: Normal pulses. Pulmonary:      Effort: Pulmonary effort is normal.   Abdominal:      General: There is no distension. Musculoskeletal:         General: No swelling. Skin:     Coloration: Skin is not jaundiced. Neurological:      General: No focal deficit present. Mental Status: He is alert and oriented to person, place, and time. Cranial Nerves: No cranial nerve deficit. Psychiatric:         Mood and Affect: Mood normal.         Behavior: Behavior normal.         Thought Content:  Thought content normal.         Judgment: Judgment normal.             Vital Signs  Vitals:    08/09/23 0951   BP: 132/70   BP Location: Left arm   Patient Position: Sitting   Cuff Size: Large   Pulse: 83   Resp: 16   SpO2: 93%   Weight: 119 kg (261 lb 9.6 oz)   Height: 6' (1.829 m)         Current Medications       Current Outpatient Medications:   •  albuterol (PROVENTIL HFA,VENTOLIN HFA) 90 mcg/act inhaler, Inhale 2 puffs every 4 (four) hours as needed, Disp: , Rfl:   •  allopurinol (ZYLOPRIM) 100 mg tablet, TAKE ONE TABLET BY MOUTH DAILY, Disp: 90 tablet, Rfl: 5  •  atorvastatin (LIPITOR) 40 mg tablet, Take 1 tablet (40 mg total) by mouth daily, Disp: 90 tablet, Rfl: 3  •  diazepam (VALIUM) 5 mg tablet, TAKE 1 TABLET (5 MG TOTAL) BY MOUTH 2 (TWO) TIMES A DAY, Disp: 180 tablet, Rfl: 1  •  fenofibrate micronized (LOFIBRA) 134 MG capsule, Take 1 capsule (134 mg total) by mouth daily, Disp: 90 capsule, Rfl: 3  •  finasteride (PROSCAR) 5 mg tablet, Take 1 tablet (5 mg total) by mouth daily, Disp: 90 tablet, Rfl: 3  •  metoprolol succinate (TOPROL-XL) 50 mg 24 hr tablet, TAKE 1 TABLET (50 MG TOTAL) BY MOUTH DAILY, Disp: 90 tablet, Rfl: 3  •  metroNIDAZOLE (METROGEL) 0.75 % gel, Apply topically 2 (two) times a day, Disp: 45 g, Rfl: 5  •  Multiple Vitamin (DAILY VALUE MULTIVITAMIN) TABS, Take 1 tablet by mouth daily, Disp: , Rfl:   •  Omega-3 Fatty Acids (fish oil) 1,000 mg, Take 1,000 mg by mouth daily, Disp: , Rfl:   •  pregabalin (LYRICA) 150 mg capsule, TAKE 1 CAPSULE (150 MG TOTAL) BY MOUTH 2 (TWO) TIMES A DAY, Disp: 180 capsule, Rfl: 1  •  tadalafil (CIALIS) 20 MG tablet, Take 1 tablet (20 mg total) by mouth daily as needed for erectile dysfunction, Disp: 10 tablet, Rfl: 10  •  tamsulosin (FLOMAX) 0.4 mg, Take 2 capsules (0.8 mg total) by mouth 2 (two) times a day, Disp: 180 capsule, Rfl: 5  •  Trelegy Ellipta 100-62.5-25 MCG/INH inhaler, Inhale 1 puff daily, Disp: , Rfl:   •  triamcinolone (KENALOG) 0.5 % cream, Apply topically 3 (three) times a day, Disp: 30 g, Rfl: 5  •  Xarelto 20 MG tablet, TAKE ONE TABLET BY MOUTH DAILY, Disp: 90 tablet, Rfl: 3      Active Problems     Patient Active Problem List   Diagnosis   • Chronic obstructive pulmonary disease (COPD) ([facility])   • Essential hypertension   • Gout   • Hypercoagulable state ([address])   • Hyperlipidemia   • NAS (obstructive sleep apnea)   • Degeneration of intervertebral disc of lumbar region   • Type 2 diabetes mellitus with diabetic polyneuropathy, unspecified whether long term insulin use ([facility])   • ED (erectile dysfunction)   • Edema   • Mediastinal lymphadenopathy   • Neuropathy   • Ventral hernia   • Class 2 severe obesity due to excess calories with serious comorbidity and body mass index (BMI) of 35.0 to 35.9 in [place] [facility])   • Multiple lung nodules on CT   • Former smoker   • Nuclear sclerotic cataract of both eyes   • Lumbar radiculopathy   • Atopic dermatitis   • Decreased urine stream   • PVD (peripheral vascular disease) ([facility])   • Lip lesion   • Rosacea   • Elevated PSA   • Stage 3 chronic kidney disease, unspecified whether stage 3a or 3b CKD ([facility])   • Prostate cancer ([address])   • Lower urinary tract symptoms (LUTS)         Past Medical History     Past Medical History:   Diagnosis Date   • Colon polyp    • COPD (chronic obstructive pulmonary disease) ([facility])    • Elevated PSA    • Erectile dysfunction    • H/O colonoscopy 08/22/2011    DESC 8/22/2011   • Hyperlipidemia    • Hypertension    • Lower urinary tract symptoms (LUTS)    • Prostate cancer ([address])    • Pulmonary emboli ([facility])     on Xarelto   • Shortness of breath    • Sleep apnea    • Synovitis and tenosynovitis          Surgical History     Past Surgical History:   Procedure Laterality Date   • BACK SURGERY     • COLONOSCOPY     • HERNIA REPAIR     • KNEE SURGERY Right    • LUMBAR LAMINECTOMY     • NJ BX PROSTATE STRTCTC SATURATION SAMPLING IMG GID N/A 6/13/2023    Procedure: TRANSPERINEAL MRI FUSION BIOPSY PROSTATE;  Surgeon: Josh Diaz MD;  Location: [facility];  Service: Urology         Family History     Family History   Problem Relation Age of Onset   • Alzheimer's disease Mother    • Lung cancer Father    • Multiple sclerosis Father    • Leukemia Sister          Social History     Social History     Social History     Tobacco Use   Smoking Status Former   • Passive exposure: Past   Smokeless Tobacco Never   Tobacco Comments    Quit 2013         Pertinent Lab Values     Lab Results   Component Value Date    CREATININE 1.24 05/26/2023       Lab Results   Component Value Date    PSA 4.44 (H) 02/16/2023    PSA 3.69 09/22/2022    PSA 4.09 (H) 08/15/2022               Pertinent Imaging       The patient's images were reviewed by me personally and also in real time with them in the examination room using our PACS imaging system. The imaging findings are significant for prostate volume of 41 grams, category 4, no extraprostatic tumor, no seminal vesicle invasion, no osseous metastatic disease and no lymphadenopathy.

## 2023-08-09 ENCOUNTER — OFFICE VISIT (OUTPATIENT)
Dept: UROLOGY | Facility: CLINIC | Age: 68
End: 2023-08-09
Payer: COMMERCIAL

## 2023-08-09 VITALS
HEART RATE: 83 BPM | DIASTOLIC BLOOD PRESSURE: 70 MMHG | SYSTOLIC BLOOD PRESSURE: 132 MMHG | RESPIRATION RATE: 16 BRPM | BODY MASS INDEX: 35.43 KG/M2 | WEIGHT: 261.6 LBS | OXYGEN SATURATION: 93 % | HEIGHT: 72 IN

## 2023-08-09 DIAGNOSIS — R39.198 DECREASED URINE STREAM: ICD-10-CM

## 2023-08-09 DIAGNOSIS — C61 PROSTATE CANCER (HCC): Primary | ICD-10-CM

## 2023-08-09 DIAGNOSIS — R97.20 ELEVATED PSA: ICD-10-CM

## 2023-08-09 DIAGNOSIS — R39.9 LOWER URINARY TRACT SYMPTOMS (LUTS): ICD-10-CM

## 2023-08-09 PROCEDURE — 99214 OFFICE O/P EST MOD 30 MIN: CPT | Performed by: UROLOGY

## 2023-08-10 ENCOUNTER — TELEPHONE (OUTPATIENT)
Dept: RADIATION ONCOLOGY | Facility: CLINIC | Age: 68
End: 2023-08-10

## 2023-08-10 NOTE — TELEPHONE ENCOUNTER
Naomi Kaye per Dr. Cuba Neigh request because he had several questions about radiation therapy. We discussed his questions and location of treatment. All of his questions were answered except he had several questions about the blood work and prep ordered for [employer]. Gave him Urology number and recommended he speak to [facility].

## 2023-08-18 ENCOUNTER — RA CDI HCC (OUTPATIENT)
Dept: OTHER | Facility: HOSPITAL | Age: 68
End: 2023-08-18

## 2023-08-18 LAB
ALBUMIN SERPL-MCNC: 4.1 G/DL (ref 3.6–5.1)
ALBUMIN/CREAT UR: 9 MCG/MG CREAT
ALBUMIN/GLOB SERPL: 1.8 (CALC) (ref 1–2.5)
ALP SERPL-CCNC: 59 U/L (ref 35–144)
ALT SERPL-CCNC: 33 U/L (ref 9–46)
AST SERPL-CCNC: 25 U/L (ref 10–35)
BILIRUB SERPL-MCNC: 0.6 MG/DL (ref 0.2–1.2)
BUN SERPL-MCNC: 18 MG/DL (ref 7–25)
BUN/CREAT SERPL: ABNORMAL (CALC) (ref 6–22)
CALCIUM SERPL-MCNC: 9.5 MG/DL (ref 8.6–10.3)
CHLORIDE SERPL-SCNC: 104 MMOL/L (ref 98–110)
CO2 SERPL-SCNC: 31 MMOL/L (ref 20–32)
CREAT SERPL-MCNC: 1.14 MG/DL (ref 0.7–1.35)
CREAT UR-MCNC: 163 MG/DL (ref 20–320)
ERYTHROCYTE [DISTWIDTH] IN BLOOD BY AUTOMATED COUNT: 13 % (ref 11–15)
GFR/BSA.PRED SERPLBLD CYS-BASED-ARV: 70 ML/MIN/1.73M2
GLOBULIN SER CALC-MCNC: 2.3 G/DL (CALC) (ref 1.9–3.7)
GLUCOSE SERPL-MCNC: 157 MG/DL (ref 65–99)
HBA1C MFR BLD: 7.8 % OF TOTAL HGB
HCT VFR BLD AUTO: 45.7 % (ref 38.5–50)
HGB BLD-MCNC: 15.1 G/DL (ref 13.2–17.1)
MCH RBC QN AUTO: 31.1 PG (ref 27–33)
MCHC RBC AUTO-ENTMCNC: 33 G/DL (ref 32–36)
MCV RBC AUTO: 94 FL (ref 80–100)
MICROALBUMIN UR-MCNC: 1.4 MG/DL
PLATELET # BLD AUTO: 219 THOUSAND/UL (ref 140–400)
PMV BLD REES-ECKER: 10 FL (ref 7.5–12.5)
POTASSIUM SERPL-SCNC: 4.2 MMOL/L (ref 3.5–5.3)
PROT SERPL-MCNC: 6.4 G/DL (ref 6.1–8.1)
RBC # BLD AUTO: 4.86 MILLION/UL (ref 4.2–5.8)
SODIUM SERPL-SCNC: 141 MMOL/L (ref 135–146)
WBC # BLD AUTO: 7.5 THOUSAND/UL (ref 3.8–10.8)

## 2023-08-18 NOTE — PROGRESS NOTES
[facility] coding opportunities          Chart Reviewed number of suggestions sent to Provider: 2   E11.65  E11.51    Patients Insurance     Medicare Insurance: [employer] Advantage

## 2023-08-24 ENCOUNTER — OFFICE VISIT (OUTPATIENT)
Dept: FAMILY MEDICINE CLINIC | Facility: CLINIC | Age: 68
End: 2023-08-24
Payer: COMMERCIAL

## 2023-08-24 VITALS
WEIGHT: 265 LBS | HEART RATE: 60 BPM | DIASTOLIC BLOOD PRESSURE: 72 MMHG | TEMPERATURE: 97.9 F | HEIGHT: 72 IN | BODY MASS INDEX: 35.89 KG/M2 | SYSTOLIC BLOOD PRESSURE: 128 MMHG | OXYGEN SATURATION: 90 %

## 2023-08-24 DIAGNOSIS — R39.198 DECREASED URINE STREAM: ICD-10-CM

## 2023-08-24 DIAGNOSIS — E78.5 HYPERLIPIDEMIA, UNSPECIFIED HYPERLIPIDEMIA TYPE: ICD-10-CM

## 2023-08-24 DIAGNOSIS — E66.01 CLASS 2 SEVERE OBESITY DUE TO EXCESS CALORIES WITH SERIOUS COMORBIDITY AND BODY MASS INDEX (BMI) OF 35.0 TO 35.9 IN ADULT (HCC): ICD-10-CM

## 2023-08-24 DIAGNOSIS — R91.8 MULTIPLE LUNG NODULES ON CT: ICD-10-CM

## 2023-08-24 DIAGNOSIS — E11.42 TYPE 2 DIABETES MELLITUS WITH DIABETIC POLYNEUROPATHY, UNSPECIFIED WHETHER LONG TERM INSULIN USE (HCC): ICD-10-CM

## 2023-08-24 DIAGNOSIS — N18.30 STAGE 3 CHRONIC KIDNEY DISEASE, UNSPECIFIED WHETHER STAGE 3A OR 3B CKD (HCC): ICD-10-CM

## 2023-08-24 DIAGNOSIS — M51.36 DEGENERATION OF INTERVERTEBRAL DISC OF LUMBAR REGION: ICD-10-CM

## 2023-08-24 DIAGNOSIS — G47.33 OSA (OBSTRUCTIVE SLEEP APNEA): ICD-10-CM

## 2023-08-24 DIAGNOSIS — I10 ESSENTIAL HYPERTENSION: ICD-10-CM

## 2023-08-24 DIAGNOSIS — M10.9 GOUT, UNSPECIFIED CAUSE, UNSPECIFIED CHRONICITY, UNSPECIFIED SITE: ICD-10-CM

## 2023-08-24 DIAGNOSIS — G89.4 CHRONIC PAIN SYNDROME: ICD-10-CM

## 2023-08-24 DIAGNOSIS — C61 PROSTATE CANCER (HCC): ICD-10-CM

## 2023-08-24 DIAGNOSIS — J44.9 CHRONIC OBSTRUCTIVE PULMONARY DISEASE, UNSPECIFIED COPD TYPE (HCC): Primary | ICD-10-CM

## 2023-08-24 DIAGNOSIS — R97.20 ELEVATED PSA: ICD-10-CM

## 2023-08-24 DIAGNOSIS — D68.59 HYPERCOAGULABLE STATE (HCC): ICD-10-CM

## 2023-08-24 DIAGNOSIS — I73.9 PVD (PERIPHERAL VASCULAR DISEASE) (HCC): ICD-10-CM

## 2023-08-24 PROBLEM — K13.0 LIP LESION: Status: RESOLVED | Noted: 2023-02-23 | Resolved: 2023-08-24

## 2023-08-24 PROCEDURE — 99214 OFFICE O/P EST MOD 30 MIN: CPT | Performed by: FAMILY MEDICINE

## 2023-08-24 RX ORDER — TAMSULOSIN HYDROCHLORIDE 0.4 MG/1
0.8 CAPSULE ORAL
Qty: 180 CAPSULE | Refills: 5
Start: 2023-08-24

## 2023-08-24 RX ORDER — PREGABALIN 300 MG/1
300 CAPSULE ORAL 2 TIMES DAILY
Qty: 180 CAPSULE | Refills: 1 | Status: SHIPPED | OUTPATIENT
Start: 2023-08-24

## 2023-08-24 NOTE — ASSESSMENT & PLAN NOTE
He wishes to try to lose weight to get his hemoglobin A1c down.   Lab Results   Component Value Date    HGBA1C 7.8 (H) 08/17/2023

## 2023-08-24 NOTE — PROGRESS NOTES
Name: Jeri Head      : 1955      MRN: [id number]  Encounter Provider: Celestine Saab MD  Encounter Date: 2023   Encounter department: [address] [place] [address] [facility]     1. Chronic obstructive pulmonary disease, unspecified COPD type ([address] [place] [address])    2. NAS (obstructive sleep apnea)    3. Multiple lung nodules on CT    4. Essential hypertension  Assessment & Plan:  Continue Toprol-XL 50 mg daily      5. PVD (peripheral vascular disease) ([address] [place] [address])    6. Degeneration of intervertebral disc of lumbar region    7. Stage 3 chronic kidney disease, unspecified whether stage 3a or 3b CKD ([address] [place] [address])    8. Prostate cancer ([address] [place] [address])    9. Gout, unspecified cause, unspecified chronicity, unspecified site    10. Hypercoagulable state ([address] [place] [address])  Assessment & Plan:  Continue Xarelto 20 mg daily      11. Hyperlipidemia, unspecified hyperlipidemia type  Assessment & Plan:  Continue Lipitor 40 mg daily    Orders:  -     Comprehensive metabolic panel; Future; Expected date: 2024  -     CBC and differential; Future; Expected date: 2024  -     Lipid Panel with Direct LDL reflex; Future; Expected date: 2024    12. Class 2 severe obesity due to excess calories with serious comorbidity and body mass index (BMI) of 35.0 to 35.9 in adult ([address] [place] [address])    13. Elevated PSA    14. Decreased urine stream  Assessment & Plan:  Continue Flomax 0.8 mg daily and finasteride 5 mg daily. Orders:  -     tamsulosin (FLOMAX) 0.4 mg; Take 2 capsules (0.8 mg total) by mouth daily with dinner    15. Type 2 diabetes mellitus with diabetic polyneuropathy, unspecified whether long term insulin use ([address] [place] [address])  Assessment & Plan:  He wishes to try to lose weight to get his hemoglobin A1c down. Lab Results   Component Value Date    HGBA1C 7.8 (H) 2023       Orders:  -     Hemoglobin A1C; Future; Expected date: 2024  -     Albumin / creatinine urine ratio; Future; Expected date: 2024    16. Chronic pain syndrome  -     pregabalin (LYRICA) 300 MG capsule; Take 1 capsule (300 mg total) by mouth 2 (two) times a day    BMI Counseling: Body mass index is 35.94 kg/m². The BMI is above normal. Nutrition recommendations include decreasing portion sizes and moderation in carbohydrate intake. Exercise recommendations include exercising 3-5 times per week. No pharmacotherapy was ordered. Rationale for BMI follow-up plan is due to patient being overweight or obese. Depression Screening and Follow-up Plan: Patient was screened for depression during today's encounter. They screened negative with a PHQ-2 score of 0. Subjective      Patient comes in for checkup. He is to start radiation for prostate cancer next month. His emphysema has worsened and he now is on continuous oxygen. He also complains of worsening chronic low back pain. Review of Systems   Constitutional: Negative. Respiratory: Positive for shortness of breath. Cardiovascular: Negative. Musculoskeletal: Positive for back pain.        Current Outpatient Medications on File Prior to Visit   Medication Sig   • albuterol (PROVENTIL HFA,VENTOLIN HFA) 90 mcg/act inhaler Inhale 2 puffs every 4 (four) hours as needed   • allopurinol (ZYLOPRIM) 100 mg tablet TAKE ONE TABLET BY MOUTH DAILY   • atorvastatin (LIPITOR) 40 mg tablet Take 1 tablet (40 mg total) by mouth daily   • diazepam (VALIUM) 5 mg tablet TAKE 1 TABLET (5 MG TOTAL) BY MOUTH 2 (TWO) TIMES A DAY   • fenofibrate micronized (LOFIBRA) 134 MG capsule Take 1 capsule (134 mg total) by mouth daily   • finasteride (PROSCAR) 5 mg tablet Take 1 tablet (5 mg total) by mouth daily   • metoprolol succinate (TOPROL-XL) 50 mg 24 hr tablet TAKE 1 TABLET (50 MG TOTAL) BY MOUTH DAILY   • metroNIDAZOLE (METROGEL) 0.75 % gel Apply topically 2 (two) times a day   • Multiple Vitamin (DAILY VALUE MULTIVITAMIN) TABS Take 1 tablet by mouth daily   • Omega-3 Fatty Acids (fish oil) 1,000 mg Take 1,000 mg by mouth daily   • tadalafil (CIALIS) 20 MG tablet Take 1 tablet (20 mg total) by mouth daily as needed for erectile dysfunction   • Trelegy Ellipta 100-62.5-25 MCG/INH inhaler Inhale 1 puff daily   • triamcinolone (KENALOG) 0.5 % cream Apply topically 3 (three) times a day   • Xarelto 20 MG tablet TAKE ONE TABLET BY MOUTH DAILY   • [DISCONTINUED] pregabalin (LYRICA) 150 mg capsule TAKE 1 CAPSULE (150 MG TOTAL) BY MOUTH 2 (TWO) TIMES A DAY   • [DISCONTINUED] tamsulosin (FLOMAX) 0.4 mg Take 2 capsules (0.8 mg total) by mouth 2 (two) times a day       Objective     /72 (BP Location: Left arm, Patient Position: Sitting, Cuff Size: Standard)   Pulse 60   Temp 97.9 °F (36.6 °C)   Ht 6' (1.829 m)   Wt 120 kg (265 lb)   SpO2 90%   BMI 35.94 kg/m²     Physical Exam  Constitutional:       Appearance: He is well-developed. He is obese. HENT:      Head: Normocephalic and atraumatic. Eyes:      Pupils: Pupils are equal, round, and reactive to light. Cardiovascular:      Rate and Rhythm: Normal rate and regular rhythm. Pulses: no weak pulses          Dorsalis pedis pulses are 1+ on the right side and 1+ on the left side. Posterior tibial pulses are 1+ on the right side and 1+ on the left side. Heart sounds: Normal heart sounds. Pulmonary:      Effort: Pulmonary effort is normal.      Breath sounds: Normal breath sounds. Musculoskeletal:         General: Normal range of motion. Cervical back: Neck supple. Feet:      Right foot:      Skin integrity: No ulcer, skin breakdown, erythema, warmth, callus or dry skin. Left foot:      Skin integrity: No ulcer, skin breakdown, erythema, warmth, callus or dry skin. Skin:     General: Skin is warm and dry. Neurological:      Mental Status: He is alert. Psychiatric:         Mood and Affect: Mood normal.         Behavior: Behavior normal.         Thought Content:  Thought content normal.     Diabetic Foot Exam    Patient's shoes and socks removed. Right Foot/Ankle   Right Foot Inspection  Skin Exam: skin normal and skin intact. No dry skin, no warmth, no callus, no erythema, no maceration, no abnormal color, no pre-ulcer, no ulcer and no callus. Toe Exam: ROM and strength within normal limits. Sensory   Vibration: diminished  Proprioception: diminished  Monofilament testing: diminished    Vascular  The right DP pulse is 1+. The right PT pulse is 1+. Left Foot/Ankle  Left Foot Inspection  Skin Exam: skin normal and skin intact. No dry skin, no warmth, no erythema, no maceration, normal color, no pre-ulcer, no ulcer and no callus. Toe Exam: ROM and strength within normal limits. Sensory   Vibration: diminished  Proprioception: diminished  Monofilament testing: diminished    Vascular  The left DP pulse is 1+. The left PT pulse is 1+.      Assign Risk Category  No deformity present  Loss of protective sensation  No weak pulses  Risk: 1    Monica Muñiz MD

## 2023-08-25 ENCOUNTER — TELEPHONE (OUTPATIENT)
Dept: ADMINISTRATIVE | Facility: OTHER | Age: 68
End: 2023-08-25

## 2023-08-25 NOTE — LETTER
Diabetic Eye Exam Form    Date Requested: 23  Patient: Sanjay Oswald  Patient : 1955   Referring Provider: Nic Grant MD      DIABETIC Eye Exam Date _______________________________      Type of Exam MUST be documented for Diabetic Eye Exams. Please CHECK ONE. Retinal Exam       Dilated Retinal Exam       OCT       Optomap-Iris Exam      Fundus Photography       Left Eye - Please check Retinopathy or No Retinopathy        Exam did show retinopathy    Exam did not show retinopathy       Right Eye - Please check Retinopathy or No Retinopathy       Exam did show retinopathy    Exam did not show retinopathy       Comments __________________________________________________________    Practice Providing Exam ______________________________________________    Exam Performed By (print name) _______________________________________      Provider Signature ___________________________________________________      These reports are needed for  compliance. Please fax this completed form and a copy of the Diabetic Eye Exam report to our office located at [address] [place] [address] as soon as possible via Fax [phone] keegan Alanizjack Caroljovanni: Phone [phone]  We thank you for your assistance in treating our mutual patient.

## 2023-08-25 NOTE — TELEPHONE ENCOUNTER
----- Message from Reed Aguilera sent at 8/24/2023 11:21 AM EDT -----  Regarding: care gap request DM eye exam  08/24/23 11:21 AM    Hello, our patient attached above has had Diabetic Eye Exam completed/performed. Please assist in updating the patient chart by making an External outreach to [facility]  facility located in [facility]. The date of service is 6 weeks ago.     Thank you,  Reed Aguilera   [address]

## 2023-08-28 NOTE — TELEPHONE ENCOUNTER
Upon review of the In Basket request and the patient's chart, initial outreach has been made via fax to facility. Please see Contacts section for details.      Thank you  Africa Rodriguez MA

## 2023-08-28 NOTE — TELEPHONE ENCOUNTER
Upon review of the In Basket request we were able to locate, review, and update the patient chart as requested for Diabetic Eye Exam.    Any additional questions or concerns should be emailed to the Practice Liaisons via the appropriate education email address, please do not reply via In Basket.     Thank you  Sarita Bhatt MA

## 2023-08-29 ENCOUNTER — PATIENT OUTREACH (OUTPATIENT)
Dept: HEMATOLOGY ONCOLOGY | Facility: CLINIC | Age: 68
End: 2023-08-29

## 2023-08-29 NOTE — PROGRESS NOTES
Outreach made to patient. We went over general assessment questions together. He does not have any other questions or concerns at this time but knows I remain available if any arise.

## 2023-08-30 ENCOUNTER — LAB (OUTPATIENT)
Dept: LAB | Facility: HOSPITAL | Age: 68
End: 2023-08-30
Payer: COMMERCIAL

## 2023-08-30 DIAGNOSIS — C61 MALIGNANT NEOPLASM OF PROSTATE (HCC): ICD-10-CM

## 2023-08-30 LAB
ALBUMIN SERPL BCP-MCNC: 4.2 G/DL (ref 3.5–5)
ALP SERPL-CCNC: 55 U/L (ref 34–104)
ALT SERPL W P-5'-P-CCNC: 52 U/L (ref 7–52)
ANION GAP SERPL CALCULATED.3IONS-SCNC: 6 MMOL/L
AST SERPL W P-5'-P-CCNC: 46 U/L (ref 13–39)
BASOPHILS # BLD AUTO: 0.06 THOUSANDS/ÂΜL (ref 0–0.1)
BASOPHILS NFR BLD AUTO: 1 % (ref 0–1)
BILIRUB SERPL-MCNC: 0.87 MG/DL (ref 0.2–1)
BUN SERPL-MCNC: 18 MG/DL (ref 5–25)
CALCIUM SERPL-MCNC: 10.2 MG/DL (ref 8.4–10.2)
CHLORIDE SERPL-SCNC: 106 MMOL/L (ref 96–108)
CO2 SERPL-SCNC: 29 MMOL/L (ref 21–32)
CREAT SERPL-MCNC: 1.2 MG/DL (ref 0.6–1.3)
EOSINOPHIL # BLD AUTO: 0.16 THOUSAND/ÂΜL (ref 0–0.61)
EOSINOPHIL NFR BLD AUTO: 2 % (ref 0–6)
ERYTHROCYTE [DISTWIDTH] IN BLOOD BY AUTOMATED COUNT: 13.8 % (ref 11.6–15.1)
GFR SERPL CREATININE-BSD FRML MDRD: 61 ML/MIN/1.73SQ M
GLUCOSE P FAST SERPL-MCNC: 172 MG/DL (ref 65–99)
HCT VFR BLD AUTO: 46.4 % (ref 36.5–49.3)
HGB BLD-MCNC: 15.3 G/DL (ref 12–17)
IMM GRANULOCYTES # BLD AUTO: 0.04 THOUSAND/UL (ref 0–0.2)
IMM GRANULOCYTES NFR BLD AUTO: 1 % (ref 0–2)
LYMPHOCYTES # BLD AUTO: 2.89 THOUSANDS/ÂΜL (ref 0.6–4.47)
LYMPHOCYTES NFR BLD AUTO: 39 % (ref 14–44)
MCH RBC QN AUTO: 30.7 PG (ref 26.8–34.3)
MCHC RBC AUTO-ENTMCNC: 33 G/DL (ref 31.4–37.4)
MCV RBC AUTO: 93 FL (ref 82–98)
MONOCYTES # BLD AUTO: 0.77 THOUSAND/ÂΜL (ref 0.17–1.22)
MONOCYTES NFR BLD AUTO: 10 % (ref 4–12)
NEUTROPHILS # BLD AUTO: 3.55 THOUSANDS/ÂΜL (ref 1.85–7.62)
NEUTS SEG NFR BLD AUTO: 47 % (ref 43–75)
NRBC BLD AUTO-RTO: 0 /100 WBCS
PLATELET # BLD AUTO: 209 THOUSANDS/UL (ref 149–390)
PMV BLD AUTO: 9.7 FL (ref 8.9–12.7)
POTASSIUM SERPL-SCNC: 3.9 MMOL/L (ref 3.5–5.3)
PROT SERPL-MCNC: 7.2 G/DL (ref 6.4–8.4)
RBC # BLD AUTO: 4.98 MILLION/UL (ref 3.88–5.62)
SODIUM SERPL-SCNC: 141 MMOL/L (ref 135–147)
WBC # BLD AUTO: 7.47 THOUSAND/UL (ref 4.31–10.16)

## 2023-08-30 PROCEDURE — 87086 URINE CULTURE/COLONY COUNT: CPT

## 2023-08-30 PROCEDURE — 36415 COLL VENOUS BLD VENIPUNCTURE: CPT

## 2023-08-30 PROCEDURE — 80053 COMPREHEN METABOLIC PANEL: CPT

## 2023-08-30 PROCEDURE — 85025 COMPLETE CBC W/AUTO DIFF WBC: CPT

## 2023-08-31 LAB — BACTERIA UR CULT: NORMAL

## 2023-09-07 PROCEDURE — 77263 THER RADIOLOGY TX PLNG CPLX: CPT | Performed by: RADIOLOGY

## 2023-09-11 ENCOUNTER — PATIENT OUTREACH (OUTPATIENT)
Dept: HEMATOLOGY ONCOLOGY | Facility: CLINIC | Age: 68
End: 2023-09-11

## 2023-09-11 NOTE — PROGRESS NOTES
Patient called and had questions about his prep instructions for tomorrow. He is aware that he should not eat or drink anything after midnight and he is to take a fleet enema 2 hours prior to his arrival time. We also went over [facility] prep instructions. Patient is aware he is to take a fleet enema the night before his SIM and repeat the morning of. He is so start drinking 24 ounces of water about 30-45 minutes prior to his appt time. He voice understanding.

## 2023-09-11 NOTE — PROGRESS NOTES
Patient called about about when to take a shower with his antimicrobial soap. I spoke with Emmit Dakins, RN in Urology and was told that patient to is to shower tonight and tomorrow morning after he passes tool. I called patient back and he voiced understanding.

## 2023-09-12 ENCOUNTER — HOSPITAL ENCOUNTER (OUTPATIENT)
Facility: HOSPITAL | Age: 68
Setting detail: OUTPATIENT SURGERY
Discharge: HOME | End: 2023-09-12
Attending: UROLOGY | Admitting: UROLOGY
Payer: COMMERCIAL

## 2023-09-12 ENCOUNTER — ANESTHESIA EVENT (OUTPATIENT)
Dept: GASTROENTEROLOGY | Facility: HOSPITAL | Age: 68
End: 2023-09-12
Payer: COMMERCIAL

## 2023-09-12 ENCOUNTER — ANESTHESIA (OUTPATIENT)
Dept: GASTROENTEROLOGY | Facility: HOSPITAL | Age: 68
End: 2023-09-12
Payer: COMMERCIAL

## 2023-09-12 VITALS
RESPIRATION RATE: 18 BRPM | SYSTOLIC BLOOD PRESSURE: 106 MMHG | TEMPERATURE: 97.1 F | HEART RATE: 63 BPM | BODY MASS INDEX: 37.1 KG/M2 | DIASTOLIC BLOOD PRESSURE: 67 MMHG | WEIGHT: 265 LBS | OXYGEN SATURATION: 98 % | HEIGHT: 71 IN

## 2023-09-12 PROCEDURE — 55876 PLACE RT DEVICE/MARKER PROS: CPT | Performed by: UROLOGY

## 2023-09-12 PROCEDURE — NC001 PR NO CHARGE: Performed by: UROLOGY

## 2023-09-12 PROCEDURE — C1889 IMPLANT/INSERT DEVICE, NOC: HCPCS | Performed by: UROLOGY

## 2023-09-12 PROCEDURE — A4648 IMPLANTABLE TISSUE MARKER: HCPCS | Performed by: UROLOGY

## 2023-09-12 PROCEDURE — 55874 TPRNL PLMT BIODEGRDABL MATRL: CPT | Performed by: UROLOGY

## 2023-09-12 DEVICE — STERILE PLACEMENT NEEDLES (17GA ETW X 20CM) WITH BONE WAX AND (1.2 X 3MM) SOFT TISSUE GOLD MARKER [3]
Type: IMPLANTABLE DEVICE | Site: PERIANAL | Status: FUNCTIONAL
Brand: FIDUCIAL MARKER KIT

## 2023-09-12 DEVICE — SPACEOAR SYSTEMS
Type: IMPLANTABLE DEVICE | Site: PERIANAL | Status: FUNCTIONAL
Brand: SPACEOAR VUE™ SYSTEM - 10ML

## 2023-09-12 RX ORDER — PROPOFOL 10 MG/ML
INJECTION, EMULSION INTRAVENOUS AS NEEDED
Status: DISCONTINUED | OUTPATIENT
Start: 2023-09-12 | End: 2023-09-12

## 2023-09-12 RX ORDER — SODIUM CHLORIDE 9 MG/ML
INJECTION, SOLUTION INTRAVENOUS CONTINUOUS PRN
Status: DISCONTINUED | OUTPATIENT
Start: 2023-09-12 | End: 2023-09-12

## 2023-09-12 RX ORDER — LIDOCAINE HYDROCHLORIDE 20 MG/ML
INJECTION, SOLUTION EPIDURAL; INFILTRATION; INTRACAUDAL; PERINEURAL AS NEEDED
Status: DISCONTINUED | OUTPATIENT
Start: 2023-09-12 | End: 2023-09-12 | Stop reason: HOSPADM

## 2023-09-12 RX ORDER — LEVOFLOXACIN 5 MG/ML
750 INJECTION, SOLUTION INTRAVENOUS ONCE
Status: COMPLETED | OUTPATIENT
Start: 2023-09-12 | End: 2023-09-12

## 2023-09-12 RX ORDER — KETAMINE HCL IN NACL, ISO-OSM 100MG/10ML
SYRINGE (ML) INJECTION AS NEEDED
Status: DISCONTINUED | OUTPATIENT
Start: 2023-09-12 | End: 2023-09-12

## 2023-09-12 RX ADMIN — Medication 20 MG: at 13:15

## 2023-09-12 RX ADMIN — SODIUM CHLORIDE: 9 INJECTION, SOLUTION INTRAVENOUS at 13:09

## 2023-09-12 RX ADMIN — LEVOFLOXACIN 750 MG: 750 INJECTION, SOLUTION INTRAVENOUS at 11:17

## 2023-09-12 RX ADMIN — PROPOFOL 30 MG: 10 INJECTION, EMULSION INTRAVENOUS at 13:15

## 2023-09-12 RX ADMIN — PROPOFOL 50 MCG/KG/MIN: 10 INJECTION, EMULSION INTRAVENOUS at 13:16

## 2023-09-12 NOTE — DISCHARGE INSTR - AVS FIRST PAGE
Call for fever greater than 101.5°, severe pain not relieved by pain medication, or inability to urinate. Expect to see blood in the urine, blood in the stool and blood in the semen . You may see swelling and bruising of the testicles and the space between the legs. Tomorrow you may resume all usual activities. No driving or operating machinery today. Take Tylenol or ibuprofen for pain/discomfort. .  Drink plenty of fluids. You may resume your Xarelto on Thursday.

## 2023-09-12 NOTE — H&P
H&P Exam - Urology   Dolph Bolds 1955 [id number]      Assessment/Plan     Assessment:  Prostate cancer, to undergo radiation therapy with need for gold fiducial markers and SpaceOAR placement transperineal with ultrasound guidance. Plan:  Gold fiducial markers and SpaceOAR transperineal placement. The patient has severe oxygen dependent COPD. He has chronic shortness of breath which has not acutely progressed lately but he describes it as certainly not getting better. History of Present Illness   HPI:  The patient presents for the above procedure. The risks of bleeding, infection, urinary retention, inability to place the gel and need for additional procedures has been explained and informed consent given. Past Medical History:   Diagnosis Date   • Colon polyp    • COPD (chronic obstructive pulmonary disease) (HCC)    • Elevated PSA    • Erectile dysfunction    • H/O colonoscopy 08/22/2011    DESC 8/22/2011   • Hyperlipidemia    • Hypertension    • Lower urinary tract symptoms (LUTS)    • Prostate cancer ([address])    • Pulmonary emboli (HCC)     on Xarelto   • Shortness of breath    • Sleep apnea    • Synovitis and tenosynovitis        Past Surgical History:   Procedure Laterality Date   • BACK SURGERY     • COLONOSCOPY     • HERNIA REPAIR     • KNEE SURGERY Right    • LUMBAR LAMINECTOMY     • CA BX PROSTATE STRTCTC SATURATION SAMPLING IMG GID N/A 6/13/2023    Procedure: TRANSPERINEAL MRI FUSION BIOPSY PROSTATE;  Surgeon: Chan Colon MD;  [facility];  Service: Urology           Review of systems:    General: No fever. CVS: No chest pain or new SOB-on oxygen, has chronic shortness of breath. Abdomen: No diarrhea or blood in stool. : No new voiding difficulties. Neuro: No syncope/weakness/loss of sensation/paresis  Ophthalmologic: No new visual changes. Ortho: No new back/joint pains. Social History- as per previous notes.         Family History: non-contributory    Meds/Allergies PTA meds:   Prior to Admission Medications   Prescriptions Last Dose Informant Patient Reported? Taking? Multiple Vitamin (DAILY VALUE MULTIVITAMIN) TABS  Self Yes No   Sig: Take 1 tablet by mouth daily   Omega-3 Fatty Acids (fish oil) 1,000 mg  Self Yes No   Sig: Take 1,000 mg by mouth daily   Trelegy Ellipta 100-62.5-25 MCG/INH inhaler  Self Yes No   Sig: Inhale 1 puff daily   Xarelto 20 MG tablet  Self No No   Sig: TAKE ONE TABLET BY MOUTH DAILY   albuterol (PROVENTIL HFA,VENTOLIN HFA) 90 mcg/act inhaler  Self Yes No   Sig: Inhale 2 puffs every 4 (four) hours as needed   allopurinol (ZYLOPRIM) 100 mg tablet  Self No No   Sig: TAKE ONE TABLET BY MOUTH DAILY   atorvastatin (LIPITOR) 40 mg tablet  Self No No   Sig: Take 1 tablet (40 mg total) by mouth daily   diazepam (VALIUM) 5 mg tablet  Self No No   Sig: TAKE 1 TABLET (5 MG TOTAL) BY MOUTH 2 (TWO) TIMES A DAY   fenofibrate micronized (LOFIBRA) 134 MG capsule  Self No No   Sig: Take 1 capsule (134 mg total) by mouth daily   finasteride (PROSCAR) 5 mg tablet  Self No No   Sig: Take 1 tablet (5 mg total) by mouth daily   metoprolol succinate (TOPROL-XL) 50 mg 24 hr tablet  Self No No   Sig: TAKE 1 TABLET (50 MG TOTAL) BY MOUTH DAILY   metroNIDAZOLE (METROGEL) 0.75 % gel  Self No No   Sig: Apply topically 2 (two) times a day   pregabalin (LYRICA) 300 MG capsule   No No   Sig: Take 1 capsule (300 mg total) by mouth 2 (two) times a day   tadalafil (CIALIS) 20 MG tablet  Self No No   Sig: Take 1 tablet (20 mg total) by mouth daily as needed for erectile dysfunction   tamsulosin (FLOMAX) 0.4 mg   No No   Sig: Take 2 capsules (0.8 mg total) by mouth daily with dinner   triamcinolone (KENALOG) 0.5 % cream  Self No No   Sig: Apply topically 3 (three) times a day      Facility-Administered Medications: None         Objective   Vitals: Vital signs reviewed    No intake/output data recorded. Physical Exam:    Awake, alert, in NAD.     HEENT: Atraumatic, Normocephalic    Lungs: Normal Respiratory effort, no wheezes,stridor or rales. CVS- RRR    Abdomen: Nondistended. Extremiites: Normal ROM, no cyanosis/edema. Lab Results: I have personally reviewed pertinent reports. Imaging: I have personally reviewed pertinent reports.

## 2023-09-12 NOTE — OP NOTE
OPERATIVE REPORT  PATIENT NAME: Eula Chase    :  1955  MRN: [id number]  Pt Location: [facility] 01    SURGERY DATE: 2023    Surgeon(s) and Role:     Freya Braden MD - Primary    Preop Diagnosis:  Malignant neoplasm of prostate ([address]) Brendalyn Dina    Post-Op Diagnosis Codes:     * Malignant neoplasm of prostate ([address]) Brendalyn Dina    Procedure(s):  INSERTION OF FIDUCIAL MARKER . SPACEOAR    Specimen(s):  * No specimens in log *    Estimated Blood Loss:   Minimal    Drains:  * No LDAs found *    Anesthesia Type:   IV Sedation with Anesthesia    Operative Indications:  Malignant neoplasm of prostate ([address]) Brendalyn Dina  Patient undergo radiation therapy    Operative Findings:  Nothing overt    Complications:   None    Procedure and Technique:  The patient has prostate cancer and has elected to undergo radiation therapy. To shorten the duration of treatment, radiation oncology has requested fiducial marker placement and transperineal SpaceOar (R) Hydrogel placement transperineally. I have explained to the patient the procedure, and the risks of bleeding, infection and pelvic pain due to the gel. Also if I cannot develop a plane properly between the prostate and the rectum or if there is perforation of the rectum with the needle the procedure will have to be aborted. He understands and gives informed consent. The patient was brought to the operating room and identified properly. IV sedation was induced, he was prepped and draped in the dorsal lithotomy position. Care was taken to ensure that all pressure points were addressed. He was prepped with Hibiclens, and the scrotum was elevated above the perineum with IO band. A time-out was performed, and the biplanar transrectal ultrasound probe was placed into the rectum and sagittal and axial views were obtained of the prostate. First, the fiducial markers were placed.   Three markers were placed with 3 individual needles and ultrasound guidance both sagittally and axially was used to place a gold marker in the center of each lobe of the prostate and 1 at the base. Once this was done, the Space Oar kit was opened and the individual syringes were prepared and placed into the device, taking care not to mix hard inner with the Hydrogel which could cause occlusion of the needle. Once this was done, the needle itself was introduced in the midline of the perineum approximately 1 inch from the anus into the prostate under sagittal guidance-the needle was advanced above the rectal tent into the fat planes that lie between the prostate and the rectum and I advanced the needle to approximately alf along the length of the gland. Sterile saline mixed with 2 cc of 1% xylocaine plain was injected for approximately 0.5 cc to make sure I was in the proper area and not in the prostate capsule nor in the wall the rectum. This confirmed that I was indeed in the right area, and I performed hydrodissection with a couple cc of saline/lidocaine. I then attached the 2 syringes with the Luer Lock (the "Space Oar") and reconfirmed that I was in the right area, then injected both syringes simultaneously with the device containing the Hydrogel and the hardener into the space over a 15-30 second time. and watched it spread out posteriorly and anteriorly to the apex. It also spread out laterally and covered space over the anterior rectum nicely. Once I was satisfied with this, I withdrew the needle and held pressure against the perineum for a minute or 2. The patient tolerated the procedure well. I was present for the entire procedure. and A qualified resident physician was not available.     Patient Disposition:  APU        SIGNATURE: Joesph Nelson MD  DATE: September 12, 2023  TIME: 1:34 PM

## 2023-09-12 NOTE — ANESTHESIA PREPROCEDURE EVALUATION
Procedure:  INSERTION OF FIDUCIAL MARKER ,SPACEOAR (Anus)    Relevant Problems   CARDIO   (+) Essential hypertension   (+) Hyperlipidemia      ENDO   (+) Type 2 diabetes mellitus with diabetic polyneuropathy, unspecified whether long term insulin use (HCC)      /RENAL   (+) Prostate cancer (HCC)   (+) Stage 3 chronic kidney disease, unspecified whether stage 3a or 3b CKD (HCC)      HEMATOLOGY   (+) Hypercoagulable state (HCC)      MUSCULOSKELETAL   (+) Degeneration of intervertebral disc of lumbar region   (+) Gout      PULMONARY   (+) Chronic obstructive pulmonary disease (COPD) ([facility])   (+) NAS (obstructive sleep apnea)      Other   (+) Mediastinal lymphadenopathy    On home O2 2L (has increased to 3L recently)  Physical Exam    Airway    Mallampati score: II  TM Distance: >3 FB  Neck ROM: full     Dental       Cardiovascular  Cardiovascular exam normal    Pulmonary  Pulmonary exam normal     Other Findings        Anesthesia Plan  ASA Score- 4     Anesthesia Type- IV sedation with anesthesia with ASA Monitors. Additional Monitors:   Airway Plan:     Comment: Drank from water fountain at 11am, NPO appropriate at 1300. Alina Elaine Plan Factors-Exercise tolerance (METS): >4 METS. Chart reviewed. EKG reviewed. Existing labs reviewed. Patient summary reviewed. Obstructive sleep apnea risk education given perioperatively. Induction- intravenous. Postoperative Plan- Plan for postoperative opioid use. Informed Consent- Anesthetic plan and risks discussed with patient. I personally reviewed this patient with the CRNA. Discussed and agreed on the Anesthesia Plan with the CRNA. Alina Elaine

## 2023-09-12 NOTE — ANESTHESIA POSTPROCEDURE EVALUATION
Post-Op Assessment Note    CV Status:  Stable  Pain Score: 0    Pain management: adequate     Mental Status:  Alert and awake   Hydration Status:  Euvolemic   PONV Controlled:  Controlled   Airway Patency:  Patent   Two or more mitigation strategies used for obstructive sleep apnea   Post Op Vitals Reviewed: Yes      Staff: CRNA         No notable events documented.     /58 (09/12/23 1338)    Temp (!) 97.1 °F (36.2 °C) (09/12/23 1338)    Pulse 62 (09/12/23 1338)   Resp 16 (09/12/23 1338)    SpO2 100 % (09/12/23 1338)

## 2023-09-13 ENCOUNTER — TELEPHONE (OUTPATIENT)
Dept: UROLOGY | Facility: CLINIC | Age: 68
End: 2023-09-13

## 2023-09-13 NOTE — TELEPHONE ENCOUNTER
Post Op Note    Dina Alva is a 70 y.o. male s/p INSERTION OF FIDUCIAL MARKER ,SPACEOAR (Anus) performed 9/12/23 by Dr. Yanelis Weinstein. How would you rate your pain on a scale from 1 to 10, 10 being the worst pain ever? 0     Have you had a fever? No     Have your bowel movements been regular? Yes     Do you have any difficulty urinating? No    Do you have any other questions or concerns that I can address at this time? Patient reports no concerns at this time. He was reminded of upcoming appointments with Radiation Oncology.

## 2023-09-18 ENCOUNTER — PATIENT OUTREACH (OUTPATIENT)
Dept: HEMATOLOGY ONCOLOGY | Facility: CLINIC | Age: 68
End: 2023-09-18

## 2023-09-18 NOTE — PROGRESS NOTES
Patient called and had some questions about his SIM on 9/20/23. He asked why he had 3 appts in epic for 1 appt. I explained to patient that one his a nurse visit, one is the appt for Ami Mooring, and the last one is with the doctor for planning. We went over the [facility] process as well. Patient is also concerned because he stated that when he had to do his Fleet enema for his SpaceOAR/Marker procedure on 9/12/23. It took him an hour to get back off the floor and as almost late for his procedure. Patient wants to know Dr Aguirre Brought recommends anything else he can take besides doing 2 enemas for [facility]. He is expecting a call from [address]. He is aware I will be sending message to RAD ONC.

## 2023-09-18 NOTE — PROGRESS NOTES
Called [facility]. Dr. Enedina Valdovinos recommending dulcolax or miralax the night before SIM since he is unable to do enema. We reviewed that he should have BM prior to [facility].

## 2023-09-20 ENCOUNTER — CLINICAL SUPPORT (OUTPATIENT)
Dept: RADIATION ONCOLOGY | Facility: CLINIC | Age: 68
End: 2023-09-20
Attending: RADIOLOGY
Payer: COMMERCIAL

## 2023-09-20 VITALS
SYSTOLIC BLOOD PRESSURE: 148 MMHG | DIASTOLIC BLOOD PRESSURE: 82 MMHG | OXYGEN SATURATION: 92 % | HEART RATE: 55 BPM | TEMPERATURE: 97.4 F | RESPIRATION RATE: 16 BRPM

## 2023-09-20 DIAGNOSIS — C61 PROSTATE CANCER (HCC): Primary | ICD-10-CM

## 2023-09-20 PROCEDURE — 77334 RADIATION TREATMENT AID(S): CPT | Performed by: RADIOLOGY

## 2023-09-20 PROCEDURE — 99212 OFFICE O/P EST SF 10 MIN: CPT | Performed by: RADIOLOGY

## 2023-09-20 PROCEDURE — 99211 OFF/OP EST MAY X REQ PHY/QHP: CPT | Performed by: RADIOLOGY

## 2023-09-20 NOTE — PROGRESS NOTES
Yohan Heart 1955 is a 70 y.o. male with multiple comorbidities including COPD requiring O2 supplementation, PE on Xarelto and CKD who was recently diagnosed with clinical stage T1c prostate cancer, Carl 7(3+4) and pretreatment PSA of 4.44ng/mL. [facility]      23 Dr. Jurgen Godfrey  increased to 0.8 mg BID of tamsulosin with no improvement in his symptoms. I recommend he go back to once a day dosing. can work him up for outlet surgery in the future if needed. Proceed to fiducial marker and SpaceOAR placement as scheduled. Follow up in 6 months with PSA prior      23 INSERTION OF FIDUCIAL MARKER ,SPACEOAR       [facility]      Follow up visit     Oncology History   Prostate cancer ([address])   2023 Biopsy    TRANSPERINEAL MRI FUSION BIOPSY PROSTATE     A. Prostate, GUILHERME R ant base x 5:  - Prostatic adenocarcinoma, acinar type, Carl score 3 + 4 = 7, Prognostic Grade Group 2, discontinuously involving 4 of 5 cores (70%, 30%, 5%, <5%, 0%). - Percentage of Carl pattern 4: 20%  - Perineural invasion: Not identified     B. Prostate, R ant med x 2:  - Benign prostate tissue. C. Prostate, R ant lat x 2:  - Benign prostate tissue. D. Prostate, L ant lat x 2:  - Benign prostate tissue. E. Prostate, L ant med x 2:  - Benign prostate tissue. F. Prostate, L post lat x 2:  - Benign prostate tissue. G. Prostate, L post med x 2:  - Benign prostate tissue. H. Prostate, L base x 2:  - Benign prostate tissue. I. Prostate, R post lat x 2:  - Benign prostate tissue. J. Prostate, R post med x 2:  - Benign prostate tissue. K. Prostate, R base x 2:  - Benign prostate tissue.      2023 -  Cancer Staged    Staging form: Prostate, AJCC 8th Edition  - Clinical stage from 2023: Stage IIB (cT1c, cN0, cM0, PSA: 4.4, Grade Group: 2) - Signed by Shan Sterling MD on 2023  Prostate specific antigen (PSA) range: Less than 10  [place] primary pattern: 3  Carl secondary pattern: 4  [place] score: 7  Histologic grading system: 5 grade system  Location of positive needle core biopsies: One side       6/27/2023 Initial Diagnosis    Prostate cancer ([address])         Review of Systems:  Review of Systems   Constitutional: Positive for fatigue. HENT: Positive for postnasal drip. Eyes: Negative. Respiratory: Positive for cough (in the morning) and shortness of breath. O2 @ 3 L  COPD   Cardiovascular: Positive for leg swelling. Gastrointestinal: Negative. Genitourinary:        Weak urinary stream   Musculoskeletal: Positive for arthralgias, back pain and gait problem (feels off balance). Skin: Positive for rash (forehead). Allergic/Immunologic: Negative. Neurological: Positive for weakness and numbness (B/L feet). Hematological: Negative. Psychiatric/Behavioral: The patient is nervous/anxious. Clinical Trial: no    IPSS Questionnaire (AUA-7): Over the past month…    1)  How often have you had a sensation of not emptying your bladder completely after you finish urinating? 0 - Not at all   2)  How often have you had to urinate again less than two hours after you finished urinating? 0 - Not at all   3)  How often have you found you stopped and started again several times when you urinated? 0 - Not at all   4) How difficult have you found it to postpone urination? 0 - Not at all   5) How often have you had a weak urinary stream?  5 - Almost always   6) How often have you had to push or strain to begin urination? 0 - Not at all   7) How many times did you most typically get up to urinate from the time you went to bed until the time you got up in the morning?   1 - 1 time   Total Score:  6       Teaching: SIM; side effects    Health Maintenance   Topic Date Due   • COVID-19 Vaccine (5 - Pfizer series) 05/27/2022   • Pneumococcal Vaccine: 65+ Years (3 - PPSV23 if available, else PCV20) 12/12/2022   • Influenza Vaccine (1) 09/01/2023   • HEMOGLOBIN A1C  02/17/2024   • Fall Risk  02/23/2024   • Medicare Annual Wellness Visit (AWV)  02/23/2024   • Kidney Health Evaluation: Albumin/Creatinine Ratio  08/17/2024   • Depression Screening  08/24/2024   • BMI: Followup Plan  08/24/2024   • Diabetic Foot Exam  08/24/2024   • Kidney Health Evaluation: GFR  08/30/2024   • BMI: Adult  09/12/2024   • DM Eye Exam  06/20/2025   • Colorectal Cancer Screening  06/28/2029   • HIB Vaccine  Aged Out   • IPV Vaccine  Aged Out   • Hepatitis A Vaccine  Aged Out   • Meningococcal ACWY Vaccine  Aged Out   • HPV Vaccine  Aged Out   • Hepatitis C Screening  Discontinued     Patient Active Problem List   Diagnosis   • Chronic obstructive pulmonary disease (COPD) ([address])   • Essential hypertension   • Gout   • Hypercoagulable state ([address])   • Hyperlipidemia   • NAS (obstructive sleep apnea)   • Degeneration of intervertebral disc of lumbar region   • Type 2 diabetes mellitus with diabetic polyneuropathy, unspecified whether long term insulin use ([address])   • ED (erectile dysfunction)   • Edema   • Mediastinal lymphadenopathy   • Neuropathy   • Ventral hernia   • Class 2 severe obesity due to excess calories with serious comorbidity and body mass index (BMI) of 35.0 to 35.9 in adult [facility])   • Multiple lung nodules on CT   • Former smoker   • Nuclear sclerotic cataract of both eyes   • Lumbar radiculopathy   • Atopic dermatitis   • Decreased urine stream   • PVD (peripheral vascular disease) (HCC)   • Rosacea   • Stage 3 chronic kidney disease, unspecified whether stage 3a or 3b CKD ([address])   • Prostate cancer ([address])   • Lower urinary tract symptoms (LUTS)     Past Medical History:   Diagnosis Date   • Colon polyp    • COPD (chronic obstructive pulmonary disease) ([address])    • Elevated PSA    • Erectile dysfunction    • H/O colonoscopy 08/22/2011    DESC 8/22/2011   • Hyperlipidemia    • Hypertension    • Lower urinary tract symptoms (LUTS)    • Prostate cancer ([address])    • Pulmonary emboli (HCC)     on Xarelto   • Shortness of breath    • Sleep apnea    • Synovitis and tenosynovitis      Past Surgical History:   Procedure Laterality Date   • BACK SURGERY     • COLONOSCOPY     • HERNIA REPAIR     • KNEE SURGERY Right    • LUMBAR LAMINECTOMY     • VT BX PROSTATE STRTCTC SATURATION SAMPLING IMG GID N/A 6/13/2023    Procedure: TRANSPERINEAL MRI FUSION BIOPSY PROSTATE;  Surgeon: Milagros Crowell MD;  Location: [facility];  Service: Urology   • VT PLMT INTERSTITIAL DEV RADIAT [address] 1/MULT N/A 9/12/2023    Procedure: INSERTION OF FIDUCIAL Silas Meseret;  Surgeon: Ivanna Espinal MD;  Location[facility];  Service: Urology     Family History   Problem Relation Age of Onset   • Alzheimer's disease Mother    • Lung cancer Father    • Multiple sclerosis Father    • Leukemia Sister      Social History     Socioeconomic History   • Marital status:      Spouse name: Not on file   • Number of children: Not on file   • Years of education: Not on file   • Highest education level: Not on file   Occupational History   • Not on file   Tobacco Use   • Smoking status: Former     Passive exposure: Past   • Smokeless tobacco: Never   • Tobacco comments:     Quit 2013   Vaping Use   • Vaping Use: Never used   Substance and Sexual Activity   • Alcohol use: Yes     Comment: 2 shots a year, seldom   • Drug use: No   • Sexual activity: Not Currently   Other Topics Concern   • Not on file   Social History Narrative    ALWAYS USES SEAT BELTS        EXERCISES REGULARLY-STATIONARY BIKE    SEEING A DENTIST     Social Determinants of Health     Financial Resource Strain: Low Risk  (2/23/2023)    Overall Financial Resource Strain (CARDIA)    • Difficulty of Paying Living Expenses: Not hard at all   Food Insecurity: Not on file   Transportation Needs: No Transportation Needs (2/23/2023)    PRAPARE - Transportation    • Lack of Transportation (Medical):  No    • Lack of Transportation (Non-Medical):  No   Physical Activity: Not on file   Stress: Not on file   Social Connections: Not on file   Intimate Partner Violence: Not on file   Housing Stability: Not on file       Current Outpatient Medications:   •  albuterol (PROVENTIL HFA,VENTOLIN HFA) 90 mcg/act inhaler, Inhale 2 puffs every 4 (four) hours as needed, Disp: , Rfl:   •  allopurinol (ZYLOPRIM) 100 mg tablet, TAKE ONE TABLET BY MOUTH DAILY, Disp: 90 tablet, Rfl: 5  •  atorvastatin (LIPITOR) 40 mg tablet, Take 1 tablet (40 mg total) by mouth daily, Disp: 90 tablet, Rfl: 3  •  diazepam (VALIUM) 5 mg tablet, TAKE 1 TABLET (5 MG TOTAL) BY MOUTH 2 (TWO) TIMES A DAY, Disp: 180 tablet, Rfl: 1  •  fenofibrate micronized (LOFIBRA) 134 MG capsule, Take 1 capsule (134 mg total) by mouth daily, Disp: 90 capsule, Rfl: 3  •  finasteride (PROSCAR) 5 mg tablet, Take 1 tablet (5 mg total) by mouth daily, Disp: 90 tablet, Rfl: 3  •  metoprolol succinate (TOPROL-XL) 50 mg 24 hr tablet, TAKE 1 TABLET (50 MG TOTAL) BY MOUTH DAILY, Disp: 90 tablet, Rfl: 3  •  metroNIDAZOLE (METROGEL) 0.75 % gel, Apply topically 2 (two) times a day, Disp: 45 g, Rfl: 5  •  Multiple Vitamin (DAILY VALUE MULTIVITAMIN) TABS, Take 1 tablet by mouth daily, Disp: , Rfl:   •  Omega-3 Fatty Acids (fish oil) 1,000 mg, Take 1,000 mg by mouth daily, Disp: , Rfl:   •  pregabalin (LYRICA) 300 MG capsule, Take 1 capsule (300 mg total) by mouth 2 (two) times a day, Disp: 180 capsule, Rfl: 1  •  tadalafil (CIALIS) 20 MG tablet, Take 1 tablet (20 mg total) by mouth daily as needed for erectile dysfunction, Disp: 10 tablet, Rfl: 10  •  tamsulosin (FLOMAX) 0.4 mg, Take 2 capsules (0.8 mg total) by mouth daily with dinner, Disp: 180 capsule, Rfl: 5  •  Trelegy Ellipta 100-62.5-25 MCG/INH inhaler, Inhale 1 puff daily, Disp: , Rfl:   •  triamcinolone (KENALOG) 0.5 % cream, Apply topically 3 (three) times a day, Disp: 30 g, Rfl: 5  •  Xarelto 20 MG tablet, TAKE ONE TABLET BY MOUTH DAILY, Disp: 90 tablet, Rfl: 3  Allergies   Allergen Reactions   • Penicillins Hives     There were no vitals filed for this visit.

## 2023-09-20 NOTE — PROGRESS NOTES
Follow-up - Radiation Oncology   Ken Reyes 1955 70 y.o. male [id number]      History of Present Illness   Cancer Staging   Prostate cancer [facility])  Staging form: Prostate, AJCC 8th Edition  - Clinical stage from 2023: Stage IIB (cT1c, cN0, cM0, PSA: 4.4, Grade Group: 2) - Signed by Cyril Smith MD on 2023  Prostate specific antigen (PSA) range: Less than 10  Carl primary pattern: 3  Carl secondary pattern: 4  Carl score: 7  Histologic grading system: 5 grade system  Location of positive needle core biopsies: One side      Ken Reyes is a 70 y.o. man with multiple comorbidities including COPD requiring O2 supplementation, PE on Xarelto and CKD diagnosed with clinical stage T1c prostate cancer, Carl 7(3+4) and pretreatment PSA of 4.44ng/mL in 2023. He presents today for follow up and simulation.      23 Dr. Debora Valdovinos  increased to 0.8 mg BID of tamsulosin with no improvement in his symptoms. I recommend he go back to once a day dosing. can work him up for outlet surgery in the future if needed. Proceed to fiducial marker and SpaceOAR placement as scheduled. Follow up in 6 months with PSA prior     23 INSERTION OF FIDUCIAL MARKER ,SPACEOAR      Patient continues to have slow urinary stream.  He denies dysuria, hematuria, or incontinence. He denies diarrhea. He is able to empty bladder. He states that he tried bid tamsulosin, but had no benefit and so is using daily medication.     [facility]      Historical Information   Oncology History   Prostate cancer ([address])   2023 Biopsy    TRANSPERINEAL MRI FUSION BIOPSY PROSTATE     A. Prostate, GUILHERME R ant base x 5:  - Prostatic adenocarcinoma, acinar type, Carl score 3 + 4 = 7, Prognostic Grade Group 2, discontinuously involving 4 of 5 cores (70%, 30%, 5%, <5%, 0%). - Percentage of [place] pattern 4: 20%  - Perineural invasion: Not identified     B. Prostate, R ant med x 2:  - Benign prostate tissue. C. Prostate, R ant lat x 2:  - Benign prostate tissue. D. Prostate, L ant lat x 2:  - Benign prostate tissue. E. Prostate, L ant med x 2:  - Benign prostate tissue. F. Prostate, L post lat x 2:  - Benign prostate tissue. G. Prostate, L post med x 2:  - Benign prostate tissue. H. Prostate, L base x 2:  - Benign prostate tissue. I. Prostate, R post lat x 2:  - Benign prostate tissue. J. Prostate, R post med x 2:  - Benign prostate tissue. K. Prostate, R base x 2:  - Benign prostate tissue.      6/13/2023 -  Cancer Staged    Staging form: Prostate, AJCC 8th Edition  - Clinical stage from 6/13/2023: Stage IIB (cT1c, cN0, cM0, PSA: 4.4, Grade Group: 2) - Signed by Delfina Garcia MD on 7/11/2023  Prostate specific antigen (PSA) range: Less than 10  [place] primary pattern: 3  Carl secondary pattern: 4  Carl score: 7  Histologic grading system: 5 grade system  Location of positive needle core biopsies: One side       6/27/2023 Initial Diagnosis    Prostate cancer [facility])         Past Medical History:   Diagnosis Date   • Colon polyp    • COPD (chronic obstructive pulmonary disease) ([address])    • Elevated PSA    • Erectile dysfunction    • H/O colonoscopy 08/22/2011    DESC 8/22/2011   • Hyperlipidemia    • Hypertension    • Lower urinary tract symptoms (LUTS)    • Prostate cancer ([address])    • Pulmonary emboli (HCC)     on Xarelto   • Shortness of breath    • Sleep apnea    • Synovitis and tenosynovitis      Past Surgical History:   Procedure Laterality Date   • BACK SURGERY     • COLONOSCOPY     • HERNIA REPAIR     • KNEE SURGERY Right    • LUMBAR LAMINECTOMY     • PA BX PROSTATE STRTCTC SATURATION SAMPLING IMG GID N/A 6/13/2023    Procedure: TRANSPERINEAL MRI FUSION BIOPSY PROSTATE;  Surgeon: Racquel Louis MD;  Location: [facility];  Service: Urology   • PA PLMT INTERSTITIAL DEV RADIAT [address] 1/MULT N/A 9/12/2023    Procedure: INSERTION OF FIDUCIAL Elmer Richey;  Surgeon: Iver Ormond Corinne Hickory, MD;  Location: [facility];  Service: Urology       Social History   Social History     Substance and Sexual Activity   Alcohol Use Yes    Comment: 2 shots a year, seldom     Social History     Substance and Sexual Activity   Drug Use No     Social History     Tobacco Use   Smoking Status Former   • Passive exposure: Past   Smokeless Tobacco Never   Tobacco Comments    Quit 2013       Meds/Allergies     Current Outpatient Medications:   •  albuterol (PROVENTIL HFA,VENTOLIN HFA) 90 mcg/act inhaler, Inhale 2 puffs every 4 (four) hours as needed, Disp: , Rfl:   •  allopurinol (ZYLOPRIM) 100 mg tablet, TAKE ONE TABLET BY MOUTH DAILY, Disp: 90 tablet, Rfl: 5  •  atorvastatin (LIPITOR) 40 mg tablet, Take 1 tablet (40 mg total) by mouth daily, Disp: 90 tablet, Rfl: 3  •  diazepam (VALIUM) 5 mg tablet, TAKE 1 TABLET (5 MG TOTAL) BY MOUTH 2 (TWO) TIMES A DAY, Disp: 180 tablet, Rfl: 1  •  fenofibrate micronized (LOFIBRA) 134 MG capsule, Take 1 capsule (134 mg total) by mouth daily, Disp: 90 capsule, Rfl: 3  •  finasteride (PROSCAR) 5 mg tablet, Take 1 tablet (5 mg total) by mouth daily, Disp: 90 tablet, Rfl: 3  •  metoprolol succinate (TOPROL-XL) 50 mg 24 hr tablet, TAKE 1 TABLET (50 MG TOTAL) BY MOUTH DAILY, Disp: 90 tablet, Rfl: 3  •  metroNIDAZOLE (METROGEL) 0.75 % gel, Apply topically 2 (two) times a day, Disp: 45 g, Rfl: 5  •  Multiple Vitamin (DAILY VALUE MULTIVITAMIN) TABS, Take 1 tablet by mouth daily, Disp: , Rfl:   •  Omega-3 Fatty Acids (fish oil) 1,000 mg, Take 1,000 mg by mouth daily, Disp: , Rfl:   •  pregabalin (LYRICA) 300 MG capsule, Take 1 capsule (300 mg total) by mouth 2 (two) times a day, Disp: 180 capsule, Rfl: 1  •  tadalafil (CIALIS) 20 MG tablet, Take 1 tablet (20 mg total) by mouth daily as needed for erectile dysfunction, Disp: 10 tablet, Rfl: 10  •  tamsulosin (FLOMAX) 0.4 mg, Take 2 capsules (0.8 mg total) by mouth daily with dinner, Disp: 180 capsule, Rfl: 5  •  Trelegy Ellipta 100-62.5-25 MCG/INH inhaler, Inhale 1 puff daily, Disp: , Rfl:   •  triamcinolone (KENALOG) 0.5 % cream, Apply topically 3 (three) times a day, Disp: 30 g, Rfl: 5  •  Xarelto 20 MG tablet, TAKE ONE TABLET BY MOUTH DAILY, Disp: 90 tablet, Rfl: 3  Allergies   Allergen Reactions   • Penicillins Hives         Review of Systems   Constitutional: Positive for fatigue. HENT: Positive for postnasal drip. Eyes: Negative. Respiratory: Positive for cough (in the morning) and shortness of breath. O2 @ 3 L  COPD   Cardiovascular: Positive for leg swelling. Gastrointestinal: Negative. Genitourinary:        Weak urinary stream   Musculoskeletal: Positive for arthralgias, back pain and gait problem (feels off balance). Skin: Positive for rash (forehead). Allergic/Immunologic: Negative. Neurological: Positive for weakness and numbness (B/L feet). Hematological: Negative. Psychiatric/Behavioral: The patient is nervous/anxious. OBJECTIVE:   /82   Pulse 55   Temp (!) 97.4 °F (36.3 °C)   Resp 16   SpO2 92%   Karnofsky: 70 - Cares for self; unable to carry on normal activity or do normal work     Physical Exam  Vitals and nursing note reviewed. Constitutional:       General: He is not in acute distress. Comments: O2 3L NC   Neurological:      Mental Status: He is alert and oriented to person, place, and time. Gait: Gait normal.         Assessment/Plan:  Casey Cramer is a 70 y.o. man with multiple comorbidities including COPD requiring O2 supplementation, PE on Xarelto and CKD diagnosed with favorable intermediate risk prostate cancer. We again discussed definitive moderately hypofractionated prostate radiation. We reviewed risks and acute and late side effects and potential toxicities of radiation were discussed with the patient at length. Side effects discussed included, but were not limited to:  Fatigue, irritative urinary side effects, diarrhea, rectal urgency, radiation proctitis, and radiation cystitis. He wished to proceed with simulation and treatment. CT simulation today with radiation to begin soon after. Naty Stapleton MD  3/94/6882,36:71 AM    Portions of the record may have been created with voice recognition software.  Occasional wrong word or "sound a like" substitutions may have occurred due to the inherent limitations of voice recognition software.  Read the chart carefully and recognize, using context, where substitutions have occurred.

## 2023-09-28 ENCOUNTER — PATIENT OUTREACH (OUTPATIENT)
Dept: HEMATOLOGY ONCOLOGY | Facility: CLINIC | Age: 68
End: 2023-09-28

## 2023-09-28 PROCEDURE — 77301 RADIOTHERAPY DOSE PLAN IMRT: CPT | Performed by: RADIOLOGY

## 2023-09-28 PROCEDURE — 77338 DESIGN MLC DEVICE FOR IMRT: CPT | Performed by: RADIOLOGY

## 2023-09-28 PROCEDURE — 77300 RADIATION THERAPY DOSE PLAN: CPT | Performed by: RADIOLOGY

## 2023-09-28 NOTE — PROGRESS NOTES
Patient called and wanted to clarify that he is able to get the new COVID vaccine that is out.  He will be expecting a call with ans answer from the RAD ONC team.

## 2023-09-28 NOTE — PROGRESS NOTES
Outreach made to patient. I asked patient if he had questions before he starts RT. He does not have any questions at this time but knows I remain available if any arise.

## 2023-09-29 NOTE — PROGRESS NOTES
Called Andre Max. Made him aware that it is OK if he has his COVID vaccine while receiving radiation therapy.

## 2023-10-03 ENCOUNTER — APPOINTMENT (OUTPATIENT)
Dept: RADIATION ONCOLOGY | Facility: CLINIC | Age: 68
End: 2023-10-03
Attending: RADIOLOGY
Payer: COMMERCIAL

## 2023-10-03 PROCEDURE — 77385 HB NTSTY MODUL RAD TX DLVR SMPL: CPT | Performed by: RADIOLOGY

## 2023-10-03 PROCEDURE — 77427 RADIATION TX MANAGEMENT X5: CPT | Performed by: RADIOLOGY

## 2023-10-03 PROCEDURE — 77387 GUIDANCE FOR RADJ TX DLVR: CPT | Performed by: RADIOLOGY

## 2023-10-04 ENCOUNTER — APPOINTMENT (OUTPATIENT)
Dept: RADIATION ONCOLOGY | Facility: CLINIC | Age: 68
End: 2023-10-04
Attending: RADIOLOGY
Payer: COMMERCIAL

## 2023-10-04 PROCEDURE — 77385 HB NTSTY MODUL RAD TX DLVR SMPL: CPT | Performed by: RADIOLOGY

## 2023-10-04 PROCEDURE — 77387 GUIDANCE FOR RADJ TX DLVR: CPT | Performed by: RADIOLOGY

## 2023-10-05 ENCOUNTER — APPOINTMENT (OUTPATIENT)
Dept: RADIATION ONCOLOGY | Facility: CLINIC | Age: 68
End: 2023-10-05
Attending: RADIOLOGY
Payer: COMMERCIAL

## 2023-10-05 PROCEDURE — 77387 GUIDANCE FOR RADJ TX DLVR: CPT | Performed by: RADIOLOGY

## 2023-10-05 PROCEDURE — 77385 HB NTSTY MODUL RAD TX DLVR SMPL: CPT | Performed by: RADIOLOGY

## 2023-10-06 ENCOUNTER — APPOINTMENT (OUTPATIENT)
Dept: RADIATION ONCOLOGY | Facility: CLINIC | Age: 68
End: 2023-10-06
Attending: RADIOLOGY
Payer: COMMERCIAL

## 2023-10-06 PROCEDURE — 77387 GUIDANCE FOR RADJ TX DLVR: CPT | Performed by: RADIOLOGY

## 2023-10-06 PROCEDURE — 77385 HB NTSTY MODUL RAD TX DLVR SMPL: CPT | Performed by: RADIOLOGY

## 2023-10-09 ENCOUNTER — APPOINTMENT (OUTPATIENT)
Dept: RADIATION ONCOLOGY | Facility: CLINIC | Age: 68
End: 2023-10-09
Attending: RADIOLOGY
Payer: COMMERCIAL

## 2023-10-09 ENCOUNTER — PATIENT OUTREACH (OUTPATIENT)
Dept: CASE MANAGEMENT | Facility: HOSPITAL | Age: 68
End: 2023-10-09

## 2023-10-09 PROCEDURE — 77387 GUIDANCE FOR RADJ TX DLVR: CPT | Performed by: RADIOLOGY

## 2023-10-09 PROCEDURE — 77385 HB NTSTY MODUL RAD TX DLVR SMPL: CPT | Performed by: RADIOLOGY

## 2023-10-09 PROCEDURE — 77336 RADIATION PHYSICS CONSULT: CPT | Performed by: RADIOLOGY

## 2023-10-09 NOTE — PROGRESS NOTES
Biopsychosocial and Barriers Assessment    Cancer Diagnosis: prostate  Home/Cell Phone: [phone]  Emergency Contact: friend, Alex Martinez  Marital Status:   Interpretation concerns, speaks another language, preferred language: speaks [place]  Cultural concerns: none noted  Ability to read or write: independent    Caregiver/Support: friends, neighbors  Children: none  Child/Elder care: NA    Housing: lives local to [facility]:   Lives With: lives alone  Daily Living Activities: independent  [address]: home O2, portable tanks  Ambulation: independent    Preferred Pharmacy:   High co-pays with insurance: reports no concerns at this time  High co-pays with medication coverage: reports no concerns  No medication coverage: NA    Primary Care Provider: Dr. Lilibeth Sabillon  Hx of [address]: none noted  Hx of Short term rehab: none  Mental Health Hx: none  Substance Abuse Hx:   Employment: retired   Status/Location:   Ability to pay bills: reports no concerns  POA/LW/AD:   Transportation Plan/Concerns: pt drives, has been driving to tx without issue      What do you know about your Cancer Diagnosis    What has your doctor told you about your cancer diagnosis:    What has your doctor told you about your cancer treatment:    What specific concerns do you have about your diagnosis and treatment:    Have you been made aware of any hair loss associated with treatment:    Additional Comments:      MSW s/w pt after his radiation tx this afternoon to introduce myself and assess for any needs. Pt tells me that he lives alone and is independent, he still drives. He tells me that he does well at home. He does use home O2 and says that he has needed it "basically 24/7" for the past 3 months. He is not wearing it today while we spoke but says that he has a tank in the car.   Pt tells me that he has good support from friends and neighbors, he shared that he was out to breakfast with a friend yesterday, and his neighbor is accepting his O2 delivery for him today. He says that his health insurance coverage is good and he has no concerns. He is agreeable to reaching out as needed moving forward and thanked me for introducing myself. I will remain available to him as needed moving forward, no needs or concerns at this time.

## 2023-10-10 ENCOUNTER — APPOINTMENT (OUTPATIENT)
Dept: RADIATION ONCOLOGY | Facility: CLINIC | Age: 68
End: 2023-10-10
Attending: RADIOLOGY
Payer: COMMERCIAL

## 2023-10-10 PROCEDURE — 77385 HB NTSTY MODUL RAD TX DLVR SMPL: CPT | Performed by: RADIOLOGY

## 2023-10-10 PROCEDURE — 77387 GUIDANCE FOR RADJ TX DLVR: CPT | Performed by: RADIOLOGY

## 2023-10-11 ENCOUNTER — APPOINTMENT (OUTPATIENT)
Dept: RADIATION ONCOLOGY | Facility: CLINIC | Age: 68
End: 2023-10-11
Attending: RADIOLOGY
Payer: COMMERCIAL

## 2023-10-11 PROCEDURE — 77387 GUIDANCE FOR RADJ TX DLVR: CPT | Performed by: RADIOLOGY

## 2023-10-11 PROCEDURE — 77385 HB NTSTY MODUL RAD TX DLVR SMPL: CPT | Performed by: RADIOLOGY

## 2023-10-12 ENCOUNTER — APPOINTMENT (OUTPATIENT)
Dept: RADIATION ONCOLOGY | Facility: CLINIC | Age: 68
End: 2023-10-12
Attending: RADIOLOGY
Payer: COMMERCIAL

## 2023-10-12 PROCEDURE — 77387 GUIDANCE FOR RADJ TX DLVR: CPT | Performed by: RADIOLOGY

## 2023-10-12 PROCEDURE — 77385 HB NTSTY MODUL RAD TX DLVR SMPL: CPT | Performed by: RADIOLOGY

## 2023-10-13 ENCOUNTER — APPOINTMENT (OUTPATIENT)
Dept: RADIATION ONCOLOGY | Facility: CLINIC | Age: 68
End: 2023-10-13
Attending: RADIOLOGY
Payer: COMMERCIAL

## 2023-10-13 PROCEDURE — 77387 GUIDANCE FOR RADJ TX DLVR: CPT | Performed by: RADIOLOGY

## 2023-10-13 PROCEDURE — 77385 HB NTSTY MODUL RAD TX DLVR SMPL: CPT | Performed by: RADIOLOGY

## 2023-10-16 ENCOUNTER — APPOINTMENT (OUTPATIENT)
Dept: RADIATION ONCOLOGY | Facility: CLINIC | Age: 68
End: 2023-10-16
Attending: RADIOLOGY
Payer: COMMERCIAL

## 2023-10-16 PROCEDURE — 77387 GUIDANCE FOR RADJ TX DLVR: CPT | Performed by: RADIOLOGY

## 2023-10-16 PROCEDURE — 77385 HB NTSTY MODUL RAD TX DLVR SMPL: CPT | Performed by: RADIOLOGY

## 2023-10-16 PROCEDURE — 77336 RADIATION PHYSICS CONSULT: CPT | Performed by: RADIOLOGY

## 2023-10-17 ENCOUNTER — APPOINTMENT (OUTPATIENT)
Dept: RADIATION ONCOLOGY | Facility: CLINIC | Age: 68
End: 2023-10-17
Attending: RADIOLOGY
Payer: COMMERCIAL

## 2023-10-17 PROCEDURE — 77387 GUIDANCE FOR RADJ TX DLVR: CPT | Performed by: RADIOLOGY

## 2023-10-17 PROCEDURE — 77385 HB NTSTY MODUL RAD TX DLVR SMPL: CPT | Performed by: RADIOLOGY

## 2023-10-17 PROCEDURE — 77427 RADIATION TX MANAGEMENT X5: CPT | Performed by: RADIOLOGY

## 2023-10-18 ENCOUNTER — APPOINTMENT (OUTPATIENT)
Dept: RADIATION ONCOLOGY | Facility: CLINIC | Age: 68
End: 2023-10-18
Attending: RADIOLOGY
Payer: COMMERCIAL

## 2023-10-18 PROCEDURE — 77387 GUIDANCE FOR RADJ TX DLVR: CPT | Performed by: RADIOLOGY

## 2023-10-18 PROCEDURE — 77385 HB NTSTY MODUL RAD TX DLVR SMPL: CPT | Performed by: RADIOLOGY

## 2023-10-19 ENCOUNTER — APPOINTMENT (OUTPATIENT)
Dept: LAB | Facility: HOSPITAL | Age: 68
End: 2023-10-19
Attending: RADIOLOGY
Payer: COMMERCIAL

## 2023-10-19 ENCOUNTER — APPOINTMENT (OUTPATIENT)
Dept: RADIATION ONCOLOGY | Facility: CLINIC | Age: 68
End: 2023-10-19
Attending: RADIOLOGY
Payer: COMMERCIAL

## 2023-10-19 DIAGNOSIS — C61 PROSTATE CANCER (HCC): ICD-10-CM

## 2023-10-19 DIAGNOSIS — R31.0 GROSS HEMATURIA: Primary | ICD-10-CM

## 2023-10-19 LAB
BACTERIA UR QL AUTO: ABNORMAL /HPF
BILIRUB UR QL STRIP: NEGATIVE
CLARITY UR: CLEAR
COLOR UR: ABNORMAL
GLUCOSE UR STRIP-MCNC: ABNORMAL MG/DL
HGB UR QL STRIP.AUTO: ABNORMAL
KETONES UR STRIP-MCNC: NEGATIVE MG/DL
LEUKOCYTE ESTERASE UR QL STRIP: ABNORMAL
NITRITE UR QL STRIP: NEGATIVE
NON-SQ EPI CELLS URNS QL MICRO: ABNORMAL /HPF
PH UR STRIP.AUTO: 6 [PH]
PROT UR STRIP-MCNC: ABNORMAL MG/DL
RBC #/AREA URNS AUTO: ABNORMAL /HPF
SP GR UR STRIP.AUTO: 1.02 (ref 1–1.03)
UROBILINOGEN UR STRIP-ACNC: <2 MG/DL
WBC #/AREA URNS AUTO: ABNORMAL /HPF

## 2023-10-19 PROCEDURE — 81001 URINALYSIS AUTO W/SCOPE: CPT

## 2023-10-19 PROCEDURE — 77387 GUIDANCE FOR RADJ TX DLVR: CPT | Performed by: STUDENT IN AN ORGANIZED HEALTH CARE EDUCATION/TRAINING PROGRAM

## 2023-10-19 PROCEDURE — 77385 HB NTSTY MODUL RAD TX DLVR SMPL: CPT | Performed by: STUDENT IN AN ORGANIZED HEALTH CARE EDUCATION/TRAINING PROGRAM

## 2023-10-20 ENCOUNTER — APPOINTMENT (OUTPATIENT)
Dept: RADIATION ONCOLOGY | Facility: CLINIC | Age: 68
End: 2023-10-20
Attending: RADIOLOGY
Payer: COMMERCIAL

## 2023-10-20 ENCOUNTER — TELEPHONE (OUTPATIENT)
Dept: RADIATION ONCOLOGY | Facility: CLINIC | Age: 68
End: 2023-10-20

## 2023-10-20 PROCEDURE — 77385 HB NTSTY MODUL RAD TX DLVR SMPL: CPT | Performed by: RADIOLOGY

## 2023-10-20 PROCEDURE — 77387 GUIDANCE FOR RADJ TX DLVR: CPT | Performed by: RADIOLOGY

## 2023-10-20 NOTE — TELEPHONE ENCOUNTER
Stanley Peres is currently receiving radiation therapy for his prostate cancer. He reported gross hematuria x 1 day about 1 week ago. Dr. Nicole Bang ordered UA. Stanley Peres given results of UA today while in department for RT. He was advised to contact PCP for glucose in urine. Stanley Peres stated he was diagnosed with diabetes but does not take medication/ insulin for it. He denies any other episodes of gross hematuria. Made him aware that I would make Urology aware and advised him to follow up with PCP. He verbalized understanding.

## 2023-10-20 NOTE — TELEPHONE ENCOUNTER
----- Message from Pieter Torres MD sent at 10/20/2023 12:33 PM EDT -----  Glucose in urine, no infection. Urology referral for hematuria, non urgent. Referral to PCP for work-up of glucose in urine in patient without diagnosis of diabetes.

## 2023-10-23 ENCOUNTER — APPOINTMENT (OUTPATIENT)
Dept: RADIATION ONCOLOGY | Facility: CLINIC | Age: 68
End: 2023-10-23
Attending: RADIOLOGY
Payer: COMMERCIAL

## 2023-10-23 ENCOUNTER — RA CDI HCC (OUTPATIENT)
Dept: OTHER | Facility: HOSPITAL | Age: 68
End: 2023-10-23

## 2023-10-23 PROCEDURE — 77387 GUIDANCE FOR RADJ TX DLVR: CPT | Performed by: RADIOLOGY

## 2023-10-23 PROCEDURE — 77336 RADIATION PHYSICS CONSULT: CPT | Performed by: RADIOLOGY

## 2023-10-23 PROCEDURE — 77385 HB NTSTY MODUL RAD TX DLVR SMPL: CPT | Performed by: RADIOLOGY

## 2023-10-23 NOTE — PROGRESS NOTES
[address] [facility] coding opportunities        E11.65, E11.51 and E11.22  Chart Reviewed number of suggestions sent to Provider: 3     Patients Insurance     Medicare Insurance: [id number]

## 2023-10-24 ENCOUNTER — APPOINTMENT (OUTPATIENT)
Dept: RADIATION ONCOLOGY | Facility: CLINIC | Age: 68
End: 2023-10-24
Attending: RADIOLOGY
Payer: COMMERCIAL

## 2023-10-24 ENCOUNTER — TELEPHONE (OUTPATIENT)
Dept: FAMILY MEDICINE CLINIC | Facility: CLINIC | Age: 68
End: 2023-10-24

## 2023-10-24 PROCEDURE — 77427 RADIATION TX MANAGEMENT X5: CPT | Performed by: RADIOLOGY

## 2023-10-24 PROCEDURE — 77385 HB NTSTY MODUL RAD TX DLVR SMPL: CPT | Performed by: RADIOLOGY

## 2023-10-24 PROCEDURE — 77387 GUIDANCE FOR RADJ TX DLVR: CPT | Performed by: RADIOLOGY

## 2023-10-25 ENCOUNTER — APPOINTMENT (OUTPATIENT)
Dept: RADIATION ONCOLOGY | Facility: CLINIC | Age: 68
End: 2023-10-25
Attending: RADIOLOGY
Payer: COMMERCIAL

## 2023-10-25 PROCEDURE — 77385 HB NTSTY MODUL RAD TX DLVR SMPL: CPT | Performed by: RADIOLOGY

## 2023-10-25 PROCEDURE — 77387 GUIDANCE FOR RADJ TX DLVR: CPT | Performed by: RADIOLOGY

## 2023-10-26 ENCOUNTER — OFFICE VISIT (OUTPATIENT)
Dept: FAMILY MEDICINE CLINIC | Facility: CLINIC | Age: 68
End: 2023-10-26
Payer: COMMERCIAL

## 2023-10-26 ENCOUNTER — APPOINTMENT (OUTPATIENT)
Dept: RADIATION ONCOLOGY | Facility: CLINIC | Age: 68
End: 2023-10-26
Attending: RADIOLOGY
Payer: COMMERCIAL

## 2023-10-26 VITALS
DIASTOLIC BLOOD PRESSURE: 72 MMHG | WEIGHT: 262 LBS | HEART RATE: 87 BPM | OXYGEN SATURATION: 94 % | HEIGHT: 71 IN | SYSTOLIC BLOOD PRESSURE: 124 MMHG | BODY MASS INDEX: 36.68 KG/M2

## 2023-10-26 DIAGNOSIS — E11.42 TYPE 2 DIABETES MELLITUS WITH DIABETIC POLYNEUROPATHY, UNSPECIFIED WHETHER LONG TERM INSULIN USE (HCC): ICD-10-CM

## 2023-10-26 DIAGNOSIS — R81 GLYCOSURIA: Primary | ICD-10-CM

## 2023-10-26 DIAGNOSIS — I10 ESSENTIAL HYPERTENSION: ICD-10-CM

## 2023-10-26 DIAGNOSIS — R31.9 HEMATURIA, UNSPECIFIED TYPE: ICD-10-CM

## 2023-10-26 DIAGNOSIS — C61 PROSTATE CANCER (HCC): ICD-10-CM

## 2023-10-26 DIAGNOSIS — D68.59 HYPERCOAGULABLE STATE (HCC): ICD-10-CM

## 2023-10-26 PROCEDURE — 77385 HB NTSTY MODUL RAD TX DLVR SMPL: CPT | Performed by: STUDENT IN AN ORGANIZED HEALTH CARE EDUCATION/TRAINING PROGRAM

## 2023-10-26 PROCEDURE — 99214 OFFICE O/P EST MOD 30 MIN: CPT | Performed by: FAMILY MEDICINE

## 2023-10-26 PROCEDURE — 77387 GUIDANCE FOR RADJ TX DLVR: CPT | Performed by: STUDENT IN AN ORGANIZED HEALTH CARE EDUCATION/TRAINING PROGRAM

## 2023-10-26 RX ORDER — METFORMIN HYDROCHLORIDE 500 MG/1
500 TABLET, EXTENDED RELEASE ORAL
Qty: 90 TABLET | Refills: 5 | Status: SHIPPED | OUTPATIENT
Start: 2023-10-26

## 2023-10-26 NOTE — PROGRESS NOTES
Name: Saleem Springer      : 1955      MRN: [id number]  Encounter Provider: Cliff Alexandra MD  Encounter Date: 10/26/2023   Encounter department: [address] [place] [address] [facility]   Visit in February with regularly scheduled appointment  1. Glycosuria    2. Type 2 diabetes mellitus with diabetic polyneuropathy, unspecified whether long term insulin use (HCC)  Assessment & Plan:  Start metformin  Lab Results   Component Value Date    HGBA1C 7.8 (H) 2023       Orders:  -     metFORMIN (GLUCOPHAGE-XR) 500 mg 24 hr tablet; Take 1 tablet (500 mg total) by mouth daily with dinner    3. Prostate cancer [facility])  Assessment & Plan:  Continue radiation treatments    Orders:  -     Ambulatory Referral to Urology; Future    4. Hematuria, unspecified type  -     Ambulatory Referral to Urology; Future    5. Hypercoagulable state ([address])  Assessment & Plan:  Continue Xarelto 20 mg daily      6. Essential hypertension  Assessment & Plan:  Continue Toprol-XL 50 mg daily          Depression Screening and Follow-up Plan: Patient was screened for depression during today's encounter. They screened negative with a PHQ-2 score of 0. Subjective      Patient is referred back here from oncology. He is having radiation treatment for prostate cancer. He was found to have sugar in his urine. He is known diabetic and until now has been diet controlled. Hemoglobin A1c today is 8.3. He also had 1 episode of gross hematuria. His urinalysis showed microscopic hematuria. He is asymptomatic. Review of Systems   Constitutional: Negative. Respiratory: Negative. Cardiovascular: Negative. Genitourinary:  Positive for hematuria.        Current Outpatient Medications on File Prior to Visit   Medication Sig    albuterol (PROVENTIL HFA,VENTOLIN HFA) 90 mcg/act inhaler Inhale 2 puffs every 4 (four) hours as needed    allopurinol (ZYLOPRIM) 100 mg tablet TAKE ONE TABLET BY MOUTH DAILY    atorvastatin (LIPITOR) 40 mg tablet Take 1 tablet (40 mg total) by mouth daily    fenofibrate micronized (LOFIBRA) 134 MG capsule Take 1 capsule (134 mg total) by mouth daily    finasteride (PROSCAR) 5 mg tablet Take 1 tablet (5 mg total) by mouth daily    metoprolol succinate (TOPROL-XL) 50 mg 24 hr tablet TAKE 1 TABLET (50 MG TOTAL) BY MOUTH DAILY    metroNIDAZOLE (METROGEL) 0.75 % gel Apply topically 2 (two) times a day    Multiple Vitamin (DAILY VALUE MULTIVITAMIN) TABS Take 1 tablet by mouth daily    Omega-3 Fatty Acids (fish oil) 1,000 mg Take 1,000 mg by mouth daily    pregabalin (LYRICA) 300 MG capsule Take 1 capsule (300 mg total) by mouth 2 (two) times a day    tadalafil (CIALIS) 20 MG tablet Take 1 tablet (20 mg total) by mouth daily as needed for erectile dysfunction    tamsulosin (FLOMAX) 0.4 mg Take 2 capsules (0.8 mg total) by mouth daily with dinner    Trelegy Ellipta 100-62.5-25 MCG/INH inhaler Inhale 1 puff daily    triamcinolone (KENALOG) 0.5 % cream Apply topically 3 (three) times a day    Xarelto 20 MG tablet TAKE ONE TABLET BY MOUTH DAILY    diazepam (VALIUM) 5 mg tablet TAKE 1 TABLET (5 MG TOTAL) BY MOUTH 2 (TWO) TIMES A DAY       Objective     /72 (BP Location: Left arm, Patient Position: Sitting, Cuff Size: Standard)   Pulse 87   Ht 5' 11" (1.803 m)   Wt 119 kg (262 lb)   SpO2 94%   BMI 36.54 kg/m²     Physical Exam  Constitutional:       Appearance: He is well-developed. He is obese. HENT:      Head: Normocephalic and atraumatic. Eyes:      Pupils: Pupils are equal, round, and reactive to light. Cardiovascular:      Rate and Rhythm: Normal rate and regular rhythm. Heart sounds: Normal heart sounds. Pulmonary:      Effort: Pulmonary effort is normal.      Breath sounds: Normal breath sounds. Musculoskeletal:         General: Normal range of motion. Cervical back: Neck supple. Skin:     General: Skin is warm and dry.    Neurological:      Mental Status: He is alert.    Psychiatric:         Mood and Affect: Mood normal.         Behavior: Behavior normal.       Lori Brandt MD

## 2023-10-27 ENCOUNTER — APPOINTMENT (OUTPATIENT)
Dept: RADIATION ONCOLOGY | Facility: CLINIC | Age: 68
End: 2023-10-27
Attending: RADIOLOGY
Payer: COMMERCIAL

## 2023-10-27 PROCEDURE — 77387 GUIDANCE FOR RADJ TX DLVR: CPT | Performed by: RADIOLOGY

## 2023-10-27 PROCEDURE — 77385 HB NTSTY MODUL RAD TX DLVR SMPL: CPT | Performed by: RADIOLOGY

## 2023-10-30 ENCOUNTER — APPOINTMENT (OUTPATIENT)
Dept: RADIATION ONCOLOGY | Facility: CLINIC | Age: 68
End: 2023-10-30
Attending: RADIOLOGY
Payer: COMMERCIAL

## 2023-10-30 DIAGNOSIS — M51.36 DEGENERATION OF INTERVERTEBRAL DISC OF LUMBAR REGION: ICD-10-CM

## 2023-10-30 PROCEDURE — 77336 RADIATION PHYSICS CONSULT: CPT | Performed by: RADIOLOGY

## 2023-10-30 PROCEDURE — 77387 GUIDANCE FOR RADJ TX DLVR: CPT | Performed by: RADIOLOGY

## 2023-10-30 PROCEDURE — 77385 HB NTSTY MODUL RAD TX DLVR SMPL: CPT | Performed by: RADIOLOGY

## 2023-10-30 RX ORDER — DIAZEPAM 5 MG/1
5 TABLET ORAL 2 TIMES DAILY
Qty: 180 TABLET | Refills: 1 | Status: SHIPPED | OUTPATIENT
Start: 2023-10-30 | End: 2024-04-27

## 2023-10-31 ENCOUNTER — APPOINTMENT (OUTPATIENT)
Dept: RADIATION ONCOLOGY | Facility: CLINIC | Age: 68
End: 2023-10-31
Attending: RADIOLOGY
Payer: COMMERCIAL

## 2023-10-31 PROCEDURE — 77427 RADIATION TX MANAGEMENT X5: CPT | Performed by: RADIOLOGY

## 2023-10-31 PROCEDURE — 77385 HB NTSTY MODUL RAD TX DLVR SMPL: CPT | Performed by: STUDENT IN AN ORGANIZED HEALTH CARE EDUCATION/TRAINING PROGRAM

## 2023-10-31 PROCEDURE — 77387 GUIDANCE FOR RADJ TX DLVR: CPT | Performed by: STUDENT IN AN ORGANIZED HEALTH CARE EDUCATION/TRAINING PROGRAM

## 2023-11-01 ENCOUNTER — APPOINTMENT (OUTPATIENT)
Dept: RADIATION ONCOLOGY | Facility: CLINIC | Age: 68
End: 2023-11-01
Attending: RADIOLOGY
Payer: COMMERCIAL

## 2023-11-01 PROCEDURE — 77385 HB NTSTY MODUL RAD TX DLVR SMPL: CPT | Performed by: RADIOLOGY

## 2023-11-01 PROCEDURE — 77387 GUIDANCE FOR RADJ TX DLVR: CPT | Performed by: RADIOLOGY

## 2023-11-02 ENCOUNTER — APPOINTMENT (OUTPATIENT)
Dept: RADIATION ONCOLOGY | Facility: CLINIC | Age: 68
End: 2023-11-02
Attending: RADIOLOGY
Payer: COMMERCIAL

## 2023-11-02 DIAGNOSIS — E78.5 HYPERLIPIDEMIA, UNSPECIFIED HYPERLIPIDEMIA TYPE: ICD-10-CM

## 2023-11-02 PROCEDURE — 77385 HB NTSTY MODUL RAD TX DLVR SMPL: CPT | Performed by: STUDENT IN AN ORGANIZED HEALTH CARE EDUCATION/TRAINING PROGRAM

## 2023-11-02 PROCEDURE — 77387 GUIDANCE FOR RADJ TX DLVR: CPT | Performed by: STUDENT IN AN ORGANIZED HEALTH CARE EDUCATION/TRAINING PROGRAM

## 2023-11-02 RX ORDER — ATORVASTATIN CALCIUM 40 MG/1
40 TABLET, FILM COATED ORAL DAILY
Qty: 90 TABLET | Refills: 3 | Status: SHIPPED | OUTPATIENT
Start: 2023-11-02

## 2023-11-03 ENCOUNTER — APPOINTMENT (OUTPATIENT)
Dept: RADIATION ONCOLOGY | Facility: CLINIC | Age: 68
End: 2023-11-03
Attending: RADIOLOGY
Payer: COMMERCIAL

## 2023-11-03 PROCEDURE — 77387 GUIDANCE FOR RADJ TX DLVR: CPT | Performed by: RADIOLOGY

## 2023-11-03 PROCEDURE — 77385 HB NTSTY MODUL RAD TX DLVR SMPL: CPT | Performed by: RADIOLOGY

## 2023-11-06 ENCOUNTER — APPOINTMENT (OUTPATIENT)
Dept: RADIATION ONCOLOGY | Facility: CLINIC | Age: 68
End: 2023-11-06
Attending: RADIOLOGY
Payer: COMMERCIAL

## 2023-11-06 PROCEDURE — 77387 GUIDANCE FOR RADJ TX DLVR: CPT | Performed by: RADIOLOGY

## 2023-11-06 PROCEDURE — 77336 RADIATION PHYSICS CONSULT: CPT | Performed by: RADIOLOGY

## 2023-11-06 PROCEDURE — 77385 HB NTSTY MODUL RAD TX DLVR SMPL: CPT | Performed by: RADIOLOGY

## 2023-11-07 ENCOUNTER — APPOINTMENT (OUTPATIENT)
Dept: RADIATION ONCOLOGY | Facility: CLINIC | Age: 68
End: 2023-11-07
Attending: RADIOLOGY
Payer: COMMERCIAL

## 2023-11-07 PROCEDURE — 77385 HB NTSTY MODUL RAD TX DLVR SMPL: CPT | Performed by: RADIOLOGY

## 2023-11-07 PROCEDURE — 77427 RADIATION TX MANAGEMENT X5: CPT | Performed by: RADIOLOGY

## 2023-11-07 PROCEDURE — 77387 GUIDANCE FOR RADJ TX DLVR: CPT | Performed by: RADIOLOGY

## 2023-11-08 ENCOUNTER — APPOINTMENT (OUTPATIENT)
Dept: RADIATION ONCOLOGY | Facility: CLINIC | Age: 68
End: 2023-11-08
Attending: RADIOLOGY
Payer: COMMERCIAL

## 2023-11-08 PROCEDURE — 77385 HB NTSTY MODUL RAD TX DLVR SMPL: CPT | Performed by: RADIOLOGY

## 2023-11-08 PROCEDURE — 77387 GUIDANCE FOR RADJ TX DLVR: CPT | Performed by: RADIOLOGY

## 2023-11-09 ENCOUNTER — APPOINTMENT (OUTPATIENT)
Dept: RADIATION ONCOLOGY | Facility: CLINIC | Age: 68
End: 2023-11-09
Attending: RADIOLOGY
Payer: COMMERCIAL

## 2023-11-09 PROCEDURE — 77385 HB NTSTY MODUL RAD TX DLVR SMPL: CPT | Performed by: STUDENT IN AN ORGANIZED HEALTH CARE EDUCATION/TRAINING PROGRAM

## 2023-11-09 PROCEDURE — 77336 RADIATION PHYSICS CONSULT: CPT | Performed by: RADIOLOGY

## 2023-11-09 PROCEDURE — 77387 GUIDANCE FOR RADJ TX DLVR: CPT | Performed by: STUDENT IN AN ORGANIZED HEALTH CARE EDUCATION/TRAINING PROGRAM

## 2023-11-14 DIAGNOSIS — E78.2 MIXED HYPERLIPIDEMIA: ICD-10-CM

## 2023-11-14 RX ORDER — FENOFIBRATE 134 MG/1
134 CAPSULE ORAL DAILY
Qty: 90 CAPSULE | Refills: 3 | Status: SHIPPED | OUTPATIENT
Start: 2023-11-14

## 2023-12-07 NOTE — TELEPHONE ENCOUNTER
Called pt. DAIN in regards of advisement.
[facility] ([place]) contacted our office while pt at the lab to have labs drawn (ordered in 8/23) - with Expected Date: 2/20/2024 - U/a resulted on 10/19/2023. Lab is wondering if it's okay to drawn those labs now prior 2/24. Pt due for wellness annual visit  in 2/24 - awv scheduled for 02/29/2024. Pt agreed to wait for 's advisements / to return to lab sometimes this week.      Please contact pt    Please advise
No need for blood work. We should do a fingerstick hemoglobin A1c at his appointment.     Left voice mail
Spoke to patient.  Patient  has been having a rash throughout her body.  States has been going on since September.  Patient  has a rash around the nipple area of the right breast.  States when she wears a bra does get irritated and the bra is moist/wet after taking it off.  No discharge from the nipple. Denies any blood.  States the area does itch.    No fever.  States is more on the right but does get it on the left.  Has used lotrisone cream, but states has not helped.    Recommended evaluation.  Appointment made with Soha Suazo to evaluate due to patient time preference.  If any worsening should be seen sooner.   Reason for Disposition   Patient wants to be seen    Protocols used: Breast Symptoms-A-OH    
T/c from pt - pt has an appt on 10/26 with you and is wondering if labs are needed for this appt - [facility] will not draw on the labs dated 8/24/23 because it's too soon from last lab draw. Please advise.
Whit is asking for a call to discuss a breast issue.  
Dr. Aceves

## 2023-12-13 ENCOUNTER — TELEMEDICINE (OUTPATIENT)
Dept: RADIATION ONCOLOGY | Facility: CLINIC | Age: 68
End: 2023-12-13
Attending: RADIOLOGY

## 2023-12-13 DIAGNOSIS — C61 PROSTATE CANCER (HCC): Primary | ICD-10-CM

## 2023-12-13 PROCEDURE — 99024 POSTOP FOLLOW-UP VISIT: CPT | Performed by: RADIOLOGY

## 2023-12-13 NOTE — PROGRESS NOTES
Virtual Brief Visit    This Visit is being completed by telephone. The Patient is located at Home and in the following state in which I hold an active license [place]    The patient was identified by name and date of birth. Jewell Benitez was informed that this is a telemedicine visit and that the visit is being conducted through Telephone. My office door was closed. No one else was in the room. He acknowledged consent and understanding of privacy and security of the video platform. The patient has agreed to participate and understands they can discontinue the visit at any time. Patient is aware this is a billable service. Subjective  Cristy Chan is 69 yo man with multiple comorbidities including COPD requiring O2, PE on Xarelto and CKD diagnosed with cT1cN0 prostate cancer, GS 7 (3+4) and pretreatment PSA of 4.44ng/mL. He recently completed a course of definitive radiation as described above for favorable intermediate risk prostate cancer completed on 11/9/2023. The patient notes improvement of his urinary symptoms. He denies dysuria, hematuria, or incontinence. He denies diarrhea. Nocturia at baseline at 1 time a night. He complains of continued fatigue. He notes difficulty falling asleep and staying asleep. This is not new. He has follow-up with Urology in February 2024. PSA is ordered. He states PCP ordered PSA as well and questions need for another order. Assessment/Plan:  Cristy Chan is 69 yo man with multiple comorbidities and favorable intermediate risk prostate cancer completed on 11/9/2023. He is recovering from radiation. We discussed that fatigue from radiation will likely improve with time. We discussed that difficulty with sleeping that he describes are likely related to radiation and recommended evaluation with PCP. He will be due for PSA and Urology follow-up in February 2024. We will see him in 6 months. We will see him sooner should the need arise.         Problem List Items Addressed This Visit    None      Recent Visits  No visits were found meeting these conditions. Showing recent visits within past 7 days and meeting all other requirements  Future Appointments  No visits were found meeting these conditions.   Showing future appointments within next 150 days and meeting all other requirements         Visit Time  Total Visit Duration: 15 minutes

## 2023-12-14 LAB
LEFT EYE DIABETIC RETINOPATHY: NORMAL
RIGHT EYE DIABETIC RETINOPATHY: NORMAL

## 2024-02-20 DIAGNOSIS — R39.198 DECREASED URINE STREAM: ICD-10-CM

## 2024-02-20 RX ORDER — TAMSULOSIN HYDROCHLORIDE 0.4 MG/1
CAPSULE ORAL
Qty: 180 CAPSULE | Refills: 5 | Status: SHIPPED | OUTPATIENT
Start: 2024-02-20

## 2024-02-22 ENCOUNTER — RA CDI HCC (OUTPATIENT)
Dept: OTHER | Facility: HOSPITAL | Age: 69
End: 2024-02-22

## 2024-02-22 NOTE — PROGRESS NOTES
E11.51, E11.22, E11.65  HCC coding opportunities          Chart Reviewed number of suggestions sent to Provider: 3     Patients Insurance     Medicare Insurance: [employer]

## 2024-02-23 LAB
ALBUMIN SERPL-MCNC: 4.3 G/DL (ref 3.6–5.1)
ALBUMIN/CREAT UR: 29 MCG/MG CREAT
ALBUMIN/GLOB SERPL: 1.8 (CALC) (ref 1–2.5)
ALP SERPL-CCNC: 60 U/L (ref 35–144)
ALT SERPL-CCNC: 39 U/L (ref 9–46)
AST SERPL-CCNC: 32 U/L (ref 10–35)
BASOPHILS # BLD AUTO: 48 CELLS/UL (ref 0–200)
BASOPHILS NFR BLD AUTO: 0.4 %
BILIRUB SERPL-MCNC: 1 MG/DL (ref 0.2–1.2)
BUN SERPL-MCNC: 23 MG/DL (ref 7–25)
BUN/CREAT SERPL: ABNORMAL (CALC) (ref 6–22)
CALCIUM SERPL-MCNC: 10.2 MG/DL (ref 8.6–10.3)
CHLORIDE SERPL-SCNC: 102 MMOL/L (ref 98–110)
CHOLEST SERPL-MCNC: 131 MG/DL
CHOLEST/HDLC SERPL: 3.4 (CALC)
CO2 SERPL-SCNC: 31 MMOL/L (ref 20–32)
CREAT SERPL-MCNC: 1.1 MG/DL (ref 0.7–1.35)
CREAT UR-MCNC: 195 MG/DL (ref 20–320)
EOSINOPHIL # BLD AUTO: 95 CELLS/UL (ref 15–500)
EOSINOPHIL NFR BLD AUTO: 0.8 %
ERYTHROCYTE [DISTWIDTH] IN BLOOD BY AUTOMATED COUNT: 12.1 % (ref 11–15)
GFR/BSA.PRED SERPLBLD CYS-BASED-ARV: 73 ML/MIN/1.73M2
GLOBULIN SER CALC-MCNC: 2.4 G/DL (CALC) (ref 1.9–3.7)
GLUCOSE SERPL-MCNC: 245 MG/DL (ref 65–99)
HBA1C MFR BLD: 8.9 % OF TOTAL HGB
HCT VFR BLD AUTO: 46.9 % (ref 38.5–50)
HDLC SERPL-MCNC: 39 MG/DL
HGB BLD-MCNC: 15.3 G/DL (ref 13.2–17.1)
LDLC SERPL CALC-MCNC: 69 MG/DL (CALC)
LYMPHOCYTES # BLD AUTO: 845 CELLS/UL (ref 850–3900)
LYMPHOCYTES NFR BLD AUTO: 7.1 %
MCH RBC QN AUTO: 31.1 PG (ref 27–33)
MCHC RBC AUTO-ENTMCNC: 32.6 G/DL (ref 32–36)
MCV RBC AUTO: 95.3 FL (ref 80–100)
MICROALBUMIN UR-MCNC: 5.6 MG/DL
MONOCYTES # BLD AUTO: 762 CELLS/UL (ref 200–950)
MONOCYTES NFR BLD AUTO: 6.4 %
NEUTROPHILS # BLD AUTO: ABNORMAL CELLS/UL (ref 1500–7800)
NEUTROPHILS NFR BLD AUTO: 85.3 %
NONHDLC SERPL-MCNC: 92 MG/DL (CALC)
PLATELET # BLD AUTO: 185 THOUSAND/UL (ref 140–400)
PMV BLD REES-ECKER: 10 FL (ref 7.5–12.5)
POTASSIUM SERPL-SCNC: 4.4 MMOL/L (ref 3.5–5.3)
PROT SERPL-MCNC: 6.7 G/DL (ref 6.1–8.1)
RBC # BLD AUTO: 4.92 MILLION/UL (ref 4.2–5.8)
SODIUM SERPL-SCNC: 142 MMOL/L (ref 135–146)
TRIGL SERPL-MCNC: 142 MG/DL
WBC # BLD AUTO: 11.9 THOUSAND/UL (ref 3.8–10.8)

## 2024-02-29 ENCOUNTER — OFFICE VISIT (OUTPATIENT)
Dept: FAMILY MEDICINE CLINIC | Facility: CLINIC | Age: 69
End: 2024-02-29
Payer: COMMERCIAL

## 2024-02-29 VITALS
DIASTOLIC BLOOD PRESSURE: 64 MMHG | HEART RATE: 63 BPM | WEIGHT: 264 LBS | OXYGEN SATURATION: 95 % | TEMPERATURE: 97.6 F | HEIGHT: 71 IN | BODY MASS INDEX: 36.96 KG/M2 | SYSTOLIC BLOOD PRESSURE: 112 MMHG

## 2024-02-29 DIAGNOSIS — E11.42 TYPE 2 DIABETES MELLITUS WITH DIABETIC POLYNEUROPATHY, UNSPECIFIED WHETHER LONG TERM INSULIN USE (HCC): ICD-10-CM

## 2024-02-29 DIAGNOSIS — Z00.00 MEDICARE ANNUAL WELLNESS VISIT, SUBSEQUENT: Primary | ICD-10-CM

## 2024-02-29 DIAGNOSIS — D68.59 HYPERCOAGULABLE STATE (HCC): ICD-10-CM

## 2024-02-29 DIAGNOSIS — E78.5 HYPERLIPIDEMIA, UNSPECIFIED HYPERLIPIDEMIA TYPE: ICD-10-CM

## 2024-02-29 DIAGNOSIS — R91.8 MULTIPLE LUNG NODULES ON CT: ICD-10-CM

## 2024-02-29 DIAGNOSIS — E66.01 CLASS 2 SEVERE OBESITY DUE TO EXCESS CALORIES WITH SERIOUS COMORBIDITY AND BODY MASS INDEX (BMI) OF 36.0 TO 36.9 IN ADULT: ICD-10-CM

## 2024-02-29 DIAGNOSIS — I10 ESSENTIAL HYPERTENSION: ICD-10-CM

## 2024-02-29 DIAGNOSIS — R06.09 DOE (DYSPNEA ON EXERTION): ICD-10-CM

## 2024-02-29 DIAGNOSIS — C61 PROSTATE CANCER (HCC): ICD-10-CM

## 2024-02-29 DIAGNOSIS — J44.9 CHRONIC OBSTRUCTIVE PULMONARY DISEASE, UNSPECIFIED COPD TYPE (HCC): ICD-10-CM

## 2024-02-29 DIAGNOSIS — R39.198 DECREASED URINE STREAM: ICD-10-CM

## 2024-02-29 DIAGNOSIS — M10.9 GOUT, UNSPECIFIED CAUSE, UNSPECIFIED CHRONICITY, UNSPECIFIED SITE: ICD-10-CM

## 2024-02-29 DIAGNOSIS — G47.33 OSA (OBSTRUCTIVE SLEEP APNEA): ICD-10-CM

## 2024-02-29 DIAGNOSIS — N18.30 STAGE 3 CHRONIC KIDNEY DISEASE, UNSPECIFIED WHETHER STAGE 3A OR 3B CKD (HCC): ICD-10-CM

## 2024-02-29 DIAGNOSIS — I73.9 PVD (PERIPHERAL VASCULAR DISEASE) (HCC): ICD-10-CM

## 2024-02-29 PROBLEM — H25.13 NUCLEAR SCLEROTIC CATARACT OF BOTH EYES: Status: RESOLVED | Noted: 2019-10-21 | Resolved: 2024-02-29

## 2024-02-29 PROCEDURE — G0439 PPPS, SUBSEQ VISIT: HCPCS | Performed by: FAMILY MEDICINE

## 2024-02-29 PROCEDURE — 99214 OFFICE O/P EST MOD 30 MIN: CPT | Performed by: FAMILY MEDICINE

## 2024-02-29 RX ORDER — GLIPIZIDE 5 MG/1
5 TABLET ORAL
Qty: 90 TABLET | Refills: 5 | Status: SHIPPED | OUTPATIENT
Start: 2024-02-29

## 2024-02-29 RX ORDER — LEVALBUTEROL TARTRATE 45 UG/1
2 AEROSOL, METERED ORAL EVERY 6 HOURS PRN
COMMUNITY
Start: 2024-02-23

## 2024-02-29 NOTE — PROGRESS NOTES
Assessment and Plan:   Return visit in 4 months with fasting blood prior to visit.  Problem List Items Addressed This Visit       Chronic obstructive pulmonary disease (COPD) ([facility])    Relevant Medications    levalbuterol (XOPENEX HFA) 45 mcg/act inhaler    Essential hypertension     Continue Toprol- mg daily         Gout     Continue allopurinol 100 mg daily         Hypercoagulable state (HCC)     Continue Xarelto 20 mg daily         Hyperlipidemia     Continue Lipitor 40 mg daily and fenofibrate 134 mg daily         Relevant Orders    Comprehensive metabolic panel    CBC and differential    Lipid Panel with Direct LDL reflex    NAS (obstructive sleep apnea)    Type 2 diabetes mellitus with diabetic polyneuropathy, unspecified whether long term insulin use ([facility])     Discontinue metformin.  Start glipizide 5 mg every morning.  Lab Results   Component Value Date    HGBA1C 8.9 (H) 02/22/2024            Relevant Medications    glipiZIDE (GLUCOTROL) 5 mg tablet    Other Relevant Orders    Hemoglobin A1C    C-peptide    Class 2 severe obesity due to excess calories with serious comorbidity and body mass index (BMI) of 36.0 to 36.9 in adult     Multiple lung nodules on CT    Relevant Medications    levalbuterol (XOPENEX HFA) 45 mcg/act inhaler    Decreased urine stream    PVD (peripheral vascular disease) ([facility])    Stage 3 chronic kidney disease, unspecified whether stage 3a or 3b CKD (HCC)    Prostate cancer ([facility])    LUCAS (dyspnea on exertion)     Other Visit Diagnoses       Medicare annual wellness visit, subsequent    -  Primary            Depression Screening and Follow-up Plan: Patient was screened for depression during today's encounter. They screened negative with a PHQ-2 score of 2.      Preventive health issues were discussed with patient, and age appropriate screening tests were ordered as noted in patient's After Visit Summary.  Personalized health advice and appropriate referrals for health education or  preventive services given if needed, as noted in patient's After Visit Summary.     History of Present Illness:     Patient presents for a Medicare Wellness Visit    Patient comes in for checkup.  He complains of worsening dyspnea on exertion.  He is status increase his oxygen from 2 to 4 L when he is exerting himself.       Patient Care Team:  Bari Esqueda MD as PCP - General  MD Stephany Webster MA as Care Coordinator (Oncology)  GENEVIEVE Francisco as  Care Manager (Oncology)     Review of Systems:     Review of Systems   Constitutional: Negative.    HENT: Negative.     Respiratory:  Positive for shortness of breath.    Cardiovascular:  Positive for chest pain and leg swelling. Negative for palpitations.   Gastrointestinal: Negative.         Problem List:     Patient Active Problem List   Diagnosis    Chronic obstructive pulmonary disease (COPD) (HCC)    Essential hypertension    Gout    Hypercoagulable state (HCC)    Hyperlipidemia    NAS (obstructive sleep apnea)    Degeneration of intervertebral disc of lumbar region    Type 2 diabetes mellitus with diabetic polyneuropathy, unspecified whether long term insulin use (HCC)    ED (erectile dysfunction)    Edema    Mediastinal lymphadenopathy    Neuropathy    Ventral hernia    Class 2 severe obesity due to excess calories with serious comorbidity and body mass index (BMI) of 36.0 to 36.9 in adult     Multiple lung nodules on CT    Former smoker    Lumbar radiculopathy    Atopic dermatitis    Decreased urine stream    PVD (peripheral vascular disease) (HCC)    Rosacea    Stage 3 chronic kidney disease, unspecified whether stage 3a or 3b CKD (HCC)    Prostate cancer (HCC)    Lower urinary tract symptoms (LUTS)    LUCAS (dyspnea on exertion)      Past Medical and Surgical History:     Past Medical History:   Diagnosis Date    Allergic 1968    Colon polyp     COPD (chronic obstructive pulmonary disease) (HCC)     Elevated PSA     Erectile  dysfunction     H/O colonoscopy 2011    DESC 2011    Hyperlipidemia     Hypertension     Lower urinary tract symptoms (LUTS)     Obesity     Pneumonia 2018    Prostate cancer (HCC)     Pulmonary emboli (HCC)     on Xarelto    Shortness of breath     Sleep apnea     Synovitis and tenosynovitis     Visual impairment      Past Surgical History:   Procedure Laterality Date    BACK SURGERY      COLONOSCOPY      EYE SURGERY      FRACTURE SURGERY      HERNIA REPAIR      KNEE SURGERY Right     LUMBAR LAMINECTOMY      DE BX PROSTATE STRTCTC SATURATION SAMPLING IMG GID N/A 2023    Procedure: TRANSPERINEAL MRI FUSION BIOPSY PROSTATE;  Surgeon: Ana Escalante MD;  Location: [facility];  Service: Urology    DE PLMT INTERSTITIAL DEV RADIAT TX PROSTATE MULT N/A 2023    Procedure: INSERTION OF FIDUCIAL MARKER ,SPACEOAR;  Surgeon: Yoni Andrews MD;  Location: [facility];  Service: Urology    SPINE SURGERY        Family History:     Family History   Problem Relation Age of Onset    Alzheimer's disease Mother     Dementia Mother     Lung cancer Father     Multiple sclerosis Father     Cancer Father     Leukemia Sister     Coronary artery disease Sister     COPD Sister     Cancer Sister       Social History:     Social History     Socioeconomic History    Marital status:      Spouse name: None    Number of children: None    Years of education: None    Highest education level: None   Occupational History    None   Tobacco Use    Smoking status: Former     Current packs/day: 0.00     Average packs/day: 1 pack/day for 39.2 years (39.2 ttl pk-yrs)     Types: Cigarettes     Start date: 3/30/1973     Quit date: 2012     Years since quittin.7     Passive exposure: Past    Smokeless tobacco: Never    Tobacco comments:     Quit    Vaping Use    Vaping status: Never Used   Substance and Sexual Activity    Alcohol use: Yes     Comment: 2 shots a year, seldom    Drug use: No    Sexual  activity: Not Currently     Partners: Female     Birth control/protection: None   Other Topics Concern    None   Social History Narrative    ALWAYS USES SEAT BELTS        EXERCISES REGULARLY-STATIONARY BIKE    SEEING A DENTIST     Social Determinants of Health     Financial Resource Strain: Medium Risk (2/22/2024)    Overall Financial Resource Strain (CARDIA)     Difficulty of Paying Living Expenses: Somewhat hard   Food Insecurity: Not on file   Transportation Needs: No Transportation Needs (2/22/2024)    PRAPARE - Transportation     Lack of Transportation (Medical): No     Lack of Transportation (Non-Medical): No   Physical Activity: Not on file   Stress: Not on file   Social Connections: Not on file   Intimate Partner Violence: Not on file   Housing Stability: Not on file      Medications and Allergies:     Current Outpatient Medications   Medication Sig Dispense Refill    albuterol (PROVENTIL HFA,VENTOLIN HFA) 90 mcg/act inhaler Inhale 2 puffs every 4 (four) hours as needed      allopurinol (ZYLOPRIM) 100 mg tablet TAKE ONE TABLET BY MOUTH DAILY 90 tablet 5    atorvastatin (LIPITOR) 40 mg tablet TAKE 1 TABLET (40 MG TOTAL) BY MOUTH DAILY 90 tablet 3    diazepam (VALIUM) 5 mg tablet TAKE 1 TABLET (5 MG TOTAL) BY MOUTH 2 (TWO) TIMES A  tablet 1    fenofibrate micronized (LOFIBRA) 134 MG capsule TAKE 1 CAPSULE (134 MG TOTAL) BY MOUTH DAILY 90 capsule 3    finasteride (PROSCAR) 5 mg tablet TAKE 1 TABLET (5 MG TOTAL) BY MOUTH DAILY 90 tablet 1    glipiZIDE (GLUCOTROL) 5 mg tablet Take 1 tablet (5 mg total) by mouth daily with breakfast 90 tablet 5    levalbuterol (XOPENEX HFA) 45 mcg/act inhaler Inhale 2 puffs every 6 (six) hours as needed for wheezing      metoprolol succinate (TOPROL-XL) 50 mg 24 hr tablet TAKE 1 TABLET (50 MG TOTAL) BY MOUTH DAILY 90 tablet 3    metroNIDAZOLE (METROGEL) 0.75 % gel Apply topically 2 (two) times a day 45 g 5    Multiple Vitamin (DAILY VALUE MULTIVITAMIN) TABS Take 1  tablet by mouth daily      Omega-3 Fatty Acids (fish oil) 1,000 mg Take 1,000 mg by mouth daily      pregabalin (LYRICA) 300 MG capsule TAKE 1 CAPSULE (300 MG TOTAL) BY MOUTH 2 (TWO) TIMES A  capsule 1    tadalafil (CIALIS) 20 MG tablet Take 1 tablet (20 mg total) by mouth daily as needed for erectile dysfunction 10 tablet 10    tamsulosin (FLOMAX) 0.4 mg TAKE 2 CAPSULES (0.8 MG TOTAL) BY MOUTH 2 (TWO) TIMES A  capsule 5    Trelegy Ellipta 100-62.5-25 MCG/INH inhaler Inhale 1 puff daily      triamcinolone (KENALOG) 0.5 % cream Apply topically 3 (three) times a day 30 g 5    Xarelto 20 MG tablet TAKE ONE TABLET BY MOUTH DAILY 90 tablet 3     No current facility-administered medications for this visit.     Allergies   Allergen Reactions    Penicillins Hives      Immunizations:     Immunization History   Administered Date(s) Administered    COVID-19 PFIZER VACCINE 0.3 ML IM 03/02/2021, 03/23/2021, 10/21/2021    COVID-19 Pfizer mRNA vacc PF umair-sucrose 12 yr and older (Comirnaty) 10/10/2023    COVID-19 Pfizer vac (Umair-sucrose, gray cap) 12 yr+ IM 04/01/2022    INFLUENZA 10/31/2007, 10/29/2008, 10/28/2009, 11/03/2014, 09/18/2015, 09/15/2016, 09/30/2017, 09/28/2018, 09/09/2020, 09/20/2021, 09/28/2023    Influenza, injectable, quadrivalent, preservative free 0.5 mL 09/15/2018    Influenza, seasonal, injectable 09/18/2015, 09/30/2017    Pneumococcal Conjugate 13-Valent 09/21/2015, 09/15/2016    Pneumococcal Polysaccharide PPV23 12/12/2017    Tdap 09/09/2020    Zoster 02/29/2016    Zoster Vaccine Recombinant 09/13/2019, 11/18/2019    influenza, trivalent, adjuvanted 09/13/2019, 09/09/2020      Health Maintenance:         Topic Date Due    Colorectal Cancer Screening  06/28/2029    Hepatitis C Screening  Discontinued         Topic Date Due    Pneumococcal Vaccine: 65+ Years (3 of 3 - PPSV23 or PCV20) 12/12/2022    COVID-19 Vaccine (5 - 2023-24 season) 12/05/2023      Medicare Screening Tests and Risk  Assessments:     Yevgeniy is here for his Subsequent Wellness visit. Last Medicare Wellness visit information reviewed, patient interviewed and updates made to the record today.      Health Risk Assessment:   Patient rates overall health as poor. Patient feels that their physical health rating is slightly worse. Patient is dissatisfied with their life. Eyesight was rated as same. Hearing was rated as same. Patient feels that their emotional and mental health rating is same. Patients states they are sometimes angry. Patient states they are often unusually tired/fatigued. Pain experienced in the last 7 days has been a lot. Patient's pain rating has been 5/10. Patient states that he has experienced no weight loss or gain in last 6 months.     Depression Screening:   PHQ-2 Score: 2      Fall Risk Screening:   In the past year, patient has experienced: no history of falling in past year      Home Safety:  Patient does not have trouble with stairs inside or outside of their home. Patient has working smoke alarms and has working carbon monoxide detector. Home safety hazards include: none.     Nutrition:   Current diet is Regular and Limited junk food.     Medications:   Patient is currently taking over-the-counter supplements. OTC medications include: see medication list. Patient is able to manage medications.     Activities of Daily Living (ADLs)/Instrumental Activities of Daily Living (IADLs):   Walk and transfer into and out of bed and chair?: Yes  Dress and groom yourself?: Yes    Bathe or shower yourself?: Yes    Feed yourself? Yes  Do your laundry/housekeeping?: Yes  Manage your money, pay your bills and track your expenses?: Yes  Make your own meals?: Yes    Do your own shopping?: Yes    Durable Medical Equipment Suppliers  [facility]    Previous Hospitalizations:   Any hospitalizations or ED visits within the last 12 months?: No      Advance Care Planning:   Living will: Yes    Durable POA for healthcare: Yes   "  Advanced directive: Yes    Advanced directive counseling given: Yes    Five wishes given: No    End of Life Decisions reviewed with patient: Yes    Provider agrees with end of life decisions: Yes      PREVENTIVE SCREENINGS      Cardiovascular Screening:    General: Screening Not Indicated, History Lipid Disorder and Screening Current      Diabetes Screening:     General: Screening Not Indicated, History Diabetes and Screening Current      Colorectal Cancer Screening:     General: Screening Current      Prostate Cancer Screening:    General: History Prostate Cancer      Osteoporosis Screening:    General: Screening Not Indicated      Abdominal Aortic Aneurysm (AAA) Screening:    Risk factors include: age between 65-76 yo and tobacco use        General: Screening Current      Lung Cancer Screening:     General: Screening Not Indicated      Hepatitis C Screening:    General: Screening Current    Screening, Brief Intervention, and Referral to Treatment (SBIRT)    Screening  Typical number of drinks in a day: 0  Typical number of drinks in a week: 0  Interpretation: Low risk drinking behavior.    AUDIT-C Screenin) How often did you have a drink containing alcohol in the past year? monthly or less  2) How many drinks did you have on a typical day when you were drinking in the past year? 0  3) How often did you have 6 or more drinks on one occasion in the past year? never    AUDIT-C Score: 1  Interpretation: Score 0-3 (male): Negative screen for alcohol misuse    Single Item Drug Screening:  How often have you used an illegal drug (including marijuana) or a prescription medication for non-medical reasons in the past year? never    Single Item Drug Screen Score: 0  Interpretation: Negative screen for possible drug use disorder    No results found.     Physical Exam:     /64   Pulse 63   Temp 97.6 °F (36.4 °C)   Ht 5' 11\" (1.803 m)   Wt 120 kg (264 lb)   SpO2 95%   BMI 36.82 kg/m²     Physical Exam  Vitals " and nursing note reviewed.   Constitutional:       Appearance: He is well-developed. He is obese.   HENT:      Head: Normocephalic and atraumatic.   Eyes:      Conjunctiva/sclera: Conjunctivae normal.   Cardiovascular:      Rate and Rhythm: Normal rate and regular rhythm.      Pulses: Pulses are weak.           Dorsalis pedis pulses are 1+ on the right side and 1+ on the left side.        Posterior tibial pulses are 1+ on the right side and 1+ on the left side.      Heart sounds: Normal heart sounds.   Pulmonary:      Effort: Pulmonary effort is normal.      Breath sounds: Normal breath sounds.   Musculoskeletal:      Cervical back: Neck supple.      Comments: 1+ bilateral ankle edema.   Feet:      Right foot:      Skin integrity: No ulcer, skin breakdown, erythema, warmth, callus or dry skin.      Left foot:      Skin integrity: No ulcer, skin breakdown, erythema, warmth, callus or dry skin.   Skin:     General: Skin is warm and dry.      Capillary Refill: Capillary refill takes less than 2 seconds.   Neurological:      Mental Status: He is alert.   Psychiatric:         Mood and Affect: Mood normal.      Diabetic Foot Exam    Patient's shoes and socks removed.    Right Foot/Ankle   Right Foot Inspection  Skin Exam: skin normal and skin intact. No dry skin, no warmth, no callus, no erythema, no maceration, no abnormal color, no pre-ulcer, no ulcer and no callus.     Toe Exam: ROM and strength within normal limits.     Sensory   Vibration: diminished  Proprioception: diminished  Monofilament testing: diminished    Vascular  The right DP pulse is 1+. The right PT pulse is 1+.     Left Foot/Ankle  Left Foot Inspection  Skin Exam: skin normal and skin intact. No dry skin, no warmth, no erythema, no maceration, normal color, no pre-ulcer, no ulcer and no callus.     Toe Exam: ROM and strength within normal limits.     Sensory   Vibration: diminished  Proprioception: diminished  Monofilament testing:  diminished    Vascular  The left DP pulse is 1+. The left PT pulse is 1+.     Assign Risk Category  No deformity present  Loss of protective sensation  Weak pulses  Risk: 2      Bari Esqueda MD

## 2024-02-29 NOTE — PATIENT INSTRUCTIONS
Medicare Preventive Visit Patient Instructions  Thank you for completing your Welcome to Medicare Visit or Medicare Annual Wellness Visit today. Your next wellness visit will be due in one year (3/1/2025).  The screening/preventive services that you may require over the next 5-10 years are detailed below. Some tests may not apply to you based off risk factors and/or age. Screening tests ordered at today's visit but not completed yet may show as past due. Also, please note that scanned in results may not display below.  Preventive Screenings:  Service Recommendations Previous Testing/Comments   Colorectal Cancer Screening  Colonoscopy    Fecal Occult Blood Test (FOBT)/Fecal Immunochemical Test (FIT)  Fecal DNA/Cologuard Test  Flexible Sigmoidoscopy Age: 45-75 years old   Colonoscopy: every 10 years (May be performed more frequently if at higher risk)  OR  FOBT/FIT: every 1 year  OR  Cologuard: every 3 years  OR  Sigmoidoscopy: every 5 years  Screening may be recommended earlier than age 45 if at higher risk for colorectal cancer. Also, an individualized decision between you and your healthcare provider will decide whether screening between the ages of 76-85 would be appropriate. Colonoscopy: 06/28/2019  FOBT/FIT: Not on file  Cologuard: Not on file  Sigmoidoscopy: Not on file    Screening Current     Prostate Cancer Screening Individualized decision between patient and health care provider in men between ages of 55-69   Medicare will cover every 12 months beginning on the day after your 50th birthday PSA: 4.44 ng/mL     History Prostate Cancer     Hepatitis C Screening Once for adults born between 1945 and 1965  More frequently in patients at high risk for Hepatitis C Hep C Antibody: Not on file        Diabetes Screening 1-2 times per year if you're at risk for diabetes or have pre-diabetes Fasting glucose: 172 mg/dL (8/30/2023)  A1C: 8.9 % of total Hgb (2/22/2024)  Screening Not Indicated  History Diabetes    Cholesterol Screening Once every 5 years if you don't have a lipid disorder. May order more often based on risk factors. Lipid panel: 02/22/2024  Screening Not Indicated  History Lipid Disorder      Other Preventive Screenings Covered by Medicare:  Abdominal Aortic Aneurysm (AAA) Screening: covered once if your at risk. You're considered to be at risk if you have a family history of AAA or a male between the age of 65-75 who smoking at least 100 cigarettes in your lifetime.  Lung Cancer Screening: covers low dose CT scan once per year if you meet all of the following conditions: (1) Age 55-77; (2) No signs or symptoms of lung cancer; (3) Current smoker or have quit smoking within the last 15 years; (4) You have a tobacco smoking history of at least 20 pack years (packs per day x number of years you smoked); (5) You get a written order from a healthcare provider.  Glaucoma Screening: covered annually if you're considered high risk: (1) You have diabetes OR (2) Family history of glaucoma OR (3)  aged 50 and older OR (4)  American aged 65 and older  Osteoporosis Screening: covered every 2 years if you meet one of the following conditions: (1) Have a vertebral abnormality; (2) On glucocorticoid therapy for more than 3 months; (3) Have primary hyperparathyroidism; (4) On osteoporosis medications and need to assess response to drug therapy.  HIV Screening: covered annually if you're between the age of 15-65. Also covered annually if you are younger than 15 and older than 65 with risk factors for HIV infection. For pregnant patients, it is covered up to 3 times per pregnancy.    Immunizations:  Immunization Recommendations   Influenza Vaccine Annual influenza vaccination during flu season is recommended for all persons aged >= 6 months who do not have contraindications   Pneumococcal Vaccine   * Pneumococcal conjugate vaccine = PCV13 (Prevnar 13), PCV15 (Vaxneuvance), PCV20 (Prevnar 20)  *  Pneumococcal polysaccharide vaccine = PPSV23 (Pneumovax) Adults 19-65 yo with certain risk factors or if 65+ yo  If never received any pneumonia vaccine: recommend Prevnar 20 (PCV20)  Give PCV20 if previously received 1 dose of PCV13 or PPSV23   Hepatitis B Vaccine 3 dose series if at intermediate or high risk (ex: diabetes, end stage renal disease, liver disease)   Respiratory syncytial virus (RSV) Vaccine - COVERED BY MEDICARE PART D  * RSVPreF3 (Arexvy) CDC recommends that adults 60 years of age and older may receive a single dose of RSV vaccine using shared clinical decision-making (SCDM)   Tetanus (Td) Vaccine - COST NOT COVERED BY MEDICARE PART B Following completion of primary series, a booster dose should be given every 10 years to maintain immunity against tetanus. Td may also be given as tetanus wound prophylaxis.   Tdap Vaccine - COST NOT COVERED BY MEDICARE PART B Recommended at least once for all adults. For pregnant patients, recommended with each pregnancy.   Shingles Vaccine (Shingrix) - COST NOT COVERED BY MEDICARE PART B  2 shot series recommended in those 19 years and older who have or will have weakened immune systems or those 50 years and older     Health Maintenance Due:      Topic Date Due   • Colorectal Cancer Screening  06/28/2029   • Hepatitis C Screening  Discontinued     Immunizations Due:      Topic Date Due   • Pneumococcal Vaccine: 65+ Years (3 of 3 - PPSV23 or PCV20) 12/12/2022   • COVID-19 Vaccine (5 - 2023-24 season) 12/05/2023     Advance Directives   What are advance directives?  Advance directives are legal documents that state your wishes and plans for medical care. These plans are made ahead of time in case you lose your ability to make decisions for yourself. Advance directives can apply to any medical decision, such as the treatments you want, and if you want to donate organs.   What are the types of advance directives?  There are many types of advance directives, and each  state has rules about how to use them. You may choose a combination of any of the following:  Living will:  This is a written record of the treatment you want. You can also choose which treatments you do not want, which to limit, and which to stop at a certain time. This includes surgery, medicine, IV fluid, and tube feedings.   Durable power of  for healthcare (DPAHC):  This is a written record that states who you want to make healthcare choices for you when you are unable to make them for yourself. This person, called a proxy, is usually a family member or a friend. You may choose more than 1 proxy.  Do not resuscitate (DNR) order:  A DNR order is used in case your heart stops beating or you stop breathing. It is a request not to have certain forms of treatment, such as CPR. A DNR order may be included in other types of advance directives.  Medical directive:  This covers the care that you want if you are in a coma, near death, or unable to make decisions for yourself. You can list the treatments you want for each condition. Treatment may include pain medicine, surgery, blood transfusions, dialysis, IV or tube feedings, and a ventilator (breathing machine).  Values history:  This document has questions about your views, beliefs, and how you feel and think about life. This information can help others choose the care that you would choose.  Why are advance directives important?  An advance directive helps you control your care. Although spoken wishes may be used, it is better to have your wishes written down. Spoken wishes can be misunderstood, or not followed. Treatments may be given even if you do not want them. An advance directive may make it easier for your family to make difficult choices about your care.   Weight Management   Why it is important to manage your weight:  Being overweight increases your risk of health conditions such as heart disease, high blood pressure, type 2 diabetes, and certain  types of cancer. It can also increase your risk for osteoarthritis, sleep apnea, and other respiratory problems. Aim for a slow, steady weight loss. Even a small amount of weight loss can lower your risk of health problems.  How to lose weight safely:  A safe and healthy way to lose weight is to eat fewer calories and get regular exercise. You can lose up about 1 pound a week by decreasing the number of calories you eat by 500 calories each day.   Healthy meal plan for weight management:  A healthy meal plan includes a variety of foods, contains fewer calories, and helps you stay healthy. A healthy meal plan includes the following:  Eat whole-grain foods more often.  A healthy meal plan should contain fiber. Fiber is the part of grains, fruits, and vegetables that is not broken down by your body. Whole-grain foods are healthy and provide extra fiber in your diet. Some examples of whole-grain foods are whole-wheat breads and pastas, oatmeal, brown rice, and bulgur.  Eat a variety of vegetables every day.  Include dark, leafy greens such as spinach, kale, janna greens, and mustard greens. Eat yellow and orange vegetables such as carrots, sweet potatoes, and winter squash.   Eat a variety of fruits every day.  Choose fresh or canned fruit (canned in its own juice or light syrup) instead of juice. Fruit juice has very little or no fiber.  Eat low-fat dairy foods.  Drink fat-free (skim) milk or 1% milk. Eat fat-free yogurt and low-fat cottage cheese. Try low-fat cheeses such as mozzarella and other reduced-fat cheeses.  Choose meat and other protein foods that are low in fat.  Choose beans or other legumes such as split peas or lentils. Choose fish, skinless poultry (chicken or turkey), or lean cuts of red meat (beef or pork). Before you cook meat or poultry, cut off any visible fat.   Use less fat and oil.  Try baking foods instead of frying them. Add less fat, such as margarine, sour cream, regular salad dressing and  mayonnaise to foods. Eat fewer high-fat foods. Some examples of high-fat foods include french fries, doughnuts, ice cream, and cakes.  Eat fewer sweets.  Limit foods and drinks that are high in sugar. This includes candy, cookies, regular soda, and sweetened drinks.  Exercise:  Exercise at least 30 minutes per day on most days of the week. Some examples of exercise include walking, biking, dancing, and swimming. You can also fit in more physical activity by taking the stairs instead of the elevator or parking farther away from stores. Ask your healthcare provider about the best exercise plan for you.      © Copyright [employer] 2018 Information is for End User's use only and may not be sold, redistributed or otherwise used for commercial purposes. All illustrations and images included in CareNotes® are the copyrighted property of A.D.A.M., Inc. or [employer]

## 2024-02-29 NOTE — ASSESSMENT & PLAN NOTE
Discontinue metformin.  Start glipizide 5 mg every morning.  Lab Results   Component Value Date    HGBA1C 8.9 (H) 02/22/2024

## 2024-03-27 DIAGNOSIS — I82.409 DEEP VEIN THROMBOSIS (DVT) OF LOWER EXTREMITY, UNSPECIFIED CHRONICITY, UNSPECIFIED LATERALITY, UNSPECIFIED VEIN (HCC): ICD-10-CM

## 2024-03-27 RX ORDER — RIVAROXABAN 20 MG/1
TABLET, FILM COATED ORAL
Qty: 90 TABLET | Refills: 3 | Status: SHIPPED | OUTPATIENT
Start: 2024-03-27

## 2024-03-27 NOTE — TELEPHONE ENCOUNTER
Medication:  PDMP   12/31/2020  2  12/31/2020  DIAZEPAM 5 MG TABLET  90 0  23  BR ANNIE  [id number]  PENNS ([id number])  0   [place]    12/28/2020  2  12/28/2020  OXYCODONE HCL 5 MG TABLET  90 0  23  BR ANNIE  [id number]  PENNS ([id number])  0  29 35 MME  [place]icare  [place]    12/24/2020  2  09/28/2020  PREGABALIN 150 MG CAPSULE  90 0  90  BR ANNIE  [id number]  PENNS ([id number])  0   [place]    09/28/2020  2  09/28/2020  OXYCODONE HCL 5 MG TABLET  90 0  23  BR ANNIE  [id number]  PENNS ([id number])  0  29 35 MME  [place]icare  [place]        Active agreement on file -No How Severe Are Your Spot(S)?: moderate What Type Of Note Output Would You Prefer (Optional)?: Standard Output What Is The Reason For Today's Visit?: Skin Lesion What Is The Reason For Today's Visit? (Being Monitored For X): the development of a new lesion

## 2024-04-12 DIAGNOSIS — M51.36 DEGENERATION OF INTERVERTEBRAL DISC OF LUMBAR REGION: ICD-10-CM

## 2024-04-12 NOTE — TELEPHONE ENCOUNTER
Medication:  PDMP   1 [id number] 01/27/2024 11/16/2023 Pregabalin (Capsule) 180.0 90 300 MG NA LANDY DUVAL [facility] Medicare 0 / 1 [place]    1 [id number] 01/17/2024 10/30/2023 diazePAM (Tablet) 180.0 90 5 MG NA LANDY DUVAL [facility] Medicare 1 / 1 [place]            Active agreement on file -No

## 2024-04-14 RX ORDER — DIAZEPAM 5 MG/1
5 TABLET ORAL 2 TIMES DAILY
Qty: 180 TABLET | Refills: 1 | Status: SHIPPED | OUTPATIENT
Start: 2024-04-14 | End: 2024-10-11

## 2024-05-31 ENCOUNTER — TELEPHONE (OUTPATIENT)
Age: 69
End: 2024-05-31

## 2024-06-10 DIAGNOSIS — C61 PROSTATE CANCER (HCC): Primary | ICD-10-CM

## 2024-06-26 DIAGNOSIS — I10 ESSENTIAL HYPERTENSION: ICD-10-CM

## 2024-06-27 RX ORDER — METOPROLOL SUCCINATE 50 MG/1
50 TABLET, EXTENDED RELEASE ORAL DAILY
Qty: 90 TABLET | Refills: 1 | Status: SHIPPED | OUTPATIENT
Start: 2024-06-27

## 2024-06-30 ENCOUNTER — RA CDI HCC (OUTPATIENT)
Dept: OTHER | Facility: HOSPITAL | Age: 69
End: 2024-06-30

## 2024-06-30 NOTE — PROGRESS NOTES
E11.65, e11.51, E11.22  HCC coding opportunities          Chart Reviewed number of suggestions sent to Provider: 3     Patients Insurance     Medicare Insurance: [employer] Advantage

## 2024-07-05 ENCOUNTER — TELEPHONE (OUTPATIENT)
Dept: LAB | Facility: HOSPITAL | Age: 69
End: 2024-07-05

## 2024-07-05 ENCOUNTER — TELEPHONE (OUTPATIENT)
Age: 69
End: 2024-07-05

## 2024-07-05 NOTE — TELEPHONE ENCOUNTER
Patient called to see if lab orders are in the system advised there is orders for the patient  Also gave the mobile lab#

## 2024-07-08 ENCOUNTER — APPOINTMENT (OUTPATIENT)
Age: 69
End: 2024-07-08
Payer: COMMERCIAL

## 2024-07-08 DIAGNOSIS — C61 PROSTATE CANCER (HCC): ICD-10-CM

## 2024-07-08 DIAGNOSIS — E78.5 HYPERLIPIDEMIA, UNSPECIFIED HYPERLIPIDEMIA TYPE: ICD-10-CM

## 2024-07-08 DIAGNOSIS — E11.42 TYPE 2 DIABETES MELLITUS WITH DIABETIC POLYNEUROPATHY, UNSPECIFIED WHETHER LONG TERM INSULIN USE (HCC): ICD-10-CM

## 2024-07-08 LAB
ALBUMIN SERPL BCG-MCNC: 4 G/DL (ref 3.5–5)
ALP SERPL-CCNC: 54 U/L (ref 34–104)
ALT SERPL W P-5'-P-CCNC: 26 U/L (ref 7–52)
ANION GAP SERPL CALCULATED.3IONS-SCNC: 10 MMOL/L (ref 4–13)
AST SERPL W P-5'-P-CCNC: 23 U/L (ref 13–39)
BASOPHILS # BLD AUTO: 0.03 THOUSANDS/ÂΜL (ref 0–0.1)
BASOPHILS NFR BLD AUTO: 1 % (ref 0–1)
BILIRUB SERPL-MCNC: 0.74 MG/DL (ref 0.2–1)
BUN SERPL-MCNC: 18 MG/DL (ref 5–25)
CALCIUM SERPL-MCNC: 10.1 MG/DL (ref 8.4–10.2)
CHLORIDE SERPL-SCNC: 106 MMOL/L (ref 96–108)
CHOLEST SERPL-MCNC: 141 MG/DL
CO2 SERPL-SCNC: 30 MMOL/L (ref 21–32)
CREAT SERPL-MCNC: 1.07 MG/DL (ref 0.6–1.3)
EOSINOPHIL # BLD AUTO: 0.12 THOUSAND/ÂΜL (ref 0–0.61)
EOSINOPHIL NFR BLD AUTO: 2 % (ref 0–6)
ERYTHROCYTE [DISTWIDTH] IN BLOOD BY AUTOMATED COUNT: 13.9 % (ref 11.6–15.1)
EST. AVERAGE GLUCOSE BLD GHB EST-MCNC: 157 MG/DL
GFR SERPL CREATININE-BSD FRML MDRD: 69 ML/MIN/1.73SQ M
GLUCOSE P FAST SERPL-MCNC: 168 MG/DL (ref 65–99)
HBA1C MFR BLD: 7.1 %
HCT VFR BLD AUTO: 46.5 % (ref 36.5–49.3)
HDLC SERPL-MCNC: 41 MG/DL
HGB BLD-MCNC: 14.7 G/DL (ref 12–17)
IMM GRANULOCYTES # BLD AUTO: 0.03 THOUSAND/UL (ref 0–0.2)
IMM GRANULOCYTES NFR BLD AUTO: 1 % (ref 0–2)
LDLC SERPL CALC-MCNC: 75 MG/DL (ref 0–100)
LYMPHOCYTES # BLD AUTO: 1.31 THOUSANDS/ÂΜL (ref 0.6–4.47)
LYMPHOCYTES NFR BLD AUTO: 23 % (ref 14–44)
MCH RBC QN AUTO: 31.5 PG (ref 26.8–34.3)
MCHC RBC AUTO-ENTMCNC: 31.6 G/DL (ref 31.4–37.4)
MCV RBC AUTO: 100 FL (ref 82–98)
MONOCYTES # BLD AUTO: 0.5 THOUSAND/ÂΜL (ref 0.17–1.22)
MONOCYTES NFR BLD AUTO: 9 % (ref 4–12)
NEUTROPHILS # BLD AUTO: 3.74 THOUSANDS/ÂΜL (ref 1.85–7.62)
NEUTS SEG NFR BLD AUTO: 64 % (ref 43–75)
NRBC BLD AUTO-RTO: 0 /100 WBCS
PLATELET # BLD AUTO: 201 THOUSANDS/UL (ref 149–390)
PMV BLD AUTO: 10.2 FL (ref 8.9–12.7)
POTASSIUM SERPL-SCNC: 4.4 MMOL/L (ref 3.5–5.3)
PROT SERPL-MCNC: 6.9 G/DL (ref 6.4–8.4)
PSA SERPL-MCNC: 0.18 NG/ML (ref 0–4)
RBC # BLD AUTO: 4.67 MILLION/UL (ref 3.88–5.62)
SODIUM SERPL-SCNC: 146 MMOL/L (ref 135–147)
TRIGL SERPL-MCNC: 124 MG/DL
WBC # BLD AUTO: 5.73 THOUSAND/UL (ref 4.31–10.16)

## 2024-07-08 PROCEDURE — 80061 LIPID PANEL: CPT

## 2024-07-08 PROCEDURE — 84153 ASSAY OF PSA TOTAL: CPT

## 2024-07-08 PROCEDURE — 85025 COMPLETE CBC W/AUTO DIFF WBC: CPT

## 2024-07-08 PROCEDURE — 83036 HEMOGLOBIN GLYCOSYLATED A1C: CPT

## 2024-07-08 PROCEDURE — 36415 COLL VENOUS BLD VENIPUNCTURE: CPT

## 2024-07-08 PROCEDURE — 80053 COMPREHEN METABOLIC PANEL: CPT

## 2024-07-08 PROCEDURE — 84681 ASSAY OF C-PEPTIDE: CPT

## 2024-07-09 LAB — C PEPTIDE SERPL-MCNC: 5.1 NG/ML (ref 1.1–4.4)

## 2024-07-10 ENCOUNTER — TELEPHONE (OUTPATIENT)
Dept: RADIATION ONCOLOGY | Facility: HOSPITAL | Age: 69
End: 2024-07-10

## 2024-07-10 ENCOUNTER — OFFICE VISIT (OUTPATIENT)
Dept: FAMILY MEDICINE CLINIC | Facility: CLINIC | Age: 69
End: 2024-07-10
Payer: COMMERCIAL

## 2024-07-10 VITALS
HEART RATE: 56 BPM | DIASTOLIC BLOOD PRESSURE: 62 MMHG | BODY MASS INDEX: 37.49 KG/M2 | WEIGHT: 267.8 LBS | SYSTOLIC BLOOD PRESSURE: 112 MMHG | OXYGEN SATURATION: 92 % | HEIGHT: 71 IN

## 2024-07-10 DIAGNOSIS — M10.9 GOUT, UNSPECIFIED CAUSE, UNSPECIFIED CHRONICITY, UNSPECIFIED SITE: ICD-10-CM

## 2024-07-10 DIAGNOSIS — C61 PROSTATE CANCER (HCC): ICD-10-CM

## 2024-07-10 DIAGNOSIS — R39.9 LOWER URINARY TRACT SYMPTOMS (LUTS): ICD-10-CM

## 2024-07-10 DIAGNOSIS — E11.42 TYPE 2 DIABETES MELLITUS WITH DIABETIC POLYNEUROPATHY, UNSPECIFIED WHETHER LONG TERM INSULIN USE (HCC): ICD-10-CM

## 2024-07-10 DIAGNOSIS — R91.8 MULTIPLE LUNG NODULES ON CT: ICD-10-CM

## 2024-07-10 DIAGNOSIS — I10 ESSENTIAL HYPERTENSION: Primary | ICD-10-CM

## 2024-07-10 DIAGNOSIS — E66.01 CLASS 2 SEVERE OBESITY DUE TO EXCESS CALORIES WITH SERIOUS COMORBIDITY AND BODY MASS INDEX (BMI) OF 36.0 TO 36.9 IN ADULT (HCC): ICD-10-CM

## 2024-07-10 DIAGNOSIS — G62.9 NEUROPATHY: ICD-10-CM

## 2024-07-10 DIAGNOSIS — J44.9 CHRONIC OBSTRUCTIVE PULMONARY DISEASE, UNSPECIFIED COPD TYPE (HCC): ICD-10-CM

## 2024-07-10 DIAGNOSIS — N18.30 STAGE 3 CHRONIC KIDNEY DISEASE, UNSPECIFIED WHETHER STAGE 3A OR 3B CKD (HCC): ICD-10-CM

## 2024-07-10 DIAGNOSIS — E78.5 HYPERLIPIDEMIA, UNSPECIFIED HYPERLIPIDEMIA TYPE: ICD-10-CM

## 2024-07-10 DIAGNOSIS — G47.33 OSA (OBSTRUCTIVE SLEEP APNEA): ICD-10-CM

## 2024-07-10 DIAGNOSIS — M51.36 DEGENERATION OF INTERVERTEBRAL DISC OF LUMBAR REGION: ICD-10-CM

## 2024-07-10 DIAGNOSIS — D68.59 HYPERCOAGULABLE STATE (HCC): ICD-10-CM

## 2024-07-10 DIAGNOSIS — I73.9 PVD (PERIPHERAL VASCULAR DISEASE) (HCC): ICD-10-CM

## 2024-07-10 PROCEDURE — G2211 COMPLEX E/M VISIT ADD ON: HCPCS | Performed by: FAMILY MEDICINE

## 2024-07-10 PROCEDURE — 99214 OFFICE O/P EST MOD 30 MIN: CPT | Performed by: FAMILY MEDICINE

## 2024-07-10 NOTE — TELEPHONE ENCOUNTER
Called [facility]. JOHANA/FLORY [phone]. Identified myself, left office number and requested a call back.

## 2024-07-10 NOTE — TELEPHONE ENCOUNTER
----- Message from Maribel Lopez MD sent at 7/10/2024  1:01 PM EDT -----  Please let patient know that his PSA demonstrates good response to treatment. He has no FU scheduled with us at this time.  If he would like to reschedule he is due for follow-up now.

## 2024-07-10 NOTE — PROGRESS NOTES
Depression Screening and Follow-up Plan: Patient was screened for depression during today's encounter. They screened negative with a PHQ-2 score of 1.    Assessment/Plan:  Return visit in 4 months with fasting blood prior to visit       Problem List Items Addressed This Visit       Chronic obstructive pulmonary disease (COPD) ([facility])     Continue Trelegy and albuterol.         Essential hypertension - Primary     Continue Toprol-XL 50 mg daily         Gout     Continue allopurinol 100 mg daily         Hypercoagulable state ([facility])     Continue Xarelto 20 mg daily         Hyperlipidemia     Continue Lipitor 40 mg and fenofibrate 134 mg daily         Relevant Orders    Comprehensive metabolic panel    CBC and differential    TSH, 3rd generation with Free T4 reflex    Lipid Panel with Direct LDL reflex    NAS (obstructive sleep apnea)    Degeneration of intervertebral disc of lumbar region    Type 2 diabetes mellitus with diabetic polyneuropathy, unspecified whether long term insulin use ([facility])       Lab Results   Component Value Date    HGBA1C 7.1 (H) 07/08/2024   Continue glipizide 5 mg daily         Relevant Orders    Hemoglobin A1C    Albumin / creatinine urine ratio    Neuropathy     Continue Lyrica 300 mg twice daily         Class 2 severe obesity due to excess calories with serious comorbidity and body mass index (BMI) of 36.0 to 36.9 in adult ([facility])    Multiple lung nodules on CT    PVD (peripheral vascular disease) ([facility])    Stage 3 chronic kidney disease, unspecified whether stage 3a or 3b CKD ([facility])    Prostate cancer ([facility])    Lower urinary tract symptoms (LUTS)     Continue finasteride 5 mg and tamsulosin 0.4 mg daily              Subjective:      Patient ID: Yevgeniy Beckford is a 69 y.o. male.    Patient comes in for checkup.  He complains of inability to do things because of poor breathing and neuropathy of his legs and feet.  This has caused him to gain weight.        The following portions of the patient's  history were reviewed and updated as appropriate:   Past Medical History:  He has a past medical history of Allergic (1968), Colon polyp, COPD (chronic obstructive pulmonary disease) (HCC), Elevated PSA, Erectile dysfunction, H/O colonoscopy (08/22/2011), Hyperlipidemia, Hypertension, Lower urinary tract symptoms (LUTS), Obesity, Pneumonia (11/25/2018), Prostate cancer (HCC), Pulmonary emboli (HCC), Shortness of breath, Sleep apnea, Synovitis and tenosynovitis, and Visual impairment.,  _______________________________________________________________________  Medical Problems:  does not have any pertinent problems on file.,  _______________________________________________________________________  Past Surgical History:   has a past surgical history that includes Knee surgery (Right); Lumbar laminectomy; Hernia repair; Back surgery; Colonoscopy; pr bx prostate strtctc saturation sampling img gid (N/A, 06/13/2023); pr plmt interstitial dev radiat tx prostate 1/mult (N/A, 09/12/2023); Eye surgery; Fracture surgery (05/31/01970); and Spine surgery.,  _______________________________________________________________________  Family History:  family history includes Alzheimer's disease in his mother; COPD in his sister; Cancer in his father and sister; Coronary artery disease in his sister; Dementia in his mother; Leukemia in his sister; Lung cancer in his father; Multiple sclerosis in his father.,  _______________________________________________________________________  Social History:   reports that he quit smoking about 12 years ago. His smoking use included cigarettes. He started smoking about 51 years ago. He has a 39.2 pack-year smoking history. He has been exposed to tobacco smoke. He has never used smokeless tobacco. He reports current alcohol use. He reports that he does not use drugs.,  _______________________________________________________________________  Allergies:  is allergic to  penicillins..  _______________________________________________________________________  Current Outpatient Medications   Medication Sig Dispense Refill    albuterol (PROVENTIL HFA,VENTOLIN HFA) 90 mcg/act inhaler Inhale 2 puffs every 4 (four) hours as needed      allopurinol (ZYLOPRIM) 100 mg tablet TAKE ONE TABLET BY MOUTH DAILY 90 tablet 5    atorvastatin (LIPITOR) 40 mg tablet TAKE 1 TABLET (40 MG TOTAL) BY MOUTH DAILY 90 tablet 3    diazepam (VALIUM) 5 mg tablet TAKE 1 TABLET (5 MG TOTAL) BY MOUTH 2 (TWO) TIMES A  tablet 1    fenofibrate micronized (LOFIBRA) 134 MG capsule TAKE 1 CAPSULE (134 MG TOTAL) BY MOUTH DAILY 90 capsule 3    finasteride (PROSCAR) 5 mg tablet TAKE 1 TABLET (5 MG TOTAL) BY MOUTH DAILY 90 tablet 1    glipiZIDE (GLUCOTROL) 5 mg tablet Take 1 tablet (5 mg total) by mouth daily with breakfast 90 tablet 5    levalbuterol (XOPENEX HFA) 45 mcg/act inhaler Inhale 2 puffs every 6 (six) hours as needed for wheezing      metoprolol succinate (TOPROL-XL) 50 mg 24 hr tablet TAKE 1 TABLET (50 MG TOTAL) BY MOUTH DAILY 90 tablet 1    metroNIDAZOLE (METROGEL) 0.75 % gel Apply topically 2 (two) times a day 45 g 5    Multiple Vitamin (DAILY VALUE MULTIVITAMIN) TABS Take 1 tablet by mouth daily      Omega-3 Fatty Acids (fish oil) 1,000 mg Take 1,000 mg by mouth daily      pregabalin (LYRICA) 300 MG capsule TAKE 1 CAPSULE (300 MG TOTAL) BY MOUTH 2 (TWO) TIMES A  capsule 1    tadalafil (CIALIS) 20 MG tablet Take 1 tablet (20 mg total) by mouth daily as needed for erectile dysfunction 10 tablet 10    tamsulosin (FLOMAX) 0.4 mg TAKE 2 CAPSULES (0.8 MG TOTAL) BY MOUTH 2 (TWO) TIMES A  capsule 5    Trelegy Ellipta 100-62.5-25 MCG/INH inhaler Inhale 1 puff daily      triamcinolone (KENALOG) 0.5 % cream Apply topically 3 (three) times a day 30 g 5    Xarelto 20 MG tablet TAKE ONE TABLET BY MOUTH DAILY 90 tablet 3     No current facility-administered medications for this visit.  "    _______________________________________________________________________  Review of Systems   Constitutional: Negative.    Respiratory:  Positive for cough and shortness of breath.    Cardiovascular:  Positive for leg swelling. Negative for chest pain and palpitations.   Neurological:  Positive for numbness.         Objective:  Vitals:    07/10/24 1131   BP: 112/62   BP Location: Right arm   Patient Position: Sitting   Cuff Size: Large   Pulse: 56   SpO2: 92%   Weight: 121 kg (267 lb 12.8 oz)   Height: 5' 11\" (1.803 m)     Body mass index is 37.35 kg/m².     Physical Exam  Constitutional:       Appearance: He is well-developed. He is obese.   HENT:      Head: Normocephalic and atraumatic.   Eyes:      Pupils: Pupils are equal, round, and reactive to light.   Cardiovascular:      Rate and Rhythm: Normal rate and regular rhythm.      Heart sounds: Normal heart sounds.   Pulmonary:      Effort: Pulmonary effort is normal.      Breath sounds: Normal breath sounds.   Abdominal:      General: Bowel sounds are normal.      Palpations: Abdomen is soft.   Musculoskeletal:      Cervical back: Neck supple.      Comments: 2+ pretibial edema   Skin:     General: Skin is warm and dry.   Neurological:      Mental Status: He is alert.   Psychiatric:         Mood and Affect: Mood normal.         Behavior: Behavior normal.         "

## 2024-07-27 DIAGNOSIS — G89.4 CHRONIC PAIN SYNDROME: ICD-10-CM

## 2024-07-27 DIAGNOSIS — N40.1 BENIGN PROSTATIC HYPERPLASIA WITH URINARY HESITANCY: ICD-10-CM

## 2024-07-27 DIAGNOSIS — R39.11 BENIGN PROSTATIC HYPERPLASIA WITH URINARY HESITANCY: ICD-10-CM

## 2024-07-27 DIAGNOSIS — E79.0 HYPERURICEMIA: ICD-10-CM

## 2024-07-28 RX ORDER — ALLOPURINOL 100 MG/1
TABLET ORAL
Qty: 100 TABLET | Refills: 1 | Status: SHIPPED | OUTPATIENT
Start: 2024-07-28

## 2024-07-29 RX ORDER — PREGABALIN 300 MG/1
300 CAPSULE ORAL 2 TIMES DAILY
Qty: 180 CAPSULE | Refills: 1 | Status: SHIPPED | OUTPATIENT
Start: 2024-07-29

## 2024-07-29 RX ORDER — FINASTERIDE 5 MG/1
5 TABLET, FILM COATED ORAL DAILY
Qty: 100 TABLET | Refills: 1 | Status: SHIPPED | OUTPATIENT
Start: 2024-07-29

## 2024-10-02 DIAGNOSIS — M51.369 DEGENERATION OF INTERVERTEBRAL DISC OF LUMBAR REGION: ICD-10-CM

## 2024-10-03 RX ORDER — DIAZEPAM 5 MG
5 TABLET ORAL 2 TIMES DAILY
Qty: 180 TABLET | Refills: 1 | Status: SHIPPED | OUTPATIENT
Start: 2024-10-03 | End: 2025-04-01

## 2024-11-11 ENCOUNTER — TELEPHONE (OUTPATIENT)
Age: 69
End: 2024-11-11

## 2024-11-12 ENCOUNTER — RA CDI HCC (OUTPATIENT)
Dept: OTHER | Facility: HOSPITAL | Age: 69
End: 2024-11-12

## 2024-11-13 LAB
ALBUMIN SERPL-MCNC: 4.4 G/DL (ref 3.6–5.1)
ALBUMIN/CREAT UR: 42 MG/G CREAT
ALBUMIN/GLOB SERPL: 1.8 (CALC) (ref 1–2.5)
ALP SERPL-CCNC: 51 U/L (ref 35–144)
ALT SERPL-CCNC: 23 U/L (ref 9–46)
AST SERPL-CCNC: 20 U/L (ref 10–35)
BASOPHILS # BLD AUTO: 58 CELLS/UL (ref 0–200)
BASOPHILS NFR BLD AUTO: 0.7 %
BILIRUB SERPL-MCNC: 0.8 MG/DL (ref 0.2–1.2)
BUN SERPL-MCNC: 22 MG/DL (ref 7–25)
BUN/CREAT SERPL: 17 (CALC) (ref 6–22)
CALCIUM SERPL-MCNC: 10.1 MG/DL (ref 8.6–10.3)
CHLORIDE SERPL-SCNC: 101 MMOL/L (ref 98–110)
CHOLEST SERPL-MCNC: 145 MG/DL
CHOLEST/HDLC SERPL: 3.5 (CALC)
CO2 SERPL-SCNC: 30 MMOL/L (ref 20–32)
CREAT SERPL-MCNC: 1.33 MG/DL (ref 0.7–1.28)
CREAT UR-MCNC: 223 MG/DL (ref 20–320)
EOSINOPHIL # BLD AUTO: 232 CELLS/UL (ref 15–500)
EOSINOPHIL NFR BLD AUTO: 2.8 %
ERYTHROCYTE [DISTWIDTH] IN BLOOD BY AUTOMATED COUNT: 13 % (ref 11–15)
GFR/BSA.PRED SERPLBLD CYS-BASED-ARV: 58 ML/MIN/1.73M2
GLOBULIN SER CALC-MCNC: 2.4 G/DL (CALC) (ref 1.9–3.7)
GLUCOSE SERPL-MCNC: 147 MG/DL (ref 65–99)
HBA1C MFR BLD: 7.8 % OF TOTAL HGB
HCT VFR BLD AUTO: 47.3 % (ref 38.5–50)
HDLC SERPL-MCNC: 41 MG/DL
HGB BLD-MCNC: 15.9 G/DL (ref 13.2–17.1)
LDLC SERPL CALC-MCNC: 81 MG/DL (CALC)
LYMPHOCYTES # BLD AUTO: 2507 CELLS/UL (ref 850–3900)
LYMPHOCYTES NFR BLD AUTO: 30.2 %
MCH RBC QN AUTO: 31.5 PG (ref 27–33)
MCHC RBC AUTO-ENTMCNC: 33.6 G/DL (ref 32–36)
MCV RBC AUTO: 93.8 FL (ref 80–100)
MICROALBUMIN UR-MCNC: 9.3 MG/DL
MONOCYTES # BLD AUTO: 681 CELLS/UL (ref 200–950)
MONOCYTES NFR BLD AUTO: 8.2 %
NEUTROPHILS # BLD AUTO: 4822 CELLS/UL (ref 1500–7800)
NEUTROPHILS NFR BLD AUTO: 58.1 %
NONHDLC SERPL-MCNC: 104 MG/DL (CALC)
PLATELET # BLD AUTO: 249 THOUSAND/UL (ref 140–400)
PMV BLD REES-ECKER: 9.8 FL (ref 7.5–12.5)
POTASSIUM SERPL-SCNC: 3.6 MMOL/L (ref 3.5–5.3)
PROT SERPL-MCNC: 6.8 G/DL (ref 6.1–8.1)
RBC # BLD AUTO: 5.04 MILLION/UL (ref 4.2–5.8)
SODIUM SERPL-SCNC: 141 MMOL/L (ref 135–146)
TRIGL SERPL-MCNC: 131 MG/DL
TSH SERPL-ACNC: 2.49 MIU/L (ref 0.4–4.5)
WBC # BLD AUTO: 8.3 THOUSAND/UL (ref 3.8–10.8)

## 2024-11-19 ENCOUNTER — OFFICE VISIT (OUTPATIENT)
Dept: FAMILY MEDICINE CLINIC | Facility: CLINIC | Age: 69
End: 2024-11-19
Payer: COMMERCIAL

## 2024-11-19 VITALS
HEIGHT: 71 IN | DIASTOLIC BLOOD PRESSURE: 70 MMHG | WEIGHT: 272 LBS | RESPIRATION RATE: 18 BRPM | OXYGEN SATURATION: 95 % | SYSTOLIC BLOOD PRESSURE: 128 MMHG | HEART RATE: 80 BPM | BODY MASS INDEX: 38.08 KG/M2

## 2024-11-19 DIAGNOSIS — N18.30 STAGE 3 CHRONIC KIDNEY DISEASE, UNSPECIFIED WHETHER STAGE 3A OR 3B CKD (HCC): ICD-10-CM

## 2024-11-19 DIAGNOSIS — I10 ESSENTIAL HYPERTENSION: Primary | ICD-10-CM

## 2024-11-19 DIAGNOSIS — E78.5 HYPERLIPIDEMIA, UNSPECIFIED HYPERLIPIDEMIA TYPE: ICD-10-CM

## 2024-11-19 DIAGNOSIS — G47.33 OSA (OBSTRUCTIVE SLEEP APNEA): ICD-10-CM

## 2024-11-19 DIAGNOSIS — J96.12 CHRONIC RESPIRATORY FAILURE WITH HYPOXIA AND HYPERCAPNIA (HCC): ICD-10-CM

## 2024-11-19 DIAGNOSIS — C61 PROSTATE CANCER (HCC): ICD-10-CM

## 2024-11-19 DIAGNOSIS — E79.0 HYPERURICEMIA: ICD-10-CM

## 2024-11-19 DIAGNOSIS — R39.9 LOWER URINARY TRACT SYMPTOMS (LUTS): ICD-10-CM

## 2024-11-19 DIAGNOSIS — E66.01 CLASS 2 SEVERE OBESITY DUE TO EXCESS CALORIES WITH SERIOUS COMORBIDITY AND BODY MASS INDEX (BMI) OF 36.0 TO 36.9 IN ADULT (HCC): ICD-10-CM

## 2024-11-19 DIAGNOSIS — D68.59 HYPERCOAGULABLE STATE (HCC): ICD-10-CM

## 2024-11-19 DIAGNOSIS — J96.11 CHRONIC RESPIRATORY FAILURE WITH HYPOXIA AND HYPERCAPNIA (HCC): ICD-10-CM

## 2024-11-19 DIAGNOSIS — E11.42 TYPE 2 DIABETES MELLITUS WITH DIABETIC POLYNEUROPATHY, UNSPECIFIED WHETHER LONG TERM INSULIN USE (HCC): ICD-10-CM

## 2024-11-19 DIAGNOSIS — M51.360 DEGENERATION OF INTERVERTEBRAL DISC OF LUMBAR REGION WITH DISCOGENIC BACK PAIN: ICD-10-CM

## 2024-11-19 DIAGNOSIS — J44.9 CHRONIC OBSTRUCTIVE PULMONARY DISEASE, UNSPECIFIED COPD TYPE (HCC): ICD-10-CM

## 2024-11-19 DIAGNOSIS — E66.812 CLASS 2 SEVERE OBESITY DUE TO EXCESS CALORIES WITH SERIOUS COMORBIDITY AND BODY MASS INDEX (BMI) OF 36.0 TO 36.9 IN ADULT (HCC): ICD-10-CM

## 2024-11-19 PROBLEM — R91.8 MULTIPLE LUNG NODULES ON CT: Status: RESOLVED | Noted: 2019-09-12 | Resolved: 2024-11-19

## 2024-11-19 PROCEDURE — 99214 OFFICE O/P EST MOD 30 MIN: CPT | Performed by: FAMILY MEDICINE

## 2024-11-19 PROCEDURE — G2211 COMPLEX E/M VISIT ADD ON: HCPCS | Performed by: FAMILY MEDICINE

## 2024-11-19 RX ORDER — GLIPIZIDE 5 MG/1
5 TABLET ORAL
Qty: 90 TABLET | Refills: 5 | Status: SHIPPED | OUTPATIENT
Start: 2024-11-19

## 2024-11-19 NOTE — ASSESSMENT & PLAN NOTE
Continue Trelegy Ellipta, Xopenex and albuterol.  Follow-up with pulmonology.  Advised to discuss fatigue with his pulmonologist.  Also should have lung cancer screening

## 2024-11-19 NOTE — PROGRESS NOTES
Assessment/Plan:    Return visit in 4 months with fasting blood prior to visit     Problem List Items Addressed This Visit       Chronic obstructive pulmonary disease (COPD) (HCC)    Continue Trelegy Ellipta, Xopenex and albuterol.  Follow-up with pulmonology.  Advised to discuss fatigue with his pulmonologist.  Also should have lung cancer screening         Essential hypertension - Primary    Continue Toprol-XL 50 mg daily         Hypercoagulable state (HCC)    Continue Xarelto 20 mg         Hyperlipidemia    Continue Lipitor 40 mg daily and fenofibrate 134 mg         Relevant Orders    Comprehensive metabolic panel    CBC and differential    TSH, 3rd generation with Free T4 reflex    NAS (obstructive sleep apnea)    Degeneration of intervertebral disc of lumbar region    Type 2 diabetes mellitus with diabetic polyneuropathy, unspecified whether long term insulin use (HCC)    Relevant Medications    glipiZIDE (GLUCOTROL) 5 mg tablet    Other Relevant Orders    Hemoglobin A1C    Class 2 severe obesity due to excess calories with serious comorbidity and body mass index (BMI) of 36.0 to 36.9 in adult ([facility])    Stage 3 chronic kidney disease, unspecified whether stage 3a or 3b CKD ([facility])    Prostate cancer (HCC)    Lower urinary tract symptoms (LUTS)    Hyperuricemia    Continue allopurinol 100 mg daily         Relevant Orders    Uric acid    Chronic respiratory failure with hypoxia and hypercapnia ([facility])         Subjective:      Patient ID: Yevgeniy Beckford is a 69 y.o. male.    Patient comes in for checkup.  He complains of fatigue.  He ran out of glipizide several months ago        The following portions of the patient's history were reviewed and updated as appropriate:   Past Medical History:  He has a past medical history of Allergic (1968), Colon polyp, COPD (chronic obstructive pulmonary disease) (HCC), Elevated PSA, Erectile dysfunction, H/O colonoscopy (08/22/2011), Hyperlipidemia, Hypertension, Lower urinary  tract symptoms (LUTS), Obesity, Pneumonia (11/25/2018), Prostate cancer (HCC), Pulmonary emboli (HCC), Shortness of breath, Sleep apnea, Synovitis and tenosynovitis, and Visual impairment.,  _______________________________________________________________________  Medical Problems:  does not have any pertinent problems on file.,  _______________________________________________________________________  Past Surgical History:   has a past surgical history that includes Knee surgery (Right); Lumbar laminectomy; Hernia repair; Back surgery; Colonoscopy; pr bx prostate strtctc saturation sampling img gid (N/A, 06/13/2023); pr plmt interstitial dev radiat tx prostate 1/mult (N/A, 09/12/2023); Eye surgery; Fracture surgery (05/31/01970); and Spine surgery.,  _______________________________________________________________________  Family History:  family history includes Alzheimer's disease in his mother; COPD in his sister; Cancer in his father and sister; Coronary artery disease in his sister; Dementia in his mother; Leukemia in his sister; Lung cancer in his father; Multiple sclerosis in his father.,  _______________________________________________________________________  Social History:   reports that he quit smoking about 12 years ago. His smoking use included cigarettes. He started smoking about 51 years ago. He has a 39.2 pack-year smoking history. He has been exposed to tobacco smoke. He has never used smokeless tobacco. He reports current alcohol use. He reports that he does not use drugs.,  _______________________________________________________________________  Allergies:  is allergic to penicillins..  _______________________________________________________________________  Current Outpatient Medications   Medication Sig Dispense Refill    albuterol (PROVENTIL HFA,VENTOLIN HFA) 90 mcg/act inhaler Inhale 2 puffs every 4 (four) hours as needed      allopurinol (ZYLOPRIM) 100 mg tablet TAKE ONE TABLET BY MOUTH DAILY  "100 tablet 1    atorvastatin (LIPITOR) 40 mg tablet TAKE 1 TABLET (40 MG TOTAL) BY MOUTH DAILY 90 tablet 3    diazepam (VALIUM) 5 mg tablet TAKE 1 TABLET (5 MG TOTAL) BY MOUTH 2 (TWO) TIMES A  tablet 1    fenofibrate micronized (LOFIBRA) 134 MG capsule TAKE 1 CAPSULE (134 MG TOTAL) BY MOUTH DAILY 90 capsule 3    finasteride (PROSCAR) 5 mg tablet TAKE 1 TABLET (5 MG TOTAL) BY MOUTH DAILY 100 tablet 1    glipiZIDE (GLUCOTROL) 5 mg tablet Take 1 tablet (5 mg total) by mouth daily with breakfast 90 tablet 5    levalbuterol (XOPENEX HFA) 45 mcg/act inhaler Inhale 2 puffs every 6 (six) hours as needed for wheezing      metoprolol succinate (TOPROL-XL) 50 mg 24 hr tablet TAKE 1 TABLET (50 MG TOTAL) BY MOUTH DAILY 90 tablet 1    metroNIDAZOLE (METROGEL) 0.75 % gel Apply topically 2 (two) times a day 45 g 5    Multiple Vitamin (DAILY VALUE MULTIVITAMIN) TABS Take 1 tablet by mouth daily      Omega-3 Fatty Acids (fish oil) 1,000 mg Take 1,000 mg by mouth daily      pregabalin (LYRICA) 300 MG capsule TAKE 1 CAPSULE (300 MG TOTAL) BY MOUTH 2 (TWO) TIMES A  capsule 1    tadalafil (CIALIS) 20 MG tablet Take 1 tablet (20 mg total) by mouth daily as needed for erectile dysfunction 10 tablet 10    Trelegy Ellipta 100-62.5-25 MCG/INH inhaler Inhale 1 puff daily      triamcinolone (KENALOG) 0.5 % cream Apply topically 3 (three) times a day 30 g 5    Xarelto 20 MG tablet TAKE ONE TABLET BY MOUTH DAILY 90 tablet 3     No current facility-administered medications for this visit.     _______________________________________________________________________  Review of Systems   Constitutional:  Positive for fatigue.   Respiratory:  Positive for shortness of breath.    Cardiovascular:  Positive for leg swelling.         Objective:  Vitals:    11/19/24 1059   BP: 128/70   BP Location: Left arm   Patient Position: Sitting   Cuff Size: Large   Pulse: 80   Resp: 18   SpO2: 95%   Weight: 123 kg (272 lb)   Height: 5' 11\" (1.803 m) "     Body mass index is 37.94 kg/m².     Physical Exam  Constitutional:       Appearance: He is well-developed. He is obese.      Comments: On nasal O2   HENT:      Head: Normocephalic and atraumatic.   Eyes:      Pupils: Pupils are equal, round, and reactive to light.   Cardiovascular:      Rate and Rhythm: Normal rate and regular rhythm.      Pulses: no weak pulses.           Dorsalis pedis pulses are 1+ on the right side and 1+ on the left side.        Posterior tibial pulses are 1+ on the right side and 1+ on the left side.      Heart sounds: Normal heart sounds.   Pulmonary:      Effort: Pulmonary effort is normal.      Breath sounds: Normal breath sounds.   Musculoskeletal:      Cervical back: Neck supple.      Comments: 1+ bilateral pretibial edema   Feet:      Right foot:      Skin integrity: No ulcer, skin breakdown, erythema, warmth, callus or dry skin.      Left foot:      Skin integrity: No ulcer, skin breakdown, erythema, warmth, callus or dry skin.   Skin:     General: Skin is warm and dry.   Neurological:      Mental Status: He is alert.   Psychiatric:         Mood and Affect: Mood normal.         Behavior: Behavior normal.       Diabetic Foot Exam    Patient's shoes and socks removed.    Right Foot/Ankle   Right Foot Inspection  Skin Exam: skin normal and skin intact. No dry skin, no warmth, no callus, no erythema, no maceration, no abnormal color, no pre-ulcer, no ulcer and no callus.     Toe Exam: ROM and strength within normal limits.     Sensory   Vibration: intact  Proprioception: intact  Monofilament testing: intact    Vascular  The right DP pulse is 1+. The right PT pulse is 1+.     Left Foot/Ankle  Left Foot Inspection  Skin Exam: skin normal and skin intact. No dry skin, no warmth, no erythema, no maceration, normal color, no pre-ulcer, no ulcer and no callus.     Toe Exam: ROM and strength within normal limits.     Sensory   Vibration: intact  Proprioception: intact  Monofilament testing:  intact    Vascular  The left DP pulse is 1+. The left PT pulse is 1+.     Assign Risk Category  No deformity present  No loss of protective sensation  No weak pulses  Risk: 0

## 2024-12-14 DIAGNOSIS — I10 ESSENTIAL HYPERTENSION: ICD-10-CM

## 2024-12-15 RX ORDER — METOPROLOL SUCCINATE 50 MG/1
50 TABLET, EXTENDED RELEASE ORAL DAILY
Qty: 90 TABLET | Refills: 1 | Status: SHIPPED | OUTPATIENT
Start: 2024-12-15

## 2025-01-06 ENCOUNTER — NURSING HOME VISIT (OUTPATIENT)
Dept: GERIATRICS | Facility: OTHER | Age: 70
End: 2025-01-06
Payer: COMMERCIAL

## 2025-01-06 DIAGNOSIS — E11.42 TYPE 2 DIABETES MELLITUS WITH DIABETIC POLYNEUROPATHY, UNSPECIFIED WHETHER LONG TERM INSULIN USE (HCC): ICD-10-CM

## 2025-01-06 DIAGNOSIS — J44.9 CHRONIC OBSTRUCTIVE PULMONARY DISEASE, UNSPECIFIED COPD TYPE (HCC): ICD-10-CM

## 2025-01-06 DIAGNOSIS — G47.33 OSA (OBSTRUCTIVE SLEEP APNEA): ICD-10-CM

## 2025-01-06 DIAGNOSIS — R26.2 AMBULATORY DYSFUNCTION: Primary | ICD-10-CM

## 2025-01-06 DIAGNOSIS — C61 PROSTATE CANCER (HCC): ICD-10-CM

## 2025-01-06 DIAGNOSIS — I10 ESSENTIAL HYPERTENSION: ICD-10-CM

## 2025-01-06 PROCEDURE — 99306 1ST NF CARE HIGH MDM 50: CPT | Performed by: FAMILY MEDICINE

## 2025-01-06 RX ORDER — BUMETANIDE 0.5 MG/1
1 TABLET ORAL DAILY
COMMUNITY
Start: 2024-12-10 | End: 2025-12-10

## 2025-01-06 NOTE — PROGRESS NOTES
[facility]  [address]  [place] [id number]   [facility]  History and Physical    NAME: Yevgeniy Beckford  AGE: 69 y.o. SEX: male [phone]    DATE OF ENCOUNTER: 1/6/2025    Code status:   DNR/DNI    Assessment and Plan     1. Ambulatory dysfunction (Primary)  History of degenerative intervertebral disc of lumbar region with radiculopathy. Of note, last MRI lumbar on 2021 did show severe right greater than left neural foraminal stenosis at L2-3 and severe left and more moderate right neural foraminal stenosis at L5-S1.  Workup in the ED [facility] was nonspecific  No red flags noted  Does not use walking aids at home  Will benefit from continued PT    2. Essential hypertension  BP controlled; continue Toprol XL 50 mg qd    3. Chronic obstructive pulmonary disease, unspecified COPD type (HCC)  On chronic 3-4 L oxygen daily  Continue Trelegy Ellipta qd and Levalbuterol Q6H PRN    4. NAS (obstructive sleep apnea)  Continue CPAP at night    5. Type 2 diabetes mellitus with diabetic polyneuropathy, unspecified whether long term insulin use (HCC)  Lab Results   Component Value Date    HGBA1C 7.8 (H) 11/12/2024   Continue Glipize 5 mg daily  Continue Lipitor 40 mg qd  Monitor BG  Diabetic diet    6. Prostate cancer (HCC)  Continue Proscar 5 mg qd, Tadalafil 20 mg qd PRN    All medications and routine orders were reviewed and updated as needed.    Plan discussed with: Patient    Chief Complaint     Seen for admission at Nursing Facility    History of Present Illness     Mr. Beckford is a 69 year old male with PMH of HTN, PVD, COPD on 3-4 L, NAS, DM2, CKD3, prostate cancer, HLD, hypercoagulable state on Xarelto who presented on 12/29 to [facility] Emergency Department via EMS from home with concerns of weakness and fatigue. Per chart review, patient normally sleeps on his recliner, however this tipped back and he could not get out of his recliner for 12 hours until a friend helped  "him to get up. He was evaluated in the ED by PT/OT who recommended post acute rehabilitation.     In my evaluation, patient reports today he feels better. He reports that he at baseline does not use any walking aids. He states his legs just felt week, which he notes he has had several back surgeries and has been having \"issues ever since\". He states he worked with physical therapy earlier today and went well. Denies CP, SOB, dizziness, fatigue, lightheadedness.    He lives at home alone.    HISTORY:  Past Medical History:   Diagnosis Date    Allergic     Colon polyp     COPD (chronic obstructive pulmonary disease) (HCC)     Elevated PSA     Erectile dysfunction     H/O colonoscopy 2011    DESC 2011    Hyperlipidemia     Hypertension     Lower urinary tract symptoms (LUTS)     Obesity     Pneumonia 2018    Prostate cancer (HCC)     Pulmonary emboli (HCC)     on Xarelto    Shortness of breath     Sleep apnea     Synovitis and tenosynovitis     Visual impairment      Family History   Problem Relation Age of Onset    Alzheimer's disease Mother     Dementia Mother     Lung cancer Father     Multiple sclerosis Father     Cancer Father     Leukemia Sister     Coronary artery disease Sister     COPD Sister     Cancer Sister      Social History     Socioeconomic History    Marital status:      Spouse name: Not on file    Number of children: Not on file    Years of education: Not on file    Highest education level: Not on file   Occupational History    Not on file   Tobacco Use    Smoking status: Former     Current packs/day: 0.00     Average packs/day: 1 pack/day for 39.2 years (39.2 ttl pk-yrs)     Types: Cigarettes     Start date: 3/30/1973     Quit date: 2012     Years since quittin.5     Passive exposure: Past    Smokeless tobacco: Never    Tobacco comments:     Quit    Vaping Use    Vaping status: Never Used   Substance and Sexual Activity    Alcohol use: Yes     Comment: 2 " shots a year, seldom    Drug use: No    Sexual activity: Not Currently     Partners: Female     Birth control/protection: None   Other Topics Concern    Not on file   Social History Narrative    ALWAYS USES SEAT BELTS        EXERCISES REGULARLY-STATIONARY BIKE    SEEING A DENTIST     Social Drivers of Health     Financial Resource Strain: Medium Risk (2/22/2024)    Overall Financial Resource Strain (CARDIA)     Difficulty of Paying Living Expenses: Somewhat hard   Food Insecurity: Not on file   Transportation Needs: No Transportation Needs (2/22/2024)    PRAPARE - Transportation     Lack of Transportation (Medical): No     Lack of Transportation (Non-Medical): No   Physical Activity: Not on file   Stress: Not on file   Social Connections: Unknown (6/18/2024)    Received from [employer]     How often do you feel lonely or isolated from those around you? (Adult - for ages 18 years and over): Not on file   Intimate Partner Violence: Not on file   Housing Stability: Not on file       Allergies:  Allergies   Allergen Reactions    Penicillins Hives       Review of Systems     Review of Systems   Constitutional:  Negative for fatigue.   Respiratory:  Negative for shortness of breath.    Cardiovascular:  Negative for chest pain.   Gastrointestinal:  Negative for constipation, diarrhea, nausea and vomiting.   Musculoskeletal:  Positive for back pain.   Neurological:  Negative for dizziness, weakness, light-headedness and numbness.       Medications and orders     All medications reviewed and updated in custodial EMR.      Objective     Vitals: 97.1 F, HR 76, RR 16, 132/68, 96%, 264 lbs    Physical Exam  Constitutional:       General: He is not in acute distress.     Appearance: He is not ill-appearing.      Interventions: Nasal cannula in place.   HENT:      Head: Normocephalic and atraumatic.      Mouth/Throat:      Mouth: Mucous membranes are moist.   Cardiovascular:      Rate and Rhythm:  Normal rate and regular rhythm.      Pulses: Normal pulses.      Heart sounds: Normal heart sounds.   Pulmonary:      Effort: Pulmonary effort is normal.      Breath sounds: Normal breath sounds.   Abdominal:      General: Bowel sounds are normal.      Palpations: Abdomen is soft.   Musculoskeletal:         General: Normal range of motion.      Right lower leg: No edema.      Left lower leg: Edema (+1) present.   Skin:     General: Skin is warm and dry.      Capillary Refill: Capillary refill takes less than 2 seconds.   Neurological:      Mental Status: He is alert and oriented to person, place, and time.         Pertinent Laboratory/Diagnostic Studies:   The following labs/studies were reviewed please see chart or hospital paperwork for details.    Component  Ref Range & Units 12/29/24 0612   Hemoglobin  12.5 - 17.0 g/dL 15.9   Hematocrit  37.0 - 48.0 % 45.3   WBC  4.0 - 10.5 thou/cmm 8.9   RBC  4.00 - 5.40 mill/cmm 4.87   Platelet  140 - 350 thou/cmm 156   MPV  7.5 - 11.3 fL 7.8   MCV  80 - 100 fL 93   MCH  27.0 - 36.0 pg 32.6   MCHC  32.0 - 37.0 g/dL 35.0   RDW  12.0 - 16.0 % 14.0   Differential Type AUTO   Absolute Neutrophils  1.8 - 7.8 thou/cmm 6.3   Absolute Lymphocytes  1.0 - 3.0 thou/cmm 1.6   Absolute Monocytes  0.3 - 1.0 thou/cmm 1.0   Absolute Eosinophils  0.0 - 0.5 thou/cmm 0.0   Absolute Basophils  0.0 - 0.1 thou/cmm 0.1   Neutrophils  % 70   Lymphocytes Manual  % 18   Monocytes  % 11   Eosinophils  % 0   Basophils  % 1                 Component  Ref Range & Units (hover) 12/29/24 0612 11/12/24 0712 7/8/24 0703 2/22/24 0818 12/21/23 1307 8/30/23 0818 8/17/23 0705   Glucose 163 High  147 High  CM  245 High   High  CM  157 High  CM   BUN 16 22 R 18 R 23 R 16 R 18 R 18 R   Creatinine 1.20 1.33 High  R 1.07 R, CM 1.10 R 1.05 R 1.20 R, CM 1.14 R   Sodium 137 141 R 146 R 142 R 145 R 141 R 141 R   Potassium 4.7 3.6 R 4.4 R 4.4 R 4.0 R 3.9 R 4.2 R   Chloride 102 101 R 106 R 102 R 103 R 106 R 104 R    Carbon Dioxide 29 30 R 30 R 31 R 32 R 29 R 31 R   Calcium 10.0 10.1 R 10.1 R 10.2 R 10.4 High  R 10.2 R 9.5 R   Alkaline Phosphatase 50 51 R 54 R 60 R  55 R 59 R   ALBUMIN 4.2 4.4 R 4.0 R 4.3 R  4.2 R 4.1 R   Total Bilirubin 1.1 High  0.8 R 0.74 R, CM 1.0 R  0.87 R, CM 0.6 R   Comment: Eltrombopag and its metabolites may interfere with this assay causing erroneously high patient results.   Protein, Total 7.1 6.8 R 6.9 R 6.7 R  7.2 R 6.4 R   AST 36 20 R 23 R 32 R  46 High  R 25 R   ALT 20 23 R 26 R, CM 39 R  52 R, CM 33 R   ANION GAP 6  10 R   6 R    eGFRcr 65 58 Low  R 69 R 73 R 77 R 61 R 70 R   eGFR Comment Interpretive information: calculated GFR           - Counseling Documentation: patient was counseled regarding: risks and benefits of treatment options

## 2025-01-08 ENCOUNTER — NURSING HOME VISIT (OUTPATIENT)
Dept: GERIATRICS | Facility: OTHER | Age: 70
End: 2025-01-08
Payer: COMMERCIAL

## 2025-01-08 VITALS
TEMPERATURE: 97.4 F | RESPIRATION RATE: 18 BRPM | BODY MASS INDEX: 35.4 KG/M2 | WEIGHT: 253.8 LBS | DIASTOLIC BLOOD PRESSURE: 68 MMHG | OXYGEN SATURATION: 96 % | HEART RATE: 70 BPM | SYSTOLIC BLOOD PRESSURE: 134 MMHG

## 2025-01-08 DIAGNOSIS — R60.0 LOCALIZED EDEMA: ICD-10-CM

## 2025-01-08 DIAGNOSIS — J44.9 CHRONIC OBSTRUCTIVE PULMONARY DISEASE, UNSPECIFIED COPD TYPE (HCC): Primary | ICD-10-CM

## 2025-01-08 DIAGNOSIS — E11.42 TYPE 2 DIABETES MELLITUS WITH DIABETIC POLYNEUROPATHY, UNSPECIFIED WHETHER LONG TERM INSULIN USE (HCC): ICD-10-CM

## 2025-01-08 DIAGNOSIS — C61 PROSTATE CANCER (HCC): ICD-10-CM

## 2025-01-08 DIAGNOSIS — R53.1 WEAKNESS: ICD-10-CM

## 2025-01-08 DIAGNOSIS — I73.9 PVD (PERIPHERAL VASCULAR DISEASE) (HCC): ICD-10-CM

## 2025-01-08 DIAGNOSIS — D68.59 HYPERCOAGULABLE STATE (HCC): ICD-10-CM

## 2025-01-08 DIAGNOSIS — I10 ESSENTIAL HYPERTENSION: ICD-10-CM

## 2025-01-08 PROCEDURE — 99309 SBSQ NF CARE MODERATE MDM 30: CPT | Performed by: NURSE PRACTITIONER

## 2025-01-08 RX ORDER — DOCUSATE SODIUM 100 MG/1
200 CAPSULE, LIQUID FILLED ORAL
COMMUNITY

## 2025-01-08 RX ORDER — GUAIFENESIN 600 MG/1
600 TABLET, EXTENDED RELEASE ORAL EVERY 12 HOURS SCHEDULED
Start: 2025-01-08

## 2025-01-08 NOTE — PROGRESS NOTES
[facility]  [address] [facility] [address]  [phone]  FACILITY: [facility]  Code 31 (STR)        NAME: Yevgeniy Beckford  AGE: 69 y.o. SEX: male CODE STATUS: No CPR    DATE OF ENCOUNTER: 1/8/2025    Assessment and Plan     1. Chronic obstructive pulmonary disease, unspecified COPD type (HCC)  Assessment & Plan:  On chronic O2 3-4 L NC at home   Does not appear in exacerbation on exam   He does have moist cough on exam today, lungs are diminished  Start Mucinex 600mg BID x 10 days     Continue Trelegy Ellipta daily ( patient states he will often forget to take this at home but relies more on rescue inhaler)  Continue Albuterol HFA Q 6 PRN for wheezing/sob  OP F/u with Pulmonology   Orders:  -     guaiFENesin (MUCINEX) 600 mg 12 hr tablet; Take 1 tablet (600 mg total) by mouth every 12 (twelve) hours  2. Weakness  Assessment & Plan:  In setting of known Lumbar DJD with radiculopathy   Last MRI in 2021 of L spine noted stenosis of foraminal area at L2-L3 on left and moderate right neural foraminal stenosis at L5-S1  Presented to ED with weakness and fatigue but workup was essentially unremarkable   Continue PT/OT   following for d/c planning  3. Localized edema  Assessment & Plan:  Patient noted with hx of leg edema and has order for Bumetanide 1 mg daily for this  There is no edema noted on exam today   Patient is requesting to stop this due to urinary frequency, patient states his Pulmonologist initally ordered this for leg edema  Plan  D/C Bumetanide and monitor for edema,sob or weight gain   Monitor weight weekly at rehab   4. Type 2 diabetes mellitus with diabetic polyneuropathy, unspecified whether long term insulin use (HCC)  Assessment & Plan:    Lab Results   Component Value Date    HGBA1C 7.8 (H) 11/12/2024     Per geriatric guidelines, goal HgA1c is > 7.5 to prevent hypoglycemic event in the older adult with cognitive decline    Appears controlled based off last  A1c  Continue Glipizide 5 mg daily   Continue Lipitor 40 mg daily   Accu Checks Daily   Hypoglycemic Protocol   5. Essential hypertension  Assessment & Plan:  Bp acceptable   Continue metoprolol succinate 50 mg daily   Monitor vitals at rehab   6. PVD (peripheral vascular disease) (HCC)  Assessment & Plan:  Noted in history  Continue Pregabalin 150 mg daily for neuropathy   7. Prostate cancer (HCC)  Assessment & Plan:  Noted in history s/p radiation  Continue Proscar 5 mg daily       8. Hypercoagulable state (HCC)  Assessment & Plan:  Stable  Continue Xarelto        All medications and routine orders were reviewed and updated as needed.    Chief Complaint     STR follow up visit    Past Medical and Surgica History      Past Medical History:   Diagnosis Date    Allergic 1968    Colon polyp     COPD (chronic obstructive pulmonary disease) (HCC)     Elevated PSA     Erectile dysfunction     H/O colonoscopy 08/22/2011    DESC 8/22/2011    Hyperlipidemia     Hypertension     Lower urinary tract symptoms (LUTS)     Obesity     Pneumonia 11/25/2018    Prostate cancer (HCC)     Pulmonary emboli (HCC)     on Xarelto    Shortness of breath     Sleep apnea     Synovitis and tenosynovitis     Visual impairment      Past Surgical History:   Procedure Laterality Date    BACK SURGERY      COLONOSCOPY      EYE SURGERY      FRACTURE SURGERY  05/31/01970    HERNIA REPAIR      KNEE SURGERY Right     LUMBAR LAMINECTOMY      OR BX PROSTATE STRTCTC SATURATION SAMPLING IMG GID N/A 06/13/2023    Procedure: TRANSPERINEAL MRI FUSION BIOPSY PROSTATE;  Surgeon: Ana Escalante MD;  [facility];  Service: Urology    OR PLMT INTERSTITIAL DEV RADIAT TX PROSTATE 1/MULT N/A 09/12/2023    Procedure: INSERTION OF FIDUCIAL MARKER ,SPACEOAR;  Surgeon: Yoni Andrews MD;  Location: [facility];  Service: Urology    SPINE SURGERY       Allergies   Allergen Reactions    Penicillins Hives          History of Present Illness     Yevgeniy Beckford is a 69  y.o. male admitted to [facility] following hospital stay for weakness. Patient has a past medical Hx including but not limited to HTN,PVD,COPD on chronic 3-4L NC, DM2, CKD3, Prostate Ca, HLD, Hypercoagulable state on Xarelto. Patient is seen in collaboration with nursing for medical mgmt and STR follow up.     Patient initially presented to hospital after being stuck in his recliner for about 12 hours, patient sleeps in his recliner however this time it tipped back so far that he was unable to get himself out until his friend helped him out. He does live at home alone. He presented to ED with weakness and fatigue and was recommended to discharge to post acute rehab.      Seen and examined at bedside today. Patient is a reliable historian. He is resting in bed, does not appear in distress. He is AAOx3. He states he has a cough with mucus, agreeable to trial mucinex. He denies fever/chills, denies CP/SOB. He states he does not have any edema to his legs and is requesting to stop water pill. He states therapy is going well, still feeling weak, he contributes this to his chronic back pain, states Diazepam and oxycodone help. He states he does have some constipation and take colace at home with relief. Otherwise he offers no other complaints/concerns at this time.           The patient's allergies, past medical, surgical, social and family history were reviewed and unchanged.    Review of Systems     Review of Systems   Constitutional:  Positive for activity change.   Respiratory:  Positive for cough.    Musculoskeletal:  Positive for back pain and gait problem.   Neurological:  Positive for weakness.   All other systems reviewed and are negative.        Objective     Vitals:   Vitals:    01/08/25 1229   BP: 134/68   Pulse: 70   Resp: 18   Temp: (!) 97.4 °F (36.3 °C)   SpO2: 96%         Physical Exam  Vitals and nursing note reviewed.   Constitutional:       General: He is not in acute distress.      Appearance: Normal appearance. He is obese.      Comments: Wearing 3L NC    HENT:      Head: Normocephalic and atraumatic.      Nose: No congestion or rhinorrhea.      Mouth/Throat:      Mouth: Mucous membranes are moist.   Eyes:      General: No scleral icterus.     Extraocular Movements: Extraocular movements intact.      Conjunctiva/sclera: Conjunctivae normal.      Pupils: Pupils are equal, round, and reactive to light.   Cardiovascular:      Rate and Rhythm: Normal rate and regular rhythm.      Pulses: Normal pulses.      Heart sounds: Normal heart sounds. No murmur heard.  Pulmonary:      Effort: Pulmonary effort is normal.      Breath sounds: Decreased breath sounds present. No wheezing, rhonchi or rales.   Abdominal:      General: Bowel sounds are normal. There is no distension.      Palpations: Abdomen is soft.      Tenderness: There is no abdominal tenderness.   Musculoskeletal:         General: No swelling or signs of injury.   Lymphadenopathy:      Cervical: No cervical adenopathy.   Skin:     General: Skin is warm and dry.      Findings: No erythema.   Neurological:      Mental Status: He is alert and oriented to person, place, and time.      Sensory: No sensory deficit.      Motor: Weakness present.      Gait: Gait abnormal.   Psychiatric:         Mood and Affect: Mood normal.         Behavior: Behavior normal.         Pertinent Laboratory/Diagnostic Studies:     Reviewed in facility chart      Current Medications   Medications reviewed and updated see facility MAR for details.      Current Outpatient Medications:     docusate sodium (COLACE) 100 mg capsule, Take 200 mg by mouth daily at bedtime, Disp: , Rfl:     guaiFENesin (MUCINEX) 600 mg 12 hr tablet, Take 1 tablet (600 mg total) by mouth every 12 (twelve) hours, Disp: , Rfl:     albuterol (PROVENTIL HFA,VENTOLIN HFA) 90 mcg/act inhaler, Inhale 2 puffs every 4 (four) hours as needed, Disp: , Rfl:     allopurinol (ZYLOPRIM) 100 mg tablet, TAKE ONE  "TABLET BY MOUTH DAILY, Disp: 100 tablet, Rfl: 1    atorvastatin (LIPITOR) 40 mg tablet, TAKE 1 TABLET (40 MG TOTAL) BY MOUTH DAILY, Disp: 90 tablet, Rfl: 3    bumetanide (BUMEX) 0.5 MG tablet, Take 1 mg by mouth daily (Patient not taking: Reported on 1/8/2025), Disp: , Rfl:     diazepam (VALIUM) 5 mg tablet, TAKE 1 TABLET (5 MG TOTAL) BY MOUTH 2 (TWO) TIMES A DAY (Patient taking differently: Take 5 mg by mouth every 8 (eight) hours as needed for muscle spasms), Disp: 180 tablet, Rfl: 1    fenofibrate micronized (LOFIBRA) 134 MG capsule, TAKE 1 CAPSULE (134 MG TOTAL) BY MOUTH DAILY, Disp: 90 capsule, Rfl: 3    glipiZIDE (GLUCOTROL) 5 mg tablet, Take 1 tablet (5 mg total) by mouth daily with breakfast, Disp: 90 tablet, Rfl: 5    metoprolol succinate (TOPROL-XL) 50 mg 24 hr tablet, TAKE 1 TABLET (50 MG TOTAL) BY MOUTH DAILY, Disp: 90 tablet, Rfl: 1    Multiple Vitamin (DAILY VALUE MULTIVITAMIN) TABS, Take 1 tablet by mouth daily, Disp: , Rfl:     pregabalin (LYRICA) 300 MG capsule, TAKE 1 CAPSULE (300 MG TOTAL) BY MOUTH 2 (TWO) TIMES A DAY (Patient taking differently: Take 150 mg by mouth daily), Disp: 180 capsule, Rfl: 1    tadalafil (CIALIS) 20 MG tablet, Take 1 tablet (20 mg total) by mouth daily as needed for erectile dysfunction (Patient not taking: Reported on 1/8/2025), Disp: 10 tablet, Rfl: 10    Trelegy Ellipta 100-62.5-25 MCG/INH inhaler, Inhale 1 puff daily, Disp: , Rfl:     Xarelto 20 MG tablet, TAKE ONE TABLET BY MOUTH DAILY, Disp: 90 tablet, Rfl: 3     Please note:  Voice-recognition software may have been used in the preparation of this document.  Occasional wrong word or \"sound-alike\" substitutions may have occurred due to the inherent limitations of voice recognition software.  Interpretation should be guided by context.         TAYLER Seymour  1/8/2025  4:55 PM    "

## 2025-01-08 NOTE — ASSESSMENT & PLAN NOTE
On chronic O2 3-4 L NC at home   Does not appear in exacerbation on exam   He does have moist cough on exam today, lungs are diminished  Start Mucinex 600mg BID x 10 days     Continue Trelegy Ellipta daily ( patient states he will often forget to take this at home but relies more on rescue inhaler)  Continue Albuterol HFA Q 6 PRN for wheezing/sob  OP F/u with Pulmonology

## 2025-01-08 NOTE — PROGRESS NOTES
Nursing Home Visit - Pulmonary   Yevgeniy Beckford 69 y.o. male MRN: [id number]  Unit/Bed#:  Encounter: [id number]    Assessment:  Chronic hypoxic respiratory failure  COPD without acute exacerbation  NAS on nocturnal noninvasive ventilation  Hypercoagulability state on chronic Xarelto    Plan:  Patient to maintain pulse ox more than 88%  Continue Trelegy and as needed albuterol  Continue noninvasive ventilation at night after discharge   Continue Xarelto  Follow with pulmonologist at [facility] Dr. Osman John after discharge    ----------------------------------------------------------------------------------------------------------------------    HPI/Interval History:   69-year-old male with COPD, chronic hypoxic respiratory failure on 3-4 L/min, NAS, and hypercoagulability state on Xarelto among other comorbidities.  He had recent hospitalization at [facility] due to fatigue and ambulatory dysfunction and was sent to rehab after that.  He follows with Dr. John at [facility] pulmonary, he is on Trelegy.  Also is on noninvasive ventilation at night and feels fine.  Patient feels at baseline, he is on oxygen at baseline as well.  He denies significant shortness of breath, denies cough or sputum production, denies fever chills or night sweats, no chest pain.    Vitals:   Reviewed vital signs in chart at nursing facility, stable    Physical Exam:   Gen: Alert and without respiratory distress  HEENT: PERRL. O/P: clear. no erythema or exudate.  Neck: Supple. There is no JVD, no LAD or thyromegaly appreciated. Trachea is midline.  Chest: Equal breath sounds, mildly diminished, no wheezing or crackles with good air movement  Cardiac: S1-S2 regular, no murmurs  Abdomen: Soft and nontender. Bowel sounds are present.  Extremities: No edema  Neuro:  Awake alert and oriented, no focal deficits      Pertinent studies:  Chest x-ray from [facility]:  Impression: Cardiomediastinal silhouette is unremarkable without  "cardiomegaly.   Lung volumes are low and both hilum and central lung markings are prominent   likely due to inadequate inspiration. No discrete area of airspace infiltrate or   significant pleural effusion. No pneumothorax.       PFT 2023 LV[facility]:  Results:   The FEV1 is severly reduced .   The FVC is  reduced .   The FEV1/FVC is reduced.     There is a significant bronchodilator response.     The TLC is  reduced .     The DLCO is severly reduced .     Impression:   The patient has evidence of very severe obstruction .and possibly   concomitant restriction There is a significant bronchodilator response.    The diffusion capacity is severly reduced .               Kim Stokes MD    Portions of the record may have been created with voice recognition software. Occasional wrong word or \"sound a like\" substitutions may have occurred due to the inherent limitations of voice recognition software. Read the chart carefully and recognize, using context, where substitutions have occurred.  "

## 2025-01-08 NOTE — ASSESSMENT & PLAN NOTE
Lab Results   Component Value Date    HGBA1C 7.8 (H) 11/12/2024     Per geriatric guidelines, goal HgA1c is > 7.5 to prevent hypoglycemic event in the older adult with cognitive decline    Appears controlled based off last A1c  Continue Glipizide 5 mg daily   Continue Lipitor 40 mg daily   Accu Checks Daily   Hypoglycemic Protocol

## 2025-01-08 NOTE — ASSESSMENT & PLAN NOTE
Patient noted with hx of leg edema and has order for Bumetanide 1 mg daily for this  There is no edema noted on exam today   Patient is requesting to stop this due to urinary frequency, patient states his Pulmonologist initally ordered this for leg edema  Plan  D/C Bumetanide and monitor for edema,sob or weight gain   Monitor weight weekly at rehab

## 2025-01-08 NOTE — ASSESSMENT & PLAN NOTE
In setting of known Lumbar DJD with radiculopathy   Last MRI in 2021 of L spine noted stenosis of foraminal area at L2-L3 on left and moderate right neural foraminal stenosis at L5-S1  Presented to ED with weakness and fatigue but workup was essentially unremarkable   Continue PT/OT   following for d/c planning

## 2025-01-09 ENCOUNTER — NURSING HOME VISIT (OUTPATIENT)
Dept: PULMONOLOGY | Facility: CLINIC | Age: 70
End: 2025-01-09
Payer: COMMERCIAL

## 2025-01-09 DIAGNOSIS — J44.9 CHRONIC OBSTRUCTIVE PULMONARY DISEASE, UNSPECIFIED COPD TYPE (HCC): ICD-10-CM

## 2025-01-09 DIAGNOSIS — J96.11 CHRONIC RESPIRATORY FAILURE WITH HYPOXIA AND HYPERCAPNIA (HCC): ICD-10-CM

## 2025-01-09 DIAGNOSIS — G47.33 OSA (OBSTRUCTIVE SLEEP APNEA): Primary | ICD-10-CM

## 2025-01-09 DIAGNOSIS — J96.12 CHRONIC RESPIRATORY FAILURE WITH HYPOXIA AND HYPERCAPNIA (HCC): ICD-10-CM

## 2025-01-09 PROCEDURE — 99305 1ST NF CARE MODERATE MDM 35: CPT | Performed by: INTERNAL MEDICINE

## 2025-01-13 ENCOUNTER — NURSING HOME VISIT (OUTPATIENT)
Dept: GERIATRICS | Facility: OTHER | Age: 70
End: 2025-01-13
Payer: COMMERCIAL

## 2025-01-13 VITALS
OXYGEN SATURATION: 95 % | TEMPERATURE: 98 F | WEIGHT: 248.8 LBS | RESPIRATION RATE: 16 BRPM | SYSTOLIC BLOOD PRESSURE: 149 MMHG | BODY MASS INDEX: 34.7 KG/M2 | HEART RATE: 69 BPM | DIASTOLIC BLOOD PRESSURE: 61 MMHG

## 2025-01-13 DIAGNOSIS — I73.9 PVD (PERIPHERAL VASCULAR DISEASE) (HCC): ICD-10-CM

## 2025-01-13 DIAGNOSIS — E11.42 TYPE 2 DIABETES MELLITUS WITH DIABETIC POLYNEUROPATHY, UNSPECIFIED WHETHER LONG TERM INSULIN USE (HCC): ICD-10-CM

## 2025-01-13 DIAGNOSIS — R53.1 WEAKNESS: ICD-10-CM

## 2025-01-13 DIAGNOSIS — D68.59 HYPERCOAGULABLE STATE (HCC): ICD-10-CM

## 2025-01-13 DIAGNOSIS — I10 ESSENTIAL HYPERTENSION: ICD-10-CM

## 2025-01-13 DIAGNOSIS — C61 PROSTATE CANCER (HCC): ICD-10-CM

## 2025-01-13 DIAGNOSIS — J44.9 CHRONIC OBSTRUCTIVE PULMONARY DISEASE, UNSPECIFIED COPD TYPE (HCC): Primary | ICD-10-CM

## 2025-01-13 PROCEDURE — 99316 NF DSCHRG MGMT 30 MIN+: CPT | Performed by: NURSE PRACTITIONER

## 2025-01-13 NOTE — PROGRESS NOTES
[facility]  [address] [address]  [phone]  DISCHARGE SUMMARY  FACILITY: [facility]  Code 31    NAME: Yevgeniy Beckford  AGE: 69 y.o. SEX: male   CODE STATUS: No CPR    DATE OF ADMISSION: 1/3/25   DATE OF DISCHARGE: 1/14/25  DISCHARGE DISPOSITION: Stable for discharge to home with family support and home health PT/OT/SN services.       Reason for Admission: Patient was admitted to [facility] for rehabilitation after hospitalization for Generalized weakness.    Past Medical and Surgical History:   Past Medical History:   Diagnosis Date    Allergic 1968    Colon polyp     COPD (chronic obstructive pulmonary disease) (HCC)     Elevated PSA     Erectile dysfunction     H/O colonoscopy 08/22/2011    DESC 8/22/2011    Hyperlipidemia     Hypertension     Lower urinary tract symptoms (LUTS)     Obesity     Pneumonia 11/25/2018    Prostate cancer (HCC)     Pulmonary emboli (HCC)     on Xarelto    Shortness of breath     Sleep apnea     Synovitis and tenosynovitis     Visual impairment       Past Surgical History:   Procedure Laterality Date    BACK SURGERY      COLONOSCOPY      EYE SURGERY      FRACTURE SURGERY  05/31/01970    HERNIA REPAIR      KNEE SURGERY Right     LUMBAR LAMINECTOMY      NE BX PROSTATE STRTCTC SATURATION SAMPLING IMG GID N/A 06/13/2023    Procedure: TRANSPERINEAL MRI FUSION BIOPSY PROSTATE;  Surgeon: Ana Escalante MD;  [facility];  Service: Urology    NE PLMT INTERSTITIAL DEV RADIAT TX PROSTATE 1/MULT N/A 09/12/2023    Procedure: INSERTION OF FIDUCIAL MARKER ,SPACEOAR;  Surgeon: Yoni Andrews MD;  [facility];  Service: Urology    SPINE SURGERY         Course of stay:   Yevgeniy Beckford is a 69 y.o. male admitted to [facility] following hospital stay for weakness. Patient has a past medical Hx including but not limited to HTN,PVD,COPD on chronic 3-4L NC, DM2, CKD3, Prostate Ca, HLD, Hypercoagulable state on  "Xarelto. Patient is seen in collaboration with nursing for medical mgmt and Discharge visit.     Patient initially presented to hospital after being stuck in his recliner for about 12 hours, patient sleeps in his recliner however this time it tipped back so far that he was unable to get himself out until his friend helped him out. He does live at home alone. He presented to ED with weakness and fatigue and was recommended to discharge to post acute rehab.      Seen and examined at bedside today. Patient is a reliable historian. He is resting in bed, does not appear in distress. He is wearing oxygen. He is AAOx3. He states his cough and mucus production has improved with mucinex. He states he feels ready for discharge to home. He has no questions or concerns on exam today. He states he does not want any home health PT/OT, he states, \"it's a waste of time.\" He was recommended to follow up with his PCP upon discharge from rehab. He denies fever/chills, denies CP/SOB. He requested to stop his diuretic upon admission to rehab due to no edema and stating his breathing was at baseline, no rhonchi or rales noted on serial exams during rehab, his diuretic remains on hold. Again he was encouraged to f/u with his PCP and Pulmonologist.      ROS:  Review of Systems   All other systems reviewed and are negative.    PHYSICAL EXAM:  VITALS:   Vitals:    01/13/25 1018   BP: 149/61   Pulse: 69   Resp: 16   Temp: 98 °F (36.7 °C)   SpO2: 95%        Physical Exam  Vitals and nursing note reviewed.   Constitutional:       General: He is not in acute distress.     Appearance: Normal appearance. He is obese.      Comments: Wearing 3L NC    HENT:      Head: Normocephalic and atraumatic.      Nose: No congestion or rhinorrhea.      Mouth/Throat:      Mouth: Mucous membranes are moist.   Eyes:      General: No scleral icterus.     Extraocular Movements: Extraocular movements intact.      Conjunctiva/sclera: Conjunctivae normal.      Pupils: " Pupils are equal, round, and reactive to light.   Cardiovascular:      Rate and Rhythm: Normal rate and regular rhythm.      Pulses: Normal pulses.      Heart sounds: Normal heart sounds. No murmur heard.  Pulmonary:      Effort: Pulmonary effort is normal.      Breath sounds: Decreased breath sounds present. No wheezing, rhonchi or rales.   Abdominal:      General: Bowel sounds are normal. There is no distension.      Palpations: Abdomen is soft.      Tenderness: There is no abdominal tenderness.   Musculoskeletal:         General: No swelling or signs of injury.   Lymphadenopathy:      Cervical: No cervical adenopathy.   Skin:     General: Skin is warm and dry.      Findings: No erythema.   Neurological:      Mental Status: He is alert and oriented to person, place, and time.      Sensory: No sensory deficit.      Motor: Weakness present.      Gait: Gait abnormal.   Psychiatric:         Mood and Affect: Mood normal.         Behavior: Behavior normal.     Admission Diagnoses:   1. Chronic obstructive pulmonary disease, unspecified COPD type (HCC)  Assessment & Plan:  On chronic O2 3-4 L NC at home   [facility]  Does not appear in exacerbation on exam   Continue Trelegy Ellipta daily ( patient states he will often forget to take this at home but relies more on rescue inhaler)  Continue Albuterol HFA Q 6 PRN for wheezing/sob  OP F/u with Pulmonology   2. Type 2 diabetes mellitus with diabetic polyneuropathy, unspecified whether long term insulin use ([facility])  Assessment & Plan:    Lab Results   Component Value Date    HGBA1C 7.8 (H) 11/12/2024     Per geriatric guidelines, goal HgA1c is > 7.5 to prevent hypoglycemic event in the older adult with cognitive decline    Appears controlled based off last A1c  Continue Glipizide 5 mg daily   Continue Lipitor 40 mg daily   Accu Checks Daily   Hypoglycemic Protocol   3. Prostate cancer (HCC)  Assessment & Plan:  Noted in history s/p radiation  Previously on  Flomax but stopped, it appears he was prescribed Proscar but he has not been taking this at home, he is not sure why  Recommend f/u with Urology and PCP    4. Hypercoagulable state (HCC)  Assessment & Plan:  Stable  Continue Xarelto   5. Weakness  Assessment & Plan:  In setting of known Lumbar DJD with radiculopathy   Last MRI in 2021 of L spine noted stenosis of foraminal area at L2-L3 on left and moderate right neural foraminal stenosis at L5-S1  Presented to ED with weakness and fatigue but workup was essentially unremarkable   Continue PT/OT   following for d/c planning  6. PVD (peripheral vascular disease) ([facility])  Assessment & Plan:  Noted in history  Continue Pregabalin 150 mg BID for neuropathy   7. Essential hypertension  Assessment & Plan:  Bp acceptable   Continue metoprolol succinate 50 mg daily   Monitor vitals at rehab        Follow-up Recommendations:    Outpatient Follow up with PCP in the next 2 weeks  Home Health PT/OT/SN services     Labs and testing performed during stay:    Reviewed in chart    Discharge Medications: See discharge medication list which was reviewed and signed.      Current Outpatient Medications:     albuterol (PROVENTIL HFA,VENTOLIN HFA) 90 mcg/act inhaler, Inhale 2 puffs every 4 (four) hours as needed, Disp: , Rfl:     allopurinol (ZYLOPRIM) 100 mg tablet, TAKE ONE TABLET BY MOUTH DAILY, Disp: 100 tablet, Rfl: 1    atorvastatin (LIPITOR) 40 mg tablet, TAKE 1 TABLET (40 MG TOTAL) BY MOUTH DAILY, Disp: 90 tablet, Rfl: 3    bumetanide (BUMEX) 0.5 MG tablet, Take 1 mg by mouth daily (Patient not taking: Reported on 1/14/2025), Disp: , Rfl:     diazepam (VALIUM) 5 mg tablet, TAKE 1 TABLET (5 MG TOTAL) BY MOUTH 2 (TWO) TIMES A DAY, Disp: 180 tablet, Rfl: 1    docusate sodium (COLACE) 100 mg capsule, Take 200 mg by mouth daily at bedtime, Disp: , Rfl:     fenofibrate micronized (LOFIBRA) 134 MG capsule, TAKE 1 CAPSULE (134 MG TOTAL) BY MOUTH DAILY, Disp: 90 capsule, Rfl:  "3    glipiZIDE (GLUCOTROL) 5 mg tablet, Take 1 tablet (5 mg total) by mouth daily with breakfast, Disp: 90 tablet, Rfl: 5    guaiFENesin (MUCINEX) 600 mg 12 hr tablet, Take 1 tablet (600 mg total) by mouth every 12 (twelve) hours, Disp: , Rfl:     metoprolol succinate (TOPROL-XL) 50 mg 24 hr tablet, TAKE 1 TABLET (50 MG TOTAL) BY MOUTH DAILY, Disp: 90 tablet, Rfl: 1    Multiple Vitamin (DAILY VALUE MULTIVITAMIN) TABS, Take 1 tablet by mouth daily, Disp: , Rfl:     pregabalin (LYRICA) 300 MG capsule, TAKE 1 CAPSULE (300 MG TOTAL) BY MOUTH 2 (TWO) TIMES A DAY (Patient taking differently: Take 150 mg by mouth 2 (two) times a day), Disp: 180 capsule, Rfl: 1    tadalafil (CIALIS) 20 MG tablet, Take 1 tablet (20 mg total) by mouth daily as needed for erectile dysfunction (Patient not taking: Reported on 1/8/2025), Disp: 10 tablet, Rfl: 10    Trelegy Ellipta 100-62.5-25 MCG/INH inhaler, Inhale 1 puff daily, Disp: , Rfl:     Xarelto 20 MG tablet, TAKE ONE TABLET BY MOUTH DAILY, Disp: 90 tablet, Rfl: 3     Discussion with patient/family and further instructions:  -Fall precautions  -Aspiration precautions  -Bleeding precautions  -Monitor for signs/symptoms of infection  -Medication list was reviewed and signed  -DME form was completed    Status at time of discharge: Stable      Billing based on time. Time spent on unit, 40 minutes. Time spent counseling pt on debility/condition, 30 minutes.      Please note:  Voice-recognition software may have been used in the preparation of this document.  Occasional wrong word or \"sound-alike\" substitutions may have occurred due to the inherent limitations of voice recognition software.  Interpretation should be guided by context.        TAYLER Seymour  1/13/2025   "

## 2025-01-13 NOTE — ASSESSMENT & PLAN NOTE
On chronic O2 3-4 L NC at home   [facility]  Does not appear in exacerbation on exam   Continue Trelegy Ellipta daily ( patient states he will often forget to take this at home but relies more on rescue inhaler)  Continue Albuterol HFA Q 6 PRN for wheezing/sob  OP F/u with Pulmonology

## 2025-01-13 NOTE — ASSESSMENT & PLAN NOTE
Noted in history s/p radiation  Previously on Flomax but stopped, it appears he was prescribed Proscar but he has not been taking this at home, he is not sure why  Recommend f/u with Urology and PCP

## 2025-01-14 ENCOUNTER — RA CDI HCC (OUTPATIENT)
Dept: OTHER | Facility: HOSPITAL | Age: 70
End: 2025-01-14

## 2025-01-16 DIAGNOSIS — G89.4 CHRONIC PAIN SYNDROME: ICD-10-CM

## 2025-01-17 DIAGNOSIS — E78.2 MIXED HYPERLIPIDEMIA: ICD-10-CM

## 2025-01-17 DIAGNOSIS — E79.0 HYPERURICEMIA: ICD-10-CM

## 2025-01-17 RX ORDER — ALLOPURINOL 100 MG/1
100 TABLET ORAL DAILY
Qty: 100 TABLET | Refills: 1 | Status: SHIPPED | OUTPATIENT
Start: 2025-01-17

## 2025-01-17 RX ORDER — FENOFIBRATE 134 MG/1
134 CAPSULE ORAL DAILY
Qty: 90 CAPSULE | Refills: 1 | Status: SHIPPED | OUTPATIENT
Start: 2025-01-17

## 2025-01-17 NOTE — TELEPHONE ENCOUNTER
[id number] 01/04/2025 01/03/2025 diazePAM (Tablet) 27.0 9 5 MG NA [facility] Medicare 0 / 0 PA     1 [id number] 01/04/2025 01/03/2025 Pregabalin (Capsule) 14.0 14 150 MG NA J[facility]KEVINLETICIA [facility] Medicare 0 / 0 PA    2 [id number] 10/26/2024 10/03/2024 diazePAM (Tablet) 180.0 90 5 MG NA [facility]ANDY DUVAL [facility] Medicare 0 / 1 PA    2 [id number] 10/23/2024 07/29/2024 Pregabalin (Capsule) 180.0 90 300 MG NA [facility]ANDY DUVAL [facility] Medicare 1 / 1 PA    2 [id number] 07/29/2024 07/29/2024 Pregabalin (Capsule) 180.0 90 300 MG NA LANDY DUVAL [facility] Medicare 0 / 1 PA

## 2025-01-18 RX ORDER — PREGABALIN 150 MG/1
150 CAPSULE ORAL 2 TIMES DAILY
Qty: 180 CAPSULE | Refills: 1 | Status: SHIPPED | OUTPATIENT
Start: 2025-01-18

## 2025-01-22 ENCOUNTER — OFFICE VISIT (OUTPATIENT)
Dept: FAMILY MEDICINE CLINIC | Facility: CLINIC | Age: 70
End: 2025-01-22
Payer: COMMERCIAL

## 2025-01-22 ENCOUNTER — TRANSITIONAL CARE MANAGEMENT (OUTPATIENT)
Dept: FAMILY MEDICINE CLINIC | Facility: CLINIC | Age: 70
End: 2025-01-22

## 2025-01-22 VITALS
HEART RATE: 64 BPM | TEMPERATURE: 98.4 F | HEIGHT: 71 IN | WEIGHT: 260 LBS | BODY MASS INDEX: 36.4 KG/M2 | DIASTOLIC BLOOD PRESSURE: 64 MMHG | SYSTOLIC BLOOD PRESSURE: 112 MMHG | OXYGEN SATURATION: 96 %

## 2025-01-22 DIAGNOSIS — J44.9 CHRONIC OBSTRUCTIVE PULMONARY DISEASE, UNSPECIFIED COPD TYPE (HCC): ICD-10-CM

## 2025-01-22 DIAGNOSIS — E66.01 CLASS 2 SEVERE OBESITY DUE TO EXCESS CALORIES WITH SERIOUS COMORBIDITY AND BODY MASS INDEX (BMI) OF 36.0 TO 36.9 IN ADULT (HCC): ICD-10-CM

## 2025-01-22 DIAGNOSIS — I10 ESSENTIAL HYPERTENSION: ICD-10-CM

## 2025-01-22 DIAGNOSIS — N18.30 STAGE 3 CHRONIC KIDNEY DISEASE, UNSPECIFIED WHETHER STAGE 3A OR 3B CKD (HCC): ICD-10-CM

## 2025-01-22 DIAGNOSIS — J96.11 CHRONIC RESPIRATORY FAILURE WITH HYPOXIA AND HYPERCAPNIA (HCC): ICD-10-CM

## 2025-01-22 DIAGNOSIS — M62.81 GENERALIZED MUSCLE WEAKNESS: ICD-10-CM

## 2025-01-22 DIAGNOSIS — D68.59 HYPERCOAGULABLE STATE (HCC): ICD-10-CM

## 2025-01-22 DIAGNOSIS — Z09 HOSPITAL DISCHARGE FOLLOW-UP: Primary | ICD-10-CM

## 2025-01-22 DIAGNOSIS — E11.42 TYPE 2 DIABETES MELLITUS WITH DIABETIC POLYNEUROPATHY, UNSPECIFIED WHETHER LONG TERM INSULIN USE (HCC): ICD-10-CM

## 2025-01-22 DIAGNOSIS — I26.99 OTHER ACUTE PULMONARY EMBOLISM WITHOUT ACUTE COR PULMONALE (HCC): ICD-10-CM

## 2025-01-22 DIAGNOSIS — C61 PROSTATE CANCER (HCC): ICD-10-CM

## 2025-01-22 DIAGNOSIS — J96.12 CHRONIC RESPIRATORY FAILURE WITH HYPOXIA AND HYPERCAPNIA (HCC): ICD-10-CM

## 2025-01-22 DIAGNOSIS — E66.812 CLASS 2 SEVERE OBESITY DUE TO EXCESS CALORIES WITH SERIOUS COMORBIDITY AND BODY MASS INDEX (BMI) OF 36.0 TO 36.9 IN ADULT (HCC): ICD-10-CM

## 2025-01-22 PROCEDURE — 99495 TRANSJ CARE MGMT MOD F2F 14D: CPT | Performed by: STUDENT IN AN ORGANIZED HEALTH CARE EDUCATION/TRAINING PROGRAM

## 2025-01-22 RX ORDER — DAPAGLIFLOZIN 5 MG/1
5 TABLET, FILM COATED ORAL DAILY
Qty: 90 TABLET | Refills: 1 | Status: SHIPPED | OUTPATIENT
Start: 2025-01-22

## 2025-01-22 NOTE — PROGRESS NOTES
Transition of Care Visit  Name: Yevgeniy Beckford      : 1955      MRN: [id number]  Encounter Provider: Kentrell Anand MD  Encounter Date: 2025   Encounter department: [facility] [address]    Assessment & Plan  Hospital discharge follow-up         Generalized muscle weakness  Reports subjective improvement.       Other acute pulmonary embolism without acute cor pulmonale (HCC)  On xarelto       Chronic respiratory failure with hypoxia and hypercapnia (HCC)  2-3L NC baseline, on trelegy follows with pulm       Chronic obstructive pulmonary disease, unspecified COPD type ([facility])         Hypercoagulable state (HCC)  On NOAC       Prostate cancer ([facility])  Follows with urology       Class 2 severe obesity due to excess calories with serious comorbidity and body mass index (BMI) of 36.0 to 36.9 in adult ([facility])         Type 2 diabetes mellitus with diabetic polyneuropathy, unspecified whether long term insulin use (HCC)  On glipizide. Recommended farxiga  given the swelling and CKD stage 3 hx  Lab Results   Component Value Date    HGBA1C 7.8 (H) 2024       Orders:  •  dapagliflozin (Farxiga) 5 MG TABS; Take 1 tablet (5 mg total) by mouth in the morning    Stage 3 chronic kidney disease, unspecified whether stage 3a or 3b CKD ([facility])  Lab Results   Component Value Date    EGFR 65 2024    EGFR 58 (L) 2024    EGFR 69 2024    CREATININE 1.20 2024    CREATININE 1.33 (H) 2024    CREATININE 1.07 2024   GFR is stable, farxiga recommended. If swelling worsens, take Bumex and call our office       Essential hypertension  stable            History of Present Illness     Transitional Care Management Review:   Yevgeniy Beckford is a 69 y.o. male here for TCM follow up.     Was in a recliner that broke, was ont her floor some time, called ambulance and was hospitalized. Discharged to a short term facility ([facility]). Is now back  home. States he is completing all his ADL's at this time. Was told to have home pt but he declined, states iot makes him worse. Has a new recliner that is electric to help hiom get up. He also stopped taking the bumex. On 2L NC, reports baseline of 2 or 3L.     Reviewed glucose in the hospitalk, was elevaetd and placed on insulin. Only on gli[pizide now    Yevgeniy Beckford is a 69 y.o. male who presents to the ED complaining of fatigue. Pt reports he lives at home with end stage COPD on 3-4 liters chronically, and sleeps in a recliner. Pt reports yesterday, his recliner tipped back and he was unable to get upright and was stuck in it. Pt reports he remained there for 12 hours until his friend came to help him. Pt complains of generalized weakness. Pt denies head injury or fevers. No other complaints at this time.   During the TCM phone call patient stated:  TCM Call     Date and time call was made  1/22/2025 10:49 AM    Hospital care reviewed  Records reviewed    Patient was hospitialized at  [facility]    Date of Admission  12/29/24    Date of discharge  01/02/25    Diagnosis  weakness    Disposition  Home    Were the patients medications reviewed and updated  Yes    Current Symptoms  None      TCM Call     Post hospital issues  None    Should patient be enrolled in anticoag monitoring?  No    Scheduled for follow up?  Yes    Did you obtain your prescribed medications  Yes    Do you need help managing your prescriptions or medications  No    Is transportation to your appointment needed  No    I have advised the patient to call PCP with any new or worsening symptoms  Celia Chavarria    Living Arrangements  Alone    Support System  None    Are you recieving any outpatient services  No    Are you recieving home care services  No    Are you using any community resources  No    Current waiver services  No    Have you fallen in the last 12 months  No    Interperter language line needed  No    Counseling   "Patient    Counseling topics  Activities of daily living; Importance of RX compliance        HPI  Review of Systems   Constitutional:  Negative for chills, fatigue and fever.   HENT:  Negative for rhinorrhea and sore throat.    Eyes:  Negative for visual disturbance.   Respiratory:  Negative for cough and shortness of breath.    Cardiovascular:  Negative for chest pain and palpitations.   Gastrointestinal:  Negative for abdominal pain, constipation, diarrhea, nausea and vomiting.   Genitourinary:  Negative for difficulty urinating, dysuria and frequency.   Musculoskeletal:  Negative for arthralgias and myalgias.   Skin:  Negative for color change and rash.   Neurological:  Negative for weakness and headaches.     Objective   /64   Pulse 64   Temp 98.4 °F (36.9 °C)   Ht 5' 11\" (1.803 m)   Wt 118 kg (260 lb)   SpO2 96%   BMI 36.26 kg/m²     Physical Exam  Constitutional:       General: He is not in acute distress.     Appearance: Normal appearance. He is not ill-appearing.   HENT:      Head: Normocephalic and atraumatic.      Right Ear: Tympanic membrane, ear canal and external ear normal.      Left Ear: Tympanic membrane, ear canal and external ear normal.      Nose: Nose normal.      Mouth/Throat:      Mouth: Mucous membranes are moist.      Pharynx: Oropharynx is clear. No oropharyngeal exudate or posterior oropharyngeal erythema.   Eyes:      General: No scleral icterus.        Right eye: No discharge.         Left eye: No discharge.      Extraocular Movements: Extraocular movements intact.      Conjunctiva/sclera: Conjunctivae normal.      Pupils: Pupils are equal, round, and reactive to light.   Cardiovascular:      Rate and Rhythm: Normal rate and regular rhythm.      Pulses: Normal pulses.      Heart sounds: Normal heart sounds. No murmur heard.  Pulmonary:      Effort: Pulmonary effort is normal. No respiratory distress.      Breath sounds: Normal breath sounds.   Abdominal:      General: Bowel " sounds are normal.      Palpations: Abdomen is soft.      Tenderness: There is no abdominal tenderness.   Musculoskeletal:         General: Normal range of motion.      Cervical back: Normal range of motion and neck supple.   Lymphadenopathy:      Cervical: No cervical adenopathy.   Skin:     General: Skin is warm and dry.      Capillary Refill: Capillary refill takes less than 2 seconds.   Neurological:      General: No focal deficit present.      Mental Status: He is alert and oriented to person, place, and time. Mental status is at baseline.      Cranial Nerves: No cranial nerve deficit.   Psychiatric:         Mood and Affect: Mood normal.       Medications have been reviewed by provider in current encounter

## 2025-01-22 NOTE — ASSESSMENT & PLAN NOTE
On glipizide. Recommended farxiga  given the swelling and CKD stage 3 hx  Lab Results   Component Value Date    HGBA1C 7.8 (H) 11/12/2024       Orders:  •  dapagliflozin (Farxiga) 5 MG TABS; Take 1 tablet (5 mg total) by mouth in the morning

## 2025-01-22 NOTE — ASSESSMENT & PLAN NOTE
Lab Results   Component Value Date    EGFR 65 12/29/2024    EGFR 58 (L) 11/12/2024    EGFR 69 07/08/2024    CREATININE 1.20 12/29/2024    CREATININE 1.33 (H) 11/12/2024    CREATININE 1.07 07/08/2024   GFR is stable, farxiga recommended. If swelling worsens, take Bumex and call our office

## 2025-03-03 DIAGNOSIS — I82.409 DEEP VEIN THROMBOSIS (DVT) OF LOWER EXTREMITY, UNSPECIFIED CHRONICITY, UNSPECIFIED LATERALITY, UNSPECIFIED VEIN (HCC): ICD-10-CM

## 2025-03-04 RX ORDER — RIVAROXABAN 20 MG/1
20 TABLET, FILM COATED ORAL DAILY
Qty: 90 TABLET | Refills: 1 | Status: SHIPPED | OUTPATIENT
Start: 2025-03-04

## 2025-03-31 ENCOUNTER — RA CDI HCC (OUTPATIENT)
Dept: OTHER | Facility: HOSPITAL | Age: 70
End: 2025-03-31

## 2025-04-03 ENCOUNTER — APPOINTMENT (OUTPATIENT)
Age: 70
End: 2025-04-03
Payer: COMMERCIAL

## 2025-04-03 DIAGNOSIS — E78.5 HYPERLIPIDEMIA, UNSPECIFIED HYPERLIPIDEMIA TYPE: ICD-10-CM

## 2025-04-03 DIAGNOSIS — E11.42 TYPE 2 DIABETES MELLITUS WITH DIABETIC POLYNEUROPATHY, UNSPECIFIED WHETHER LONG TERM INSULIN USE (HCC): ICD-10-CM

## 2025-04-03 DIAGNOSIS — E79.0 HYPERURICEMIA: ICD-10-CM

## 2025-04-03 LAB
ALBUMIN SERPL BCG-MCNC: 4.4 G/DL (ref 3.5–5)
ALP SERPL-CCNC: 54 U/L (ref 34–104)
ALT SERPL W P-5'-P-CCNC: 16 U/L (ref 7–52)
ANION GAP SERPL CALCULATED.3IONS-SCNC: 8 MMOL/L (ref 4–13)
AST SERPL W P-5'-P-CCNC: 19 U/L (ref 13–39)
BASOPHILS # BLD AUTO: 0.05 THOUSANDS/ÂΜL (ref 0–0.1)
BASOPHILS NFR BLD AUTO: 1 % (ref 0–1)
BILIRUB SERPL-MCNC: 0.69 MG/DL (ref 0.2–1)
BUN SERPL-MCNC: 20 MG/DL (ref 5–25)
CALCIUM SERPL-MCNC: 10.2 MG/DL (ref 8.4–10.2)
CHLORIDE SERPL-SCNC: 103 MMOL/L (ref 96–108)
CO2 SERPL-SCNC: 30 MMOL/L (ref 21–32)
CREAT SERPL-MCNC: 1.23 MG/DL (ref 0.6–1.3)
EOSINOPHIL # BLD AUTO: 0.12 THOUSAND/ÂΜL (ref 0–0.61)
EOSINOPHIL NFR BLD AUTO: 2 % (ref 0–6)
ERYTHROCYTE [DISTWIDTH] IN BLOOD BY AUTOMATED COUNT: 13.4 % (ref 11.6–15.1)
EST. AVERAGE GLUCOSE BLD GHB EST-MCNC: 166 MG/DL
GFR SERPL CREATININE-BSD FRML MDRD: 58 ML/MIN/1.73SQ M
GLUCOSE P FAST SERPL-MCNC: 169 MG/DL (ref 65–99)
HBA1C MFR BLD: 7.4 %
HCT VFR BLD AUTO: 49 % (ref 36.5–49.3)
HGB BLD-MCNC: 15.9 G/DL (ref 12–17)
IMM GRANULOCYTES # BLD AUTO: 0.05 THOUSAND/UL (ref 0–0.2)
IMM GRANULOCYTES NFR BLD AUTO: 1 % (ref 0–2)
LYMPHOCYTES # BLD AUTO: 1.61 THOUSANDS/ÂΜL (ref 0.6–4.47)
LYMPHOCYTES NFR BLD AUTO: 24 % (ref 14–44)
MCH RBC QN AUTO: 31.7 PG (ref 26.8–34.3)
MCHC RBC AUTO-ENTMCNC: 32.4 G/DL (ref 31.4–37.4)
MCV RBC AUTO: 98 FL (ref 82–98)
MONOCYTES # BLD AUTO: 0.58 THOUSAND/ÂΜL (ref 0.17–1.22)
MONOCYTES NFR BLD AUTO: 9 % (ref 4–12)
NEUTROPHILS # BLD AUTO: 4.3 THOUSANDS/ÂΜL (ref 1.85–7.62)
NEUTS SEG NFR BLD AUTO: 63 % (ref 43–75)
NRBC BLD AUTO-RTO: 0 /100 WBCS
PLATELET # BLD AUTO: 200 THOUSANDS/UL (ref 149–390)
PMV BLD AUTO: 10.2 FL (ref 8.9–12.7)
POTASSIUM SERPL-SCNC: 4.4 MMOL/L (ref 3.5–5.3)
PROT SERPL-MCNC: 7.1 G/DL (ref 6.4–8.4)
RBC # BLD AUTO: 5.01 MILLION/UL (ref 3.88–5.62)
SODIUM SERPL-SCNC: 141 MMOL/L (ref 135–147)
TSH SERPL DL<=0.05 MIU/L-ACNC: 1.09 UIU/ML (ref 0.45–4.5)
URATE SERPL-MCNC: 4.8 MG/DL (ref 3.5–8.5)
WBC # BLD AUTO: 6.71 THOUSAND/UL (ref 4.31–10.16)

## 2025-04-03 PROCEDURE — 85025 COMPLETE CBC W/AUTO DIFF WBC: CPT

## 2025-04-03 PROCEDURE — 84550 ASSAY OF BLOOD/URIC ACID: CPT

## 2025-04-03 PROCEDURE — 80053 COMPREHEN METABOLIC PANEL: CPT

## 2025-04-03 PROCEDURE — 84443 ASSAY THYROID STIM HORMONE: CPT

## 2025-04-03 PROCEDURE — 36415 COLL VENOUS BLD VENIPUNCTURE: CPT

## 2025-04-03 PROCEDURE — 83036 HEMOGLOBIN GLYCOSYLATED A1C: CPT

## 2025-04-09 ENCOUNTER — OFFICE VISIT (OUTPATIENT)
Dept: FAMILY MEDICINE CLINIC | Facility: CLINIC | Age: 70
End: 2025-04-09
Payer: COMMERCIAL

## 2025-04-09 VITALS
SYSTOLIC BLOOD PRESSURE: 110 MMHG | BODY MASS INDEX: 35.98 KG/M2 | DIASTOLIC BLOOD PRESSURE: 68 MMHG | HEIGHT: 71 IN | OXYGEN SATURATION: 93 % | TEMPERATURE: 98.4 F | WEIGHT: 257 LBS | HEART RATE: 56 BPM

## 2025-04-09 DIAGNOSIS — E78.5 HYPERLIPIDEMIA, UNSPECIFIED HYPERLIPIDEMIA TYPE: ICD-10-CM

## 2025-04-09 DIAGNOSIS — J96.11 CHRONIC RESPIRATORY FAILURE WITH HYPOXIA AND HYPERCAPNIA (HCC): ICD-10-CM

## 2025-04-09 DIAGNOSIS — J96.12 CHRONIC RESPIRATORY FAILURE WITH HYPOXIA AND HYPERCAPNIA (HCC): ICD-10-CM

## 2025-04-09 DIAGNOSIS — G47.33 OSA (OBSTRUCTIVE SLEEP APNEA): ICD-10-CM

## 2025-04-09 DIAGNOSIS — K63.5 POLYP OF COLON, UNSPECIFIED PART OF COLON, UNSPECIFIED TYPE: ICD-10-CM

## 2025-04-09 DIAGNOSIS — N18.30 STAGE 3 CHRONIC KIDNEY DISEASE, UNSPECIFIED WHETHER STAGE 3A OR 3B CKD (HCC): ICD-10-CM

## 2025-04-09 DIAGNOSIS — C61 PROSTATE CANCER (HCC): ICD-10-CM

## 2025-04-09 DIAGNOSIS — J44.9 CHRONIC OBSTRUCTIVE PULMONARY DISEASE, UNSPECIFIED COPD TYPE (HCC): ICD-10-CM

## 2025-04-09 DIAGNOSIS — E11.42 TYPE 2 DIABETES MELLITUS WITH DIABETIC POLYNEUROPATHY, UNSPECIFIED WHETHER LONG TERM INSULIN USE (HCC): ICD-10-CM

## 2025-04-09 DIAGNOSIS — Z00.00 MEDICARE ANNUAL WELLNESS VISIT, SUBSEQUENT: Primary | ICD-10-CM

## 2025-04-09 DIAGNOSIS — I10 ESSENTIAL HYPERTENSION: ICD-10-CM

## 2025-04-09 PROCEDURE — G2211 COMPLEX E/M VISIT ADD ON: HCPCS | Performed by: FAMILY MEDICINE

## 2025-04-09 PROCEDURE — 99214 OFFICE O/P EST MOD 30 MIN: CPT | Performed by: FAMILY MEDICINE

## 2025-04-09 PROCEDURE — G0439 PPPS, SUBSEQ VISIT: HCPCS | Performed by: FAMILY MEDICINE

## 2025-04-09 NOTE — PATIENT INSTRUCTIONS
Medicare Preventive Visit Patient Instructions  Thank you for completing your Welcome to Medicare Visit or Medicare Annual Wellness Visit today. Your next wellness visit will be due in one year (4/10/2026).  The screening/preventive services that you may require over the next 5-10 years are detailed below. Some tests may not apply to you based off risk factors and/or age. Screening tests ordered at today's visit but not completed yet may show as past due. Also, please note that scanned in results may not display below.  Preventive Screenings:  Service Recommendations Previous Testing/Comments   Colorectal Cancer Screening  Colonoscopy    Fecal Occult Blood Test (FOBT)/Fecal Immunochemical Test (FIT)  Fecal DNA/Cologuard Test  Flexible Sigmoidoscopy Age: 45-75 years old   Colonoscopy: every 10 years (May be performed more frequently if at higher risk)  OR  FOBT/FIT: every 1 year  OR  Cologuard: every 3 years  OR  Sigmoidoscopy: every 5 years  Screening may be recommended earlier than age 45 if at higher risk for colorectal cancer. Also, an individualized decision between you and your healthcare provider will decide whether screening between the ages of 76-85 would be appropriate. Colonoscopy: 06/28/2019  FOBT/FIT: Not on file  Cologuard: Not on file  Sigmoidoscopy: Not on file    Screening Current     Prostate Cancer Screening Individualized decision between patient and health care provider in men between ages of 55-69   Medicare will cover every 12 months beginning on the day after your 50th birthday PSA: 0.179 ng/mL     History Prostate Cancer     Hepatitis C Screening Once for adults born between 1945 and 1965  More frequently in patients at high risk for Hepatitis C Hep C Antibody: Not on file        Diabetes Screening 1-2 times per year if you're at risk for diabetes or have pre-diabetes Fasting glucose: 169 mg/dL (4/3/2025)  A1C: 7.4 % (4/3/2025)  Screening Not Indicated  History Diabetes   Cholesterol  Screening Once every 5 years if you don't have a lipid disorder. May order more often based on risk factors. Lipid panel: 11/12/2024  Screening Not Indicated  History Lipid Disorder      Other Preventive Screenings Covered by Medicare:  Abdominal Aortic Aneurysm (AAA) Screening: covered once if your at risk. You're considered to be at risk if you have a family history of AAA or a male between the age of 65-75 who smoking at least 100 cigarettes in your lifetime.  Lung Cancer Screening: covers low dose CT scan once per year if you meet all of the following conditions: (1) Age 55-77; (2) No signs or symptoms of lung cancer; (3) Current smoker or have quit smoking within the last 15 years; (4) You have a tobacco smoking history of at least 20 pack years (packs per day x number of years you smoked); (5) You get a written order from a healthcare provider.  Glaucoma Screening: covered annually if you're considered high risk: (1) You have diabetes OR (2) Family history of glaucoma OR (3)  aged 50 and older OR (4)  American aged 65 and older  Osteoporosis Screening: covered every 2 years if you meet one of the following conditions: (1) Have a vertebral abnormality; (2) On glucocorticoid therapy for more than 3 months; (3) Have primary hyperparathyroidism; (4) On osteoporosis medications and need to assess response to drug therapy.  HIV Screening: covered annually if you're between the age of 15-65. Also covered annually if you are younger than 15 and older than 65 with risk factors for HIV infection. For pregnant patients, it is covered up to 3 times per pregnancy.    Immunizations:  Immunization Recommendations   Influenza Vaccine Annual influenza vaccination during flu season is recommended for all persons aged >= 6 months who do not have contraindications   Pneumococcal Vaccine   * Pneumococcal conjugate vaccine = PCV13 (Prevnar 13), PCV15 (Vaxneuvance), PCV20 (Prevnar 20)  * Pneumococcal  polysaccharide vaccine = PPSV23 (Pneumovax) Adults 19-63 yo with certain risk factors or if 65+ yo  If never received any pneumonia vaccine: recommend Prevnar 20 (PCV20)  Give PCV20 if previously received 1 dose of PCV13 or PPSV23   Hepatitis B Vaccine 3 dose series if at intermediate or high risk (ex: diabetes, end stage renal disease, liver disease)   Respiratory syncytial virus (RSV) Vaccine - COVERED BY MEDICARE PART D  * RSVPreF3 (Arexvy) CDC recommends that adults 60 years of age and older may receive a single dose of RSV vaccine using shared clinical decision-making (SCDM)   Tetanus (Td) Vaccine - COST NOT COVERED BY MEDICARE PART B Following completion of primary series, a booster dose should be given every 10 years to maintain immunity against tetanus. Td may also be given as tetanus wound prophylaxis.   Tdap Vaccine - COST NOT COVERED BY MEDICARE PART B Recommended at least once for all adults. For pregnant patients, recommended with each pregnancy.   Shingles Vaccine (Shingrix) - COST NOT COVERED BY MEDICARE PART B  2 shot series recommended in those 19 years and older who have or will have weakened immune systems or those 50 years and older     Health Maintenance Due:      Topic Date Due   • Lung Cancer Screening  01/03/2025   • Colorectal Cancer Screening  06/28/2029   • Hepatitis C Screening  Discontinued     Immunizations Due:  There are no preventive care reminders to display for this patient.  Advance Directives   What are advance directives?  Advance directives are legal documents that state your wishes and plans for medical care. These plans are made ahead of time in case you lose your ability to make decisions for yourself. Advance directives can apply to any medical decision, such as the treatments you want, and if you want to donate organs.   What are the types of advance directives?  There are many types of advance directives, and each state has rules about how to use them. You may choose a  combination of any of the following:  Living will:  This is a written record of the treatment you want. You can also choose which treatments you do not want, which to limit, and which to stop at a certain time. This includes surgery, medicine, IV fluid, and tube feedings.   Durable power of  for healthcare (DPAHC):  This is a written record that states who you want to make healthcare choices for you when you are unable to make them for yourself. This person, called a proxy, is usually a family member or a friend. You may choose more than 1 proxy.  Do not resuscitate (DNR) order:  A DNR order is used in case your heart stops beating or you stop breathing. It is a request not to have certain forms of treatment, such as CPR. A DNR order may be included in other types of advance directives.  Medical directive:  This covers the care that you want if you are in a coma, near death, or unable to make decisions for yourself. You can list the treatments you want for each condition. Treatment may include pain medicine, surgery, blood transfusions, dialysis, IV or tube feedings, and a ventilator (breathing machine).  Values history:  This document has questions about your views, beliefs, and how you feel and think about life. This information can help others choose the care that you would choose.  Why are advance directives important?  An advance directive helps you control your care. Although spoken wishes may be used, it is better to have your wishes written down. Spoken wishes can be misunderstood, or not followed. Treatments may be given even if you do not want them. An advance directive may make it easier for your family to make difficult choices about your care.   Fall Prevention    Fall prevention  includes ways to make your home and other areas safer. It also includes ways you can move more carefully to prevent a fall. Health conditions that cause changes in your blood pressure, vision, or muscle strength and  coordination may increase your risk for falls. Medicines may also increase your risk for falls if they make you dizzy, weak, or sleepy.   Fall prevention tips:   Stand or sit up slowly.    Use assistive devices as directed.    Wear shoes that fit well and have soles that .    Wear a personal alarm.    Stay active.    Manage your medical conditions.    Home Safety Tips:  Add items to prevent falls in the bathroom.    Keep paths clear.    Install bright lights in your home.    Keep items you use often on shelves within reach.    Paint or place reflective tape on the edges of your stairs.    Weight Management   Why it is important to manage your weight:  Being overweight increases your risk of health conditions such as heart disease, high blood pressure, type 2 diabetes, and certain types of cancer. It can also increase your risk for osteoarthritis, sleep apnea, and other respiratory problems. Aim for a slow, steady weight loss. Even a small amount of weight loss can lower your risk of health problems.  How to lose weight safely:  A safe and healthy way to lose weight is to eat fewer calories and get regular exercise. You can lose up about 1 pound a week by decreasing the number of calories you eat by 500 calories each day.   Healthy meal plan for weight management:  A healthy meal plan includes a variety of foods, contains fewer calories, and helps you stay healthy. A healthy meal plan includes the following:  Eat whole-grain foods more often.  A healthy meal plan should contain fiber. Fiber is the part of grains, fruits, and vegetables that is not broken down by your body. Whole-grain foods are healthy and provide extra fiber in your diet. Some examples of whole-grain foods are whole-wheat breads and pastas, oatmeal, brown rice, and bulgur.  Eat a variety of vegetables every day.  Include dark, leafy greens such as spinach, kale, janna greens, and mustard greens. Eat yellow and orange vegetables such as  carrots, sweet potatoes, and winter squash.   Eat a variety of fruits every day.  Choose fresh or canned fruit (canned in its own juice or light syrup) instead of juice. Fruit juice has very little or no fiber.  Eat low-fat dairy foods.  Drink fat-free (skim) milk or 1% milk. Eat fat-free yogurt and low-fat cottage cheese. Try low-fat cheeses such as mozzarella and other reduced-fat cheeses.  Choose meat and other protein foods that are low in fat.  Choose beans or other legumes such as split peas or lentils. Choose fish, skinless poultry (chicken or turkey), or lean cuts of red meat (beef or pork). Before you cook meat or poultry, cut off any visible fat.   Use less fat and oil.  Try baking foods instead of frying them. Add less fat, such as margarine, sour cream, regular salad dressing and mayonnaise to foods. Eat fewer high-fat foods. Some examples of high-fat foods include french fries, doughnuts, ice cream, and cakes.  Eat fewer sweets.  Limit foods and drinks that are high in sugar. This includes candy, cookies, regular soda, and sweetened drinks.  Exercise:  Exercise at least 30 minutes per day on most days of the week. Some examples of exercise include walking, biking, dancing, and swimming. You can also fit in more physical activity by taking the stairs instead of the elevator or parking farther away from stores. Ask your healthcare provider about the best exercise plan for you.      © Copyright [employer] 2018 Information is for End User's use only and may not be sold, redistributed or otherwise used for commercial purposes. All illustrations and images included in CareNotes® are the copyrighted property of [employer] Inc[employer] or [employer]

## 2025-04-09 NOTE — ASSESSMENT & PLAN NOTE
Lab Results   Component Value Date    EGFR 58 04/03/2025    EGFR 65 12/29/2024    EGFR 58 (L) 11/12/2024    CREATININE 1.23 04/03/2025    CREATININE 1.20 12/29/2024    CREATININE 1.33 (H) 11/12/2024

## 2025-04-09 NOTE — PROGRESS NOTES
Name: Yevgeniy Beckford      : 1955      MRN: [id number]  Encounter Provider: Bari Esqueda MD  Encounter Date: 2025   Encounter department: [address]  :  Assessment & Plan  Medicare annual wellness visit, subsequent  Return visit in 4 months with fasting blood prior to visit       Essential hypertension  Continue Toprol XL 50 mg daily       Chronic obstructive pulmonary disease, unspecified COPD type (HCC)         Chronic respiratory failure with hypoxia and hypercapnia (HCC)  Continue nasal O2 at 2 L       NAS (obstructive sleep apnea)         Type 2 diabetes mellitus with diabetic polyneuropathy, unspecified whether long term insulin use ([facility])    Lab Results   Component Value Date    HGBA1C 7.4 (H) 2025     Continue Farxiga 5 mg daily and glipizide 5 mg daily.  Orders:    Hemoglobin A1C; Future    Albumin / creatinine urine ratio; Future    Prostate cancer (HCC)         Stage 3 chronic kidney disease, unspecified whether stage 3a or 3b CKD ([facility])  Lab Results   Component Value Date    EGFR 58 2025    EGFR 65 2024    EGFR 58 (L) 2024    CREATININE 1.23 2025    CREATININE 1.20 2024    CREATININE 1.33 (H) 2024            Hyperlipidemia, unspecified hyperlipidemia type  Continue Lipitor 40 mg and fenofibrate 134 mg daily  Orders:    Comprehensive metabolic panel; Future    CBC and differential; Future    TSH, 3rd generation with Free T4 reflex; Future    Polyp of colon, unspecified part of colon, unspecified type    Orders:    Ambulatory Referral to Colorectal Surgery; Future       Preventive health issues were discussed with patient, and age appropriate screening tests were ordered as noted in patient's After Visit Summary. Personalized health advice and appropriate referrals for health education or preventive services given if needed, as noted in patient's After Visit Summary.    History of Present Illness      Patient comes in for checkup.  Since he was here last he fell in his recliner and required hospitalization for physical therapy.       Patient Care Team:  Bari Esqueda MD as PCP - General  MD Stephany Webster MA as Care Coordinator (Oncology)  GENEVIEVE Francisco as  Care Manager (Oncology)    Review of Systems   Constitutional: Negative.    Respiratory:  Positive for shortness of breath.    Cardiovascular: Negative.    Gastrointestinal: Negative.      Medical History Reviewed by provider this encounter:  Tobacco  Allergies  Meds  Problems  Med Hx  Surg Hx  Fam Hx       Annual Wellness Visit Questionnaire   Yevgeniy is here for his Subsequent Wellness visit. Last Medicare Wellness visit information reviewed, patient interviewed and updates made to the record today.      Health Risk Assessment:   Patient rates overall health as poor. Patient feels that their physical health rating is slightly worse. Patient is satisfied with their life. Eyesight was rated as same. Hearing was rated as same. Patient feels that their emotional and mental health rating is same. Patients states they are sometimes angry. Patient states they are often unusually tired/fatigued. Pain experienced in the last 7 days has been a lot. Patient's pain rating has been 5/10. Patient states that he has experienced no weight loss or gain in last 6 months.     Fall Risk Screening:   In the past year, patient has experienced: history of falling in past year      Home Safety:  Patient does not have trouble with stairs inside or outside of their home. Patient has working smoke alarms and has working carbon monoxide detector. Home safety hazards include: none.     Nutrition:   Current diet is Diabetic, Low Carb and Limited junk food.     Medications:   Patient is currently taking over-the-counter supplements. OTC medications include: see medication list. Patient is able to manage medications.     Activities of Daily  Living (ADLs)/Instrumental Activities of Daily Living (IADLs):   Walk and transfer into and out of bed and chair?: Yes  Dress and groom yourself?: Yes    Bathe or shower yourself?: Yes    Feed yourself? Yes  Do your laundry/housekeeping?: Yes  Manage your money, pay your bills and track your expenses?: Yes  Make your own meals?: Yes    Do your own shopping?: Yes    Durable Medical Equipment Suppliers  [facility]    Previous Hospitalizations:   Any hospitalizations or ED visits within the last 12 months?: Yes    How many hospitalizations have you had in the last year?: 1-2    Advance Care Planning:   Living will: Yes    Durable POA for healthcare: Yes    Advanced directive: Yes    Advanced directive counseling given: Yes    Five wishes given: No    End of Life Decisions reviewed with patient: Yes    Provider agrees with end of life decisions: Yes      Preventive Screenings      Cardiovascular Screening:    General: Screening Not Indicated, History Lipid Disorder and Screening Current      Diabetes Screening:     General: Screening Not Indicated, History Diabetes and Screening Current      Colorectal Cancer Screening:     General: Screening Current      Prostate Cancer Screening:    General: History Prostate Cancer      Osteoporosis Screening:    General: Screening Not Indicated      Abdominal Aortic Aneurysm (AAA) Screening:    Risk factors include: age between 65-76 yo and tobacco use        General: Screening Current      Lung Cancer Screening:     General: Risks and Benefits Discussed    Due for: Low Dose CT (LDCT)      Hepatitis C Screening:    General: Screening Current    Screening, Brief Intervention, and Referral to Treatment (SBIRT)     Screening  Typical number of drinks in a day: 0  Typical number of drinks in a week: 0  Interpretation: Low risk drinking behavior.    AUDIT-C Screenin) How often did you have a drink containing alcohol in the past year? never  2) How many drinks did you have on a typical  "day when you were drinking in the past year? 0  3) How often did you have 6 or more drinks on one occasion in the past year? never    AUDIT-C Score: 0  Interpretation: Score 0-3 (male): Negative screen for alcohol misuse    Single Item Drug Screening:  How often have you used an illegal drug (including marijuana) or a prescription medication for non-medical reasons in the past year? never    Single Item Drug Screen Score: 0  Interpretation: Negative screen for possible drug use disorder    Social Drivers of Health     Financial Resource Strain: Medium Risk (2/22/2024)    Overall Financial Resource Strain (CARDIA)     Difficulty of Paying Living Expenses: Somewhat hard   Food Insecurity: No Food Insecurity (4/5/2025)    Hunger Vital Sign     Worried About Running Out of Food in the Last Year: Never true     Ran Out of Food in the Last Year: Never true   Transportation Needs: No Transportation Needs (4/5/2025)    PRAPARE - Transportation     Lack of Transportation (Medical): No     Lack of Transportation (Non-Medical): No   Housing Stability: Low Risk  (4/5/2025)    Housing Stability Vital Sign     Unable to Pay for Housing in the Last Year: No     Number of Times Moved in the Last Year: 0     Homeless in the Last Year: No   Utilities: Not At Risk (4/5/2025)    [facility] Utilities     Threatened with loss of utilities: No     No results found.    Objective   /68 (BP Location: Left arm, Patient Position: Sitting, Cuff Size: Standard)   Pulse 56   Temp 98.4 °F (36.9 °C) (Temporal)   Ht 5' 11\" (1.803 m)   Wt 117 kg (257 lb)   SpO2 93%   BMI 35.84 kg/m²     Physical Exam  Constitutional:       Appearance: Normal appearance. He is well-developed. He is obese.   HENT:      Head: Normocephalic and atraumatic.   Eyes:      Pupils: Pupils are equal, round, and reactive to light.   Cardiovascular:      Rate and Rhythm: Normal rate and regular rhythm.      Heart sounds: Normal heart sounds.   Pulmonary:      Effort: " Pulmonary effort is normal.      Breath sounds: Normal breath sounds.   Musculoskeletal:         General: Normal range of motion.      Cervical back: Neck supple.   Skin:     General: Skin is warm and dry.   Neurological:      Mental Status: He is alert.   Psychiatric:         Mood and Affect: Mood normal.         Behavior: Behavior normal.

## 2025-04-09 NOTE — ASSESSMENT & PLAN NOTE
Continue Lipitor 40 mg and fenofibrate 134 mg daily  Orders:    Comprehensive metabolic panel; Future    CBC and differential; Future    TSH, 3rd generation with Free T4 reflex; Future

## 2025-04-09 NOTE — ASSESSMENT & PLAN NOTE
Lab Results   Component Value Date    HGBA1C 7.4 (H) 04/03/2025     Continue Farxiga 5 mg daily and glipizide 5 mg daily.  Orders:    Hemoglobin A1C; Future    Albumin / creatinine urine ratio; Future

## 2025-05-13 DIAGNOSIS — E79.0 HYPERURICEMIA: ICD-10-CM

## 2025-05-14 RX ORDER — ALLOPURINOL 100 MG/1
100 TABLET ORAL DAILY
Qty: 90 TABLET | Refills: 1 | Status: SHIPPED | OUTPATIENT
Start: 2025-05-14

## 2025-06-09 DIAGNOSIS — M51.369 DEGENERATION OF INTERVERTEBRAL DISC OF LUMBAR REGION: ICD-10-CM

## 2025-06-09 RX ORDER — DIAZEPAM 5 MG/1
5 TABLET ORAL 2 TIMES DAILY
Qty: 180 TABLET | Refills: 0 | Status: SHIPPED | OUTPATIENT
Start: 2025-06-09 | End: 2025-12-06

## 2025-06-09 NOTE — TELEPHONE ENCOUNTER
Medication Refill Request       Medication: Diazepam 5 mg     Dose/Frequency: 5 mg BID     Quantity: 180    Pharmacy: [facility]    Office:   [x] PCP/Provider - Dheeraj  [] Specialty/Provider -     Does the patient have enough for 3 days?   [x] Yes   [] No - Send as HP to POD    Is the patient completely out of the medication or does not have enough until the next business day?  [x] Yes - send to Call Hub  [] No - Send as HP to POD

## 2025-06-20 DIAGNOSIS — I10 ESSENTIAL HYPERTENSION: ICD-10-CM

## 2025-06-21 RX ORDER — METOPROLOL SUCCINATE 50 MG/1
50 TABLET, EXTENDED RELEASE ORAL DAILY
Qty: 90 TABLET | Refills: 1 | Status: SHIPPED | OUTPATIENT
Start: 2025-06-21

## 2025-07-07 DIAGNOSIS — E78.2 MIXED HYPERLIPIDEMIA: ICD-10-CM

## 2025-07-09 RX ORDER — FENOFIBRATE 134 MG/1
134 CAPSULE ORAL DAILY
Qty: 90 CAPSULE | Refills: 1 | Status: SHIPPED | OUTPATIENT
Start: 2025-07-09

## 2025-07-11 ENCOUNTER — TELEPHONE (OUTPATIENT)
Age: 70
End: 2025-07-11

## 2025-07-11 DIAGNOSIS — E11.42 TYPE 2 DIABETES MELLITUS WITH DIABETIC POLYNEUROPATHY, UNSPECIFIED WHETHER LONG TERM INSULIN USE (HCC): ICD-10-CM

## 2025-07-11 NOTE — TELEPHONE ENCOUNTER
Anita from [facility] states a form was sent out everyday for the past week and asking if the provider can fill it out. Per Anita, if a patient is diabetic, a statin is usually recommended. If patient is already on a statin then the form needs to be filled out. If there are any questions please call [phone]

## 2025-07-11 NOTE — TELEPHONE ENCOUNTER
We have NOT received said fax. There is nothing pending in Dr. Esqueda' bin nor in [employer].     Will have to call [employer] to ensure they are faxing it to the correct fax number.

## 2025-07-11 NOTE — TELEPHONE ENCOUNTER
Spoke with Anita- pat fax number. Explained that we did NOT receive this fax and that we will be on the lookout for it.

## 2025-07-14 RX ORDER — DAPAGLIFLOZIN 5 MG/1
5 TABLET, FILM COATED ORAL EVERY MORNING
Qty: 90 TABLET | Refills: 1 | Status: SHIPPED | OUTPATIENT
Start: 2025-07-14

## 2025-07-28 DIAGNOSIS — E78.5 HYPERLIPIDEMIA, UNSPECIFIED HYPERLIPIDEMIA TYPE: ICD-10-CM

## 2025-07-29 RX ORDER — ATORVASTATIN CALCIUM 40 MG/1
40 TABLET, FILM COATED ORAL DAILY
Qty: 90 TABLET | Refills: 1 | Status: SHIPPED | OUTPATIENT
Start: 2025-07-29

## 2025-08-08 ENCOUNTER — APPOINTMENT (OUTPATIENT)
Age: 70
End: 2025-08-08
Payer: COMMERCIAL

## 2025-08-09 ENCOUNTER — APPOINTMENT (OUTPATIENT)
Age: 70
End: 2025-08-09
Payer: COMMERCIAL

## 2025-08-13 ENCOUNTER — OFFICE VISIT (OUTPATIENT)
Dept: FAMILY MEDICINE CLINIC | Facility: CLINIC | Age: 70
End: 2025-08-13
Payer: COMMERCIAL

## 2025-08-13 VITALS
HEART RATE: 86 BPM | WEIGHT: 260 LBS | DIASTOLIC BLOOD PRESSURE: 78 MMHG | SYSTOLIC BLOOD PRESSURE: 122 MMHG | TEMPERATURE: 97 F | HEIGHT: 71 IN | OXYGEN SATURATION: 92 % | BODY MASS INDEX: 36.4 KG/M2

## 2025-08-13 DIAGNOSIS — C61 PROSTATE CANCER (HCC): ICD-10-CM

## 2025-08-13 DIAGNOSIS — J44.9 CHRONIC OBSTRUCTIVE PULMONARY DISEASE, UNSPECIFIED COPD TYPE (HCC): Primary | ICD-10-CM

## 2025-08-13 DIAGNOSIS — E78.5 HYPERLIPIDEMIA, UNSPECIFIED HYPERLIPIDEMIA TYPE: ICD-10-CM

## 2025-08-13 DIAGNOSIS — D68.59 HYPERCOAGULABLE STATE (HCC): ICD-10-CM

## 2025-08-13 DIAGNOSIS — I26.99 OTHER ACUTE PULMONARY EMBOLISM WITHOUT ACUTE COR PULMONALE (HCC): ICD-10-CM

## 2025-08-13 DIAGNOSIS — R39.9 LOWER URINARY TRACT SYMPTOMS (LUTS): ICD-10-CM

## 2025-08-13 DIAGNOSIS — J96.12 CHRONIC RESPIRATORY FAILURE WITH HYPOXIA AND HYPERCAPNIA (HCC): ICD-10-CM

## 2025-08-13 DIAGNOSIS — I50.32 CHRONIC DIASTOLIC CHF (CONGESTIVE HEART FAILURE) (HCC): ICD-10-CM

## 2025-08-13 DIAGNOSIS — E11.42 TYPE 2 DIABETES MELLITUS WITH DIABETIC POLYNEUROPATHY, UNSPECIFIED WHETHER LONG TERM INSULIN USE (HCC): ICD-10-CM

## 2025-08-13 DIAGNOSIS — N18.30 STAGE 3 CHRONIC KIDNEY DISEASE, UNSPECIFIED WHETHER STAGE 3A OR 3B CKD (HCC): ICD-10-CM

## 2025-08-13 DIAGNOSIS — J96.11 CHRONIC RESPIRATORY FAILURE WITH HYPOXIA AND HYPERCAPNIA (HCC): ICD-10-CM

## 2025-08-13 PROCEDURE — 99214 OFFICE O/P EST MOD 30 MIN: CPT | Performed by: FAMILY MEDICINE

## 2025-08-13 PROCEDURE — G2211 COMPLEX E/M VISIT ADD ON: HCPCS | Performed by: FAMILY MEDICINE

## 2025-08-13 RX ORDER — DAPAGLIFLOZIN 10 MG/1
10 TABLET, FILM COATED ORAL DAILY
Qty: 90 TABLET | Refills: 3 | Status: SHIPPED | OUTPATIENT
Start: 2025-08-13 | End: 2026-08-08

## 2025-08-21 DIAGNOSIS — I82.409 DEEP VEIN THROMBOSIS (DVT) OF LOWER EXTREMITY, UNSPECIFIED CHRONICITY, UNSPECIFIED LATERALITY, UNSPECIFIED VEIN (HCC): ICD-10-CM

## 2025-08-21 RX ORDER — RIVAROXABAN 20 MG/1
20 TABLET, FILM COATED ORAL DAILY
Qty: 90 TABLET | Refills: 1 | Status: SHIPPED | OUTPATIENT
Start: 2025-08-21

## (undated) DEVICE — SYSTEM TRANSPERINEAL ACCESS PRECISIONPOINT

## (undated) DEVICE — MAX-CORE® DISPOSABLE CORE BIOPSY INSTRUMENT, 18G X 25CM: Brand: MAX-CORE